# Patient Record
Sex: MALE | Race: WHITE | URBAN - METROPOLITAN AREA
[De-identification: names, ages, dates, MRNs, and addresses within clinical notes are randomized per-mention and may not be internally consistent; named-entity substitution may affect disease eponyms.]

---

## 2022-03-24 ENCOUNTER — HOSPITAL ENCOUNTER (INPATIENT)
Age: 57
LOS: 9 days | Discharge: HOME HEALTH | DRG: 232 | End: 2022-04-02
Attending: EMERGENCY MEDICINE | Admitting: INTERNAL MEDICINE
Payer: COMMERCIAL

## 2022-03-24 ENCOUNTER — APPOINTMENT (OUTPATIENT)
Dept: GENERAL RADIOLOGY | Age: 57
DRG: 232 | End: 2022-03-24
Payer: COMMERCIAL

## 2022-03-24 ENCOUNTER — APPOINTMENT (OUTPATIENT)
Dept: VASCULAR LAB | Age: 57
DRG: 232 | End: 2022-03-24
Payer: COMMERCIAL

## 2022-03-24 ENCOUNTER — APPOINTMENT (OUTPATIENT)
Dept: CARDIAC CATH/INVASIVE PROCEDURES | Age: 57
DRG: 232 | End: 2022-03-24
Payer: COMMERCIAL

## 2022-03-24 DIAGNOSIS — I21.3 ST ELEVATION MYOCARDIAL INFARCTION (STEMI), UNSPECIFIED ARTERY (HCC): Primary | ICD-10-CM

## 2022-03-24 DIAGNOSIS — G89.18 ACUTE POST-OPERATIVE PAIN: ICD-10-CM

## 2022-03-24 PROBLEM — I25.10 CAD IN NATIVE ARTERY: Status: ACTIVE | Noted: 2022-03-24

## 2022-03-24 LAB
A/G RATIO: 1.5 (ref 1.1–2.2)
ABO/RH: NORMAL
ALBUMIN SERPL-MCNC: 4.1 G/DL (ref 3.4–5)
ALP BLD-CCNC: 121 U/L (ref 40–129)
ALT SERPL-CCNC: 19 U/L (ref 10–40)
ANION GAP SERPL CALCULATED.3IONS-SCNC: 10 MMOL/L (ref 3–16)
ANTIBODY SCREEN: NORMAL
AST SERPL-CCNC: 20 U/L (ref 15–37)
BASOPHILS ABSOLUTE: 0.1 K/UL (ref 0–0.2)
BASOPHILS RELATIVE PERCENT: 0.5 %
BILIRUB SERPL-MCNC: 0.7 MG/DL (ref 0–1)
BILIRUBIN URINE: NEGATIVE
BLOOD, URINE: ABNORMAL
BUN BLDV-MCNC: 16 MG/DL (ref 7–20)
CALCIUM SERPL-MCNC: 8.4 MG/DL (ref 8.3–10.6)
CHLORIDE BLD-SCNC: 100 MMOL/L (ref 99–110)
CLARITY: CLEAR
CO2: 22 MMOL/L (ref 21–32)
COLOR: YELLOW
CREAT SERPL-MCNC: 0.8 MG/DL (ref 0.9–1.3)
EKG ATRIAL RATE: 72 BPM
EKG DIAGNOSIS: NORMAL
EKG P AXIS: 21 DEGREES
EKG P-R INTERVAL: 116 MS
EKG Q-T INTERVAL: 374 MS
EKG QRS DURATION: 90 MS
EKG QTC CALCULATION (BAZETT): 409 MS
EKG R AXIS: 75 DEGREES
EKG T AXIS: 92 DEGREES
EKG VENTRICULAR RATE: 72 BPM
EOSINOPHILS ABSOLUTE: 0.1 K/UL (ref 0–0.6)
EOSINOPHILS RELATIVE PERCENT: 1.2 %
GFR AFRICAN AMERICAN: >60
GFR NON-AFRICAN AMERICAN: >60
GLUCOSE BLD-MCNC: 125 MG/DL (ref 70–99)
GLUCOSE URINE: NEGATIVE MG/DL
HCT VFR BLD CALC: 36.3 % (ref 40.5–52.5)
HCT VFR BLD CALC: 38.8 % (ref 40.5–52.5)
HEMOGLOBIN: 12.2 G/DL (ref 13.5–17.5)
HEMOGLOBIN: 13 G/DL (ref 13.5–17.5)
KETONES, URINE: NEGATIVE MG/DL
LEUKOCYTE ESTERASE, URINE: NEGATIVE
LV EF: 35 %
LV EF: 43 %
LVEF MODALITY: NORMAL
LVEF MODALITY: NORMAL
LYMPHOCYTES ABSOLUTE: 1.4 K/UL (ref 1–5.1)
LYMPHOCYTES RELATIVE PERCENT: 11.7 %
MCH RBC QN AUTO: 31.4 PG (ref 26–34)
MCH RBC QN AUTO: 31.6 PG (ref 26–34)
MCHC RBC AUTO-ENTMCNC: 33.6 G/DL (ref 31–36)
MCHC RBC AUTO-ENTMCNC: 33.7 G/DL (ref 31–36)
MCV RBC AUTO: 93.5 FL (ref 80–100)
MCV RBC AUTO: 93.8 FL (ref 80–100)
MICROSCOPIC EXAMINATION: YES
MONOCYTES ABSOLUTE: 0.8 K/UL (ref 0–1.3)
MONOCYTES RELATIVE PERCENT: 6.9 %
NEUTROPHILS ABSOLUTE: 9.6 K/UL (ref 1.7–7.7)
NEUTROPHILS RELATIVE PERCENT: 79.7 %
NITRITE, URINE: NEGATIVE
PDW BLD-RTO: 12.8 % (ref 12.4–15.4)
PDW BLD-RTO: 13.2 % (ref 12.4–15.4)
PH UA: 7 (ref 5–8)
PLATELET # BLD: 213 K/UL (ref 135–450)
PLATELET # BLD: 236 K/UL (ref 135–450)
PMV BLD AUTO: 8.3 FL (ref 5–10.5)
PMV BLD AUTO: 8.9 FL (ref 5–10.5)
POC ACT LR: 278 SEC
POC ACT LR: 285 SEC
POC ACT LR: 320 SEC
POTASSIUM SERPL-SCNC: 3.5 MMOL/L (ref 3.5–5.1)
PROTEIN UA: NEGATIVE MG/DL
RBC # BLD: 3.87 M/UL (ref 4.2–5.9)
RBC # BLD: 4.15 M/UL (ref 4.2–5.9)
RBC UA: NORMAL /HPF (ref 0–4)
SODIUM BLD-SCNC: 132 MMOL/L (ref 136–145)
SPECIFIC GRAVITY UA: 1.01 (ref 1–1.03)
SPECIMEN STATUS: NORMAL
TOTAL PROTEIN: 6.8 G/DL (ref 6.4–8.2)
TROPONIN: 6.01 NG/ML
TROPONIN: <0.01 NG/ML
URINE REFLEX TO CULTURE: ABNORMAL
URINE TYPE: ABNORMAL
UROBILINOGEN, URINE: 1 E.U./DL
WBC # BLD: 11 K/UL (ref 4–11)
WBC # BLD: 12.1 K/UL (ref 4–11)
WBC UA: NORMAL /HPF (ref 0–5)

## 2022-03-24 PROCEDURE — B2151ZZ FLUOROSCOPY OF LEFT HEART USING LOW OSMOLAR CONTRAST: ICD-10-PCS | Performed by: INTERNAL MEDICINE

## 2022-03-24 PROCEDURE — 92973 PRQ TRLUML C MCHN ASP THRMBC: CPT | Performed by: INTERNAL MEDICINE

## 2022-03-24 PROCEDURE — 93010 ELECTROCARDIOGRAM REPORT: CPT | Performed by: INTERNAL MEDICINE

## 2022-03-24 PROCEDURE — C1887 CATHETER, GUIDING: HCPCS

## 2022-03-24 PROCEDURE — B2111ZZ FLUOROSCOPY OF MULTIPLE CORONARY ARTERIES USING LOW OSMOLAR CONTRAST: ICD-10-PCS | Performed by: INTERNAL MEDICINE

## 2022-03-24 PROCEDURE — 86850 RBC ANTIBODY SCREEN: CPT

## 2022-03-24 PROCEDURE — 93458 L HRT ARTERY/VENTRICLE ANGIO: CPT | Performed by: INTERNAL MEDICINE

## 2022-03-24 PROCEDURE — 99152 MOD SED SAME PHYS/QHP 5/>YRS: CPT | Performed by: INTERNAL MEDICINE

## 2022-03-24 PROCEDURE — 6360000002 HC RX W HCPCS

## 2022-03-24 PROCEDURE — 2500000003 HC RX 250 WO HCPCS: Performed by: NURSE PRACTITIONER

## 2022-03-24 PROCEDURE — 2580000003 HC RX 258

## 2022-03-24 PROCEDURE — 96374 THER/PROPH/DIAG INJ IV PUSH: CPT

## 2022-03-24 PROCEDURE — 84484 ASSAY OF TROPONIN QUANT: CPT

## 2022-03-24 PROCEDURE — C1725 CATH, TRANSLUMIN NON-LASER: HCPCS

## 2022-03-24 PROCEDURE — 93005 ELECTROCARDIOGRAM TRACING: CPT | Performed by: EMERGENCY MEDICINE

## 2022-03-24 PROCEDURE — 2709999900 HC NON-CHARGEABLE SUPPLY

## 2022-03-24 PROCEDURE — 93308 TTE F-UP OR LMTD: CPT

## 2022-03-24 PROCEDURE — C8929 TTE W OR WO FOL WCON,DOPPLER: HCPCS

## 2022-03-24 PROCEDURE — 85025 COMPLETE CBC W/AUTO DIFF WBC: CPT

## 2022-03-24 PROCEDURE — 93458 L HRT ARTERY/VENTRICLE ANGIO: CPT

## 2022-03-24 PROCEDURE — 93971 EXTREMITY STUDY: CPT

## 2022-03-24 PROCEDURE — 2580000003 HC RX 258: Performed by: NURSE PRACTITIONER

## 2022-03-24 PROCEDURE — 86901 BLOOD TYPING SEROLOGIC RH(D): CPT

## 2022-03-24 PROCEDURE — 6360000002 HC RX W HCPCS: Performed by: INTERNAL MEDICINE

## 2022-03-24 PROCEDURE — 6370000000 HC RX 637 (ALT 250 FOR IP): Performed by: INTERNAL MEDICINE

## 2022-03-24 PROCEDURE — C9606 PERC D-E COR REVASC W AMI S: HCPCS

## 2022-03-24 PROCEDURE — 93005 ELECTROCARDIOGRAM TRACING: CPT | Performed by: INTERNAL MEDICINE

## 2022-03-24 PROCEDURE — 36415 COLL VENOUS BLD VENIPUNCTURE: CPT

## 2022-03-24 PROCEDURE — 93880 EXTRACRANIAL BILAT STUDY: CPT

## 2022-03-24 PROCEDURE — 86900 BLOOD TYPING SEROLOGIC ABO: CPT

## 2022-03-24 PROCEDURE — 92973 PRQ TRLUML C MCHN ASP THRMBC: CPT

## 2022-03-24 PROCEDURE — P9047 ALBUMIN (HUMAN), 25%, 50ML: HCPCS

## 2022-03-24 PROCEDURE — 02C03ZZ EXTIRPATION OF MATTER FROM CORONARY ARTERY, ONE ARTERY, PERCUTANEOUS APPROACH: ICD-10-PCS | Performed by: INTERNAL MEDICINE

## 2022-03-24 PROCEDURE — 2000000000 HC ICU R&B

## 2022-03-24 PROCEDURE — 85027 COMPLETE CBC AUTOMATED: CPT

## 2022-03-24 PROCEDURE — C1753 CATH, INTRAVAS ULTRASOUND: HCPCS

## 2022-03-24 PROCEDURE — 027034Z DILATION OF CORONARY ARTERY, ONE ARTERY WITH DRUG-ELUTING INTRALUMINAL DEVICE, PERCUTANEOUS APPROACH: ICD-10-PCS | Performed by: INTERNAL MEDICINE

## 2022-03-24 PROCEDURE — 99283 EMERGENCY DEPT VISIT LOW MDM: CPT

## 2022-03-24 PROCEDURE — 81001 URINALYSIS AUTO W/SCOPE: CPT

## 2022-03-24 PROCEDURE — 2580000003 HC RX 258: Performed by: INTERNAL MEDICINE

## 2022-03-24 PROCEDURE — 6370000000 HC RX 637 (ALT 250 FOR IP): Performed by: EMERGENCY MEDICINE

## 2022-03-24 PROCEDURE — C1769 GUIDE WIRE: HCPCS

## 2022-03-24 PROCEDURE — 92978 ENDOLUMINL IVUS OCT C 1ST: CPT

## 2022-03-24 PROCEDURE — 85347 COAGULATION TIME ACTIVATED: CPT

## 2022-03-24 PROCEDURE — 4A023N7 MEASUREMENT OF CARDIAC SAMPLING AND PRESSURE, LEFT HEART, PERCUTANEOUS APPROACH: ICD-10-PCS | Performed by: INTERNAL MEDICINE

## 2022-03-24 PROCEDURE — 99291 CRITICAL CARE FIRST HOUR: CPT | Performed by: INTERNAL MEDICINE

## 2022-03-24 PROCEDURE — C1874 STENT, COATED/COV W/DEL SYS: HCPCS

## 2022-03-24 PROCEDURE — B240ZZ3 ULTRASONOGRAPHY OF SINGLE CORONARY ARTERY, INTRAVASCULAR: ICD-10-PCS | Performed by: STUDENT IN AN ORGANIZED HEALTH CARE EDUCATION/TRAINING PROGRAM

## 2022-03-24 PROCEDURE — 80053 COMPREHEN METABOLIC PANEL: CPT

## 2022-03-24 PROCEDURE — 92941 PRQ TRLML REVSC TOT OCCL AMI: CPT | Performed by: INTERNAL MEDICINE

## 2022-03-24 PROCEDURE — 92978 ENDOLUMINL IVUS OCT C 1ST: CPT | Performed by: INTERNAL MEDICINE

## 2022-03-24 PROCEDURE — 71045 X-RAY EXAM CHEST 1 VIEW: CPT

## 2022-03-24 PROCEDURE — C1894 INTRO/SHEATH, NON-LASER: HCPCS

## 2022-03-24 PROCEDURE — 99223 1ST HOSP IP/OBS HIGH 75: CPT | Performed by: NURSE PRACTITIONER

## 2022-03-24 PROCEDURE — 94010 BREATHING CAPACITY TEST: CPT

## 2022-03-24 PROCEDURE — 6360000002 HC RX W HCPCS: Performed by: EMERGENCY MEDICINE

## 2022-03-24 PROCEDURE — 2500000003 HC RX 250 WO HCPCS

## 2022-03-24 PROCEDURE — 2720000010 HC SURG SUPPLY STERILE

## 2022-03-24 PROCEDURE — A4216 STERILE WATER/SALINE, 10 ML: HCPCS

## 2022-03-24 RX ORDER — ATORVASTATIN CALCIUM 80 MG/1
80 TABLET, FILM COATED ORAL NIGHTLY
Status: DISCONTINUED | OUTPATIENT
Start: 2022-03-24 | End: 2022-03-29

## 2022-03-24 RX ORDER — MIDAZOLAM HYDROCHLORIDE 5 MG/ML
INJECTION INTRAMUSCULAR; INTRAVENOUS
Status: COMPLETED | OUTPATIENT
Start: 2022-03-24 | End: 2022-03-24

## 2022-03-24 RX ORDER — FENTANYL CITRATE 50 UG/ML
INJECTION, SOLUTION INTRAMUSCULAR; INTRAVENOUS
Status: COMPLETED | OUTPATIENT
Start: 2022-03-24 | End: 2022-03-24

## 2022-03-24 RX ORDER — ACETAMINOPHEN 325 MG/1
650 TABLET ORAL EVERY 6 HOURS PRN
Status: DISCONTINUED | OUTPATIENT
Start: 2022-03-24 | End: 2022-04-02 | Stop reason: HOSPADM

## 2022-03-24 RX ORDER — HEPARIN SODIUM 5000 [USP'U]/ML
5000 INJECTION, SOLUTION INTRAVENOUS; SUBCUTANEOUS ONCE
Status: DISCONTINUED | OUTPATIENT
Start: 2022-03-24 | End: 2022-03-24

## 2022-03-24 RX ORDER — SODIUM CHLORIDE 0.9 % (FLUSH) 0.9 %
5-40 SYRINGE (ML) INJECTION EVERY 12 HOURS SCHEDULED
Status: DISCONTINUED | OUTPATIENT
Start: 2022-03-24 | End: 2022-03-24

## 2022-03-24 RX ORDER — SODIUM CHLORIDE 0.9 % (FLUSH) 0.9 %
5-40 SYRINGE (ML) INJECTION EVERY 12 HOURS SCHEDULED
Status: DISCONTINUED | OUTPATIENT
Start: 2022-03-24 | End: 2022-04-02 | Stop reason: HOSPADM

## 2022-03-24 RX ORDER — SODIUM CHLORIDE 0.9 % (FLUSH) 0.9 %
5-40 SYRINGE (ML) INJECTION PRN
Status: DISCONTINUED | OUTPATIENT
Start: 2022-03-24 | End: 2022-03-24

## 2022-03-24 RX ORDER — ASPIRIN 81 MG/1
81 TABLET, CHEWABLE ORAL DAILY
Status: DISCONTINUED | OUTPATIENT
Start: 2022-03-25 | End: 2022-03-24

## 2022-03-24 RX ORDER — M-VIT,TX,IRON,MINS/CALC/FOLIC 27MG-0.4MG
1 TABLET ORAL DAILY
Status: DISCONTINUED | OUTPATIENT
Start: 2022-03-25 | End: 2022-04-02 | Stop reason: HOSPADM

## 2022-03-24 RX ORDER — ATROPINE SULFATE 0.1 MG/ML
INJECTION INTRAVENOUS
Status: COMPLETED | OUTPATIENT
Start: 2022-03-24 | End: 2022-03-24

## 2022-03-24 RX ORDER — MORPHINE SULFATE 4 MG/ML
4 INJECTION, SOLUTION INTRAMUSCULAR; INTRAVENOUS EVERY 4 HOURS PRN
Status: DISCONTINUED | OUTPATIENT
Start: 2022-03-24 | End: 2022-03-29

## 2022-03-24 RX ORDER — HEPARIN SODIUM 1000 [USP'U]/ML
5000 INJECTION, SOLUTION INTRAVENOUS; SUBCUTANEOUS ONCE
Status: COMPLETED | OUTPATIENT
Start: 2022-03-24 | End: 2022-03-24

## 2022-03-24 RX ORDER — SODIUM CHLORIDE 9 MG/ML
25 INJECTION, SOLUTION INTRAVENOUS PRN
Status: DISCONTINUED | OUTPATIENT
Start: 2022-03-24 | End: 2022-03-24 | Stop reason: SDUPTHER

## 2022-03-24 RX ORDER — LISINOPRIL 5 MG/1
5 TABLET ORAL DAILY
Status: DISCONTINUED | OUTPATIENT
Start: 2022-03-24 | End: 2022-03-29

## 2022-03-24 RX ORDER — POLYETHYLENE GLYCOL 3350 17 G/17G
17 POWDER, FOR SOLUTION ORAL DAILY PRN
Status: DISCONTINUED | OUTPATIENT
Start: 2022-03-24 | End: 2022-04-02 | Stop reason: HOSPADM

## 2022-03-24 RX ORDER — SODIUM CHLORIDE 0.9 % (FLUSH) 0.9 %
5-40 SYRINGE (ML) INJECTION PRN
Status: DISCONTINUED | OUTPATIENT
Start: 2022-03-24 | End: 2022-04-02 | Stop reason: HOSPADM

## 2022-03-24 RX ORDER — MORPHINE SULFATE 2 MG/ML
2 INJECTION, SOLUTION INTRAMUSCULAR; INTRAVENOUS EVERY 4 HOURS PRN
Status: DISCONTINUED | OUTPATIENT
Start: 2022-03-24 | End: 2022-03-29

## 2022-03-24 RX ORDER — SODIUM CHLORIDE 9 MG/ML
25 INJECTION, SOLUTION INTRAVENOUS PRN
Status: DISCONTINUED | OUTPATIENT
Start: 2022-03-24 | End: 2022-03-29

## 2022-03-24 RX ORDER — ONDANSETRON 2 MG/ML
4 INJECTION INTRAMUSCULAR; INTRAVENOUS EVERY 6 HOURS PRN
Status: DISCONTINUED | OUTPATIENT
Start: 2022-03-24 | End: 2022-03-29

## 2022-03-24 RX ORDER — 0.9 % SODIUM CHLORIDE 0.9 %
500 INTRAVENOUS SOLUTION INTRAVENOUS ONCE
Status: COMPLETED | OUTPATIENT
Start: 2022-03-24 | End: 2022-03-24

## 2022-03-24 RX ORDER — ASPIRIN 81 MG/1
81 TABLET, CHEWABLE ORAL DAILY
Status: DISCONTINUED | OUTPATIENT
Start: 2022-03-25 | End: 2022-03-29

## 2022-03-24 RX ORDER — CARVEDILOL 3.12 MG/1
3.12 TABLET ORAL 2 TIMES DAILY WITH MEALS
Status: DISCONTINUED | OUTPATIENT
Start: 2022-03-24 | End: 2022-03-29

## 2022-03-24 RX ORDER — ACETAMINOPHEN 325 MG/1
650 TABLET ORAL EVERY 4 HOURS PRN
Status: DISCONTINUED | OUTPATIENT
Start: 2022-03-24 | End: 2022-03-24

## 2022-03-24 RX ORDER — EPTIFIBATIDE 0.75 MG/ML
2 INJECTION, SOLUTION INTRAVENOUS CONTINUOUS
Status: DISCONTINUED | OUTPATIENT
Start: 2022-03-24 | End: 2022-03-28

## 2022-03-24 RX ORDER — ATORVASTATIN CALCIUM 80 MG/1
80 TABLET, FILM COATED ORAL NIGHTLY
Status: DISCONTINUED | OUTPATIENT
Start: 2022-03-24 | End: 2022-03-24

## 2022-03-24 RX ORDER — ACETAMINOPHEN 650 MG/1
650 SUPPOSITORY RECTAL EVERY 6 HOURS PRN
Status: DISCONTINUED | OUTPATIENT
Start: 2022-03-24 | End: 2022-04-02 | Stop reason: HOSPADM

## 2022-03-24 RX ORDER — ONDANSETRON 4 MG/1
4 TABLET, ORALLY DISINTEGRATING ORAL EVERY 8 HOURS PRN
Status: DISCONTINUED | OUTPATIENT
Start: 2022-03-24 | End: 2022-03-29

## 2022-03-24 RX ORDER — METOPROLOL TARTRATE 5 MG/5ML
5 INJECTION INTRAVENOUS ONCE
Status: COMPLETED | OUTPATIENT
Start: 2022-03-24 | End: 2022-03-24

## 2022-03-24 RX ADMIN — ATORVASTATIN CALCIUM 80 MG: 80 TABLET, FILM COATED ORAL at 20:21

## 2022-03-24 RX ADMIN — METOPROLOL TARTRATE 5 MG: 1 INJECTION, SOLUTION INTRAVENOUS at 23:08

## 2022-03-24 RX ADMIN — MIDAZOLAM HYDROCHLORIDE 2 MG: 5 INJECTION INTRAMUSCULAR; INTRAVENOUS at 12:33

## 2022-03-24 RX ADMIN — FENTANYL CITRATE 50 MCG: 50 INJECTION, SOLUTION INTRAMUSCULAR; INTRAVENOUS at 12:33

## 2022-03-24 RX ADMIN — SODIUM CHLORIDE 500 ML: 9 INJECTION, SOLUTION INTRAVENOUS at 21:58

## 2022-03-24 RX ADMIN — ATROPINE SULFATE 1 MG: 0.1 INJECTION INTRAVENOUS at 12:44

## 2022-03-24 RX ADMIN — MIDAZOLAM HYDROCHLORIDE 2 MG: 5 INJECTION INTRAMUSCULAR; INTRAVENOUS at 12:44

## 2022-03-24 RX ADMIN — CARVEDILOL 3.12 MG: 3.12 TABLET, FILM COATED ORAL at 19:09

## 2022-03-24 RX ADMIN — LISINOPRIL 5 MG: 5 TABLET ORAL at 15:03

## 2022-03-24 RX ADMIN — FENTANYL CITRATE 50 MCG: 50 INJECTION, SOLUTION INTRAMUSCULAR; INTRAVENOUS at 12:25

## 2022-03-24 RX ADMIN — FENTANYL CITRATE 50 MCG: 50 INJECTION, SOLUTION INTRAMUSCULAR; INTRAVENOUS at 12:44

## 2022-03-24 RX ADMIN — EPTIFIBATIDE 2 MCG/KG/MIN: 0.75 INJECTION INTRAVENOUS at 12:48

## 2022-03-24 RX ADMIN — MIDAZOLAM HYDROCHLORIDE 2 MG: 5 INJECTION INTRAMUSCULAR; INTRAVENOUS at 12:26

## 2022-03-24 RX ADMIN — EPTIFIBATIDE 2 MCG/KG/MIN: 0.75 INJECTION INTRAVENOUS at 15:13

## 2022-03-24 RX ADMIN — SODIUM CHLORIDE, PRESERVATIVE FREE 10 ML: 5 INJECTION INTRAVENOUS at 20:22

## 2022-03-24 RX ADMIN — MUPIROCIN: 20 OINTMENT TOPICAL at 20:22

## 2022-03-24 RX ADMIN — EPTIFIBATIDE 2 MCG/KG/MIN: 0.75 INJECTION INTRAVENOUS at 22:35

## 2022-03-24 RX ADMIN — HEPARIN SODIUM 5000 UNITS: 1000 INJECTION INTRAVENOUS; SUBCUTANEOUS at 12:05

## 2022-03-24 RX ADMIN — TICAGRELOR 180 MG: 90 TABLET ORAL at 12:06

## 2022-03-24 RX ADMIN — FENTANYL CITRATE 50 MCG: 50 INJECTION, SOLUTION INTRAMUSCULAR; INTRAVENOUS at 12:38

## 2022-03-24 ASSESSMENT — ENCOUNTER SYMPTOMS
EYE REDNESS: 0
EYE DISCHARGE: 0
ABDOMINAL DISTENTION: 0
ABDOMINAL PAIN: 0
SHORTNESS OF BREATH: 0
SINUS PRESSURE: 0
EYE PAIN: 0
NAUSEA: 0
FACIAL SWELLING: 0
CHEST TIGHTNESS: 0
ANAL BLEEDING: 0
STRIDOR: 0
BLOOD IN STOOL: 0
TROUBLE SWALLOWING: 0
VOICE CHANGE: 0
VOMITING: 0
BACK PAIN: 0
EYE ITCHING: 0
DIARRHEA: 0
PHOTOPHOBIA: 0
WHEEZING: 0
CONSTIPATION: 0
RHINORRHEA: 0
SORE THROAT: 0
COUGH: 0

## 2022-03-24 ASSESSMENT — PAIN DESCRIPTION - PAIN TYPE: TYPE: ACUTE PAIN

## 2022-03-24 ASSESSMENT — PAIN - FUNCTIONAL ASSESSMENT: PAIN_FUNCTIONAL_ASSESSMENT: 0-10

## 2022-03-24 ASSESSMENT — PAIN DESCRIPTION - LOCATION: LOCATION: CHEST

## 2022-03-24 ASSESSMENT — PAIN SCALES - GENERAL
PAINLEVEL_OUTOF10: 0
PAINLEVEL_OUTOF10: 0

## 2022-03-24 NOTE — PROCEDURES
CARDIAC CATHETERIZATION REPORT     Procedure Date:  3/24/2022  Patient Name: Emiliano Pereira  MRN: [id number] : 1965      INDICATION     Acute inferior STEMI    PROCEDURES PERFORMED     Left heart catheterization  LVgram  Coronary angiogam  Coronary cath  Monitoring of moderate conscious sedation    Mechanical thrombectomy of RCA  IVUS of RCA  PCI of RCA with single drug-eluting stent      PROCEDURE DESCRIPTION     This was felt to be an emergency procedure. Patient was prepped draped in the usual sterile fashion. Local anaesthetic was applied over puncture site. Using a back wall technique, a 6 Lithuanian Terumo sheath was inserted into right radial artery. Verapamil, nitroglycerin, nicardipine were administered through the sheath. Heparin was administered. Diagnostic 5 Macedonian pigtail, Ultram catheters were used for diagnostic angiograms. At the conclusion of the procedure, a TR band was placed over the puncture site and hemostasis was obtained. There were no immediate complications. I supervised sedation from 12:17 PM to 1:08 PM with versed 6 mg/fentanyl 200 mcg during the procedure. An independent trained observer pushed meds at my direction. We monitored the patient's level of consciousness and vital signs/physiologic status throughout the procedure duration (see times listed previously). 130 cc contrast was utilized. <20cc EBL. FINDINGS     LVGRAM    LVEDP  17   GRADIENT ACROSS AORTIC VALVE  none   LV FUNCTION EF 35%   WALL MOTION  inferior hypokinesis   MITRAL REGURGITATION  mild       CORONARY ARTERIES    LM  Less than 10% proximal stenosis, mid 20-30% stenosis. There is distal 70% stenosis. LAD  Proximal 20% stenosis, mid 60 to 70% stenosis, distal less than 10% stenosis. D1 has 20% tirupekp-blc-zjxrwv stenosis. LCX  Less than 10% bvspifps-clq-dszfex stenosis.        RI  Difficult to fully visualize on study, is a small to medium size vessel, there does not appear to be severe obstruction, there does appear to be less than 10% bymybyvf-nue-tdgius stenosis. RCA Dominant, large vessel, proximal-mid 100% occlusion, distal vessel was ultimately visualized on completion angiography is less than 10% stenosis. There is thrombus present in the mid vessel. PERCUTANEOUS INTERVENTION DESCRIPTION     Heparin was used for any coagulation, patient been preloaded with Brilinta, Integrilin was given during the procedure. Initially a 6 Clive Everwise JR 5 guiding catheter used to intubate the RCA. A Nugg-it wire was used to cross the lesion. Mechanical thrombectomy was performed with the penumbra device. Flow was restored to this. IVUS was performed which showed thrombus/dissected plaque in the mid vessel and this was then treated with stenting with Medtronic resolute Ezel 4 x 38 mm drug-eluting stent. Stent was postdilated with 4.5 and 5 mm noncompliant balloons. Follow-up IVUS showed good stent apposition/expansion. There was no residual dissection or thrombus noted. CONCLUSIONS:     Severe left main disease  Successful PCI of RCA with single drug-eluting stent    Continue Integrilin indefinitely, will consult with CT surgery, case discussed with Dr. Ian Dillon in Cath Lab, plan for CABG at some point in the future. Fall

## 2022-03-24 NOTE — PLAN OF CARE
Plan of care initiated. Patient is alert and oriented. Denies any pain or SOB. Afebrile. Right radial site WDL. Right femoral site sheath removed WDL, patient tolerated well. NSR on monitor. To remain on Integrilin gtt until surgery on Monday. Free from any injury. Patient and patient's family updated on plan of care. Will continue plan of care.      Problem: Falls - Risk of:  Goal: Will remain free from falls  Description: Will remain free from falls  Outcome: Ongoing  Goal: Absence of physical injury  Description: Absence of physical injury  Outcome: Ongoing     Problem: Nutritional:  Goal: Ability to tolerate tube feedings without aspirating will improve  Description: Ability to tolerate tube feedings without aspirating will improve  Outcome: Ongoing  Goal: Consumption of food in small portions  Description: Consumption of food in small portions  Outcome: Ongoing  Goal: Consumption of liquid of appropriate consistency  Description: Consumption of liquid of appropriate consistency  Outcome: Ongoing     Problem: Respiratory:  Goal: Ability to maintain a clear airway will improve  Description: Ability to maintain a clear airway will improve  Outcome: Ongoing  Goal: Will remain free from infection  Description: Will remain free from infection  Outcome: Ongoing  Goal: Absence of aspiration  Description: Absence of aspiration  Outcome: Ongoing     Problem: Safety:  Goal: Ability to chew and swallow food without choking will improve  Description: Ability to chew and swallow food without choking will improve  Outcome: Ongoing  Goal: Ability to demonstrate good, daily oral hygiene techniques will improve  Description: Ability to demonstrate good, daily oral hygiene techniques will improve  Outcome: Ongoing  Goal: Maintenance of upright position during and after feeding  Description: Maintenance of upright position during and after feeding  Outcome: Ongoing     Problem: Discharge Planning:  Goal: Discharged to appropriate level of care  Description: Discharged to appropriate level of care  Outcome: Ongoing     Problem: Pain - Acute:  Description: Pain management should include both nonpharmacologic and pharmacologic interventions.   Goal: Pain level will decrease  Description: Pain level will decrease  Outcome: Ongoing     Problem: Fluid Volume - Imbalance:  Goal: Will show no signs and symptoms of excessive bleeding  Description: Will show no signs and symptoms of excessive bleeding  Outcome: Ongoing  Goal: Absence of imbalanced fluid volume signs and symptoms  Description: Absence of imbalanced fluid volume signs and symptoms  Outcome: Ongoing     Problem: Anxiety:  Goal: Level of anxiety will decrease  Description: Level of anxiety will decrease  Outcome: Ongoing     Problem: Cardiac Output - Decreased:  Goal: Cardiac output within specified parameters  Description: Cardiac output within specified parameters  Outcome: Ongoing     Problem: Tissue Perfusion - Cardiopulmonary, Altered:  Goal: Circulatory function within specified parameters  Description: Circulatory function within specified parameters  Outcome: Ongoing  Goal: Absence of angina  Description: Absence of angina  Outcome: Ongoing  Goal: Hemodynamic stability will improve  Description: Hemodynamic stability will improve  Outcome: Ongoing     Problem: Tissue Perfusion - Peripheral, Altered:  Goal: Absence of hematoma at arterial access site  Description: Absence of hematoma at arterial access site  Outcome: Ongoing  Goal: Circulatory function of lower extremities is within specified parameters  Description: Circulatory function of lower extremities is within specified parameters  Outcome: Ongoing     Problem: Tobacco Use:  Goal: Will participate in inpatient tobacco-use cessation counseling  Description: Will participate in inpatient tobacco-use cessation counseling  Outcome: Ongoing

## 2022-03-24 NOTE — PROGRESS NOTES
Patient was seen and examined chart was reviewed cath was reviewed and discussed with cardiology  Plan for coronary artery bypass x2 Monday  Currently patient received a loading dose of Brilinta stable on Angiomax for fresh stent in the right coronary  Plan  LIMA to LAD reverse saphenous vein to OM second case Monday  Echo  Carotid duplex  Vein mapping

## 2022-03-24 NOTE — PROGRESS NOTES
Patient arrived from cath lab to  ICU. Attached to bedside monitor. Oriented to room and call light. Right radial TR band in place with no complications at this time. Patient has venous sheath to right groin. Patient denies any chest pain of shortness of breath at this time. Right and left side rail up and bed in lowest position. Will continue to monitor patient.      Lux Pena, RN

## 2022-03-24 NOTE — PROGRESS NOTES
Venous sheath removed at 1836. Pressure held for 10 minutes No complications noted. Will continue to monitor.      Farhan Sood RN

## 2022-03-24 NOTE — ED NOTES
1200:  Pt arrives via [facility]. Pt alert and oriented at time of admission to ED  1200:  Cath lab RNs x2 at bedside prior to pt arrival.  1204:  Cardiologist to bedside to assess pt and to inform pt of plan of care. 1205:  Heparin administered per order. 1206:  Brilinta administered per physician order. 1208:  Pt's monitors transferred to Cath lab portable monitors. 1210:  Pt transferred to Cath lab via stretcher. Pt remains stable and alert at transfer. 1211:  Pt's wife to room and escorted to cardiology waiting room by [facility].        Danielle Vásquez, RN  03/24/22 1223

## 2022-03-24 NOTE — PROGRESS NOTES
4 Eyes Skin Assessment     The patient is being assess for   Cath Lab Post-Op    I agree that 2 RN's have performed a thorough Head to Toe Skin Assessment on the patient. ALL assessment sites listed below have been assessed. Areas assessed for pressure by both nurses:   [x]   Head, Face, and Ears   [x]   Shoulders, Back, and Chest, Abdomen  [x]   Arms, Elbows, and Hands   [x]   Coccyx, Sacrum, and Ischium  [x]   Legs, Feet, and Heels        Skin Assessed Under all Medical Devices by both nurses:  O2 device tubing and walker              All Mepilex Borders were peeled back and area peeked at by both nurses:  Yes  Please list where Mepilex Borders are located:  Sacrum             **SHARE this note so that the co-signing nurse is able to place an eSignature**    Co-signer eSignature: Electronically signed by Mat Jain RN on 3/24/22 at 6:41 PM EDT    Does the Patient have Skin Breakdown related to pressure?   No              Tripp Prevention initiated:  Yes   Wound Care Orders initiated:  NA      [facility] nurse consulted for Pressure Injury (Stage 3,4, Unstageable, DTI, NWPT, Complex wounds)and New or Established Ostomies:  NA      Primary Nurse eSignature: Electronically signed by Heidi Bryant RN on 3/24/22 at 7:28 PM EDT

## 2022-03-24 NOTE — H&P
Hospital Medicine History & Physical      PCP: No primary care provider on file. Date of Admission: 3/24/2022    Date of Service: Pt seen/examined on 3/24/2022 and Admitted to Inpatient with expected LOS greater than two midnights due to medical therapy. Chief Complaint:  Chest pain    History Of Present Illness:   56 y.o. male who presented to [facility] with chest pain. No significant PMHx. Works in [employer]. No known hx of CAD, DM2, HTN. He reports acute episode of chest pain/pressure, associated with some SOB. EMS called and he was found to have inferior STEMI. Presented to the ED. Cath lab activated. Found to have mid RCA thrombosis s/p PCI with DANIA; also noted to have 70% left main. CT Surgery consulted. Pain has improved. Past Medical History:    History reviewed. No pertinent past medical history. Past Surgical History:    History reviewed. No pertinent surgical history. Medications Prior to Admission:   Prior to Admission medications    Not on File       Allergies:   Patient has no known allergies. Social History:    TOBACCO:   reports that he has been smoking. He does not have any smokeless tobacco history on file. ETOH:   has no history on file for alcohol use. E-Cigarettes/Vaping Use     Questions Responses    E-Cigarette/Vaping Use     Start Date     Passive Exposure     Quit Date     Counseling Given     Comments             Family History:    Reviewed and negative in regards to presenting illness/complaint. REVIEW OF SYSTEMS:   Pertinent positives as noted in the HPI. All other systems reviewed with patient as able and negative. Physical Exam Performed:  /74   Pulse 88   Temp 97.6 °F (36.4 °C) (Oral)   Resp 8   Ht 5' 11\" (1.803 m)   Wt 197 lb 15.6 oz (89.8 kg)   SpO2 99%   BMI 27.61 kg/m²   General appearance:  No apparent distress, appears stated age and cooperative. HEENT:  Pupils equal, round, and reactive to light.  Conjunctivae/corneas clear.  Neck:  Supple, no jugular venous distention. Trachea midline with full range of motion. Respiratory:  Normal respiratory effort. Clear to auscultation, bilaterally without Rales/Wheezes/Rhonchi. Cardiovascular:  Regular rate and rhythm with normal S1/S2 without murmurs, rubs or gallops. Abdomen:  Soft, non-tender, non-distended with normal bowel sounds. Musculoskelatal:  No clubbing, cyanosis or edema bilaterally. Full range of motion without deformity. Neurologic:  Neurovascularly intact without any focal sensory/motor deficits. Cranial nerves: II-XII intact, grossly non-focal.  Psychiatric:  Alert and oriented, thought content appropriate, normal insight  Skin:  Skin color, texture, turgor normal.  No rashes or lesions. Capillary Refill:  Brisk,< 3 seconds   Peripheral Pulses:  +2 palpable, equal bilaterally     Labs:     Recent Labs     03/24/22  1210 03/24/22  1240   WBC 12.1* 11.0   HGB 13.0* 12.2*   HCT 38.8* 36.3*    213     Recent Labs     03/24/22  1240   *   K 3.5      CO2 22   BUN 16   CREATININE 0.8*   CALCIUM 8.4     Recent Labs     03/24/22  1240   AST 20   ALT 19   BILITOT 0.7   ALKPHOS 121     No results for input(s): INR in the last 72 hours. Recent Labs     03/24/22  1240   TROPONINI <0.01       Radiology:     CXR: I have reviewed the CXR with the following interpretation: Clear  EKG:  I have reviewed the EKG with the following interpretation: NSR, inferior STEMI    XR CHEST PORTABLE    Result Date: 3/24/2022  EXAMINATION: ONE XRAY VIEW OF THE CHEST 3/24/2022 12:16 pm COMPARISON: None. HISTORY: ORDERING SYSTEM PROVIDED HISTORY: chest pain TECHNOLOGIST PROVIDED HISTORY: Reason for exam:->chest pain Reason for Exam: STEMI FINDINGS: The cardiomediastinal silhouette is unremarkable. The lungs are clear. No infiltrate, pleural fluid or evidence of overt failure. No acute cardiopulmonary disease.        ASSESSMENT:    Principal Problem:    CAD in native artery  Active Problems:    STEMI (ST elevation myocardial infarction) ([facility] [address]  Resolved Problems:    * No resolved hospital problems. *      PLAN:    CAD/Inferior STEMI: s/p PCI of mid RCA. Also has 70% left main. CT Surgery consulted and planning for CABG on Monday. Pre-op eval underway. Continue medical management with Integrillin. Pain control as needed. DVT Prophylaxis: Lovenox  Diet: ADULT DIET;  Regular  Code Status: Full Code  PT/OT Eval Status: NA  Dispo - ICU     Lux Ferreira MD

## 2022-03-24 NOTE — CONSULTS
Department of Cardiovascular & Thoracic Surgery  History and Physical          DIAGNOSIS:  Severe multivessel CAD, inferior STEMI     CHIEF COMPLAINT:    Chief Complaint   Patient presents with    Chest Pain     Pain started at approximately 1000 this AM.  Pt has hx of smoking cigarettes daily. Pt reports no medical or surgical hx. Pt reports no allergies. 324 ASA administered by EMS prior to arrival.  IV in [facility] [place] placed by EMS prior to arrival.       History Obtained From:  patient, electronic medical record    HISTORY OF PRESENT ILLNESS:      The patient is a 56 y.o. male, current smoker, with no significant past medical history documented who presented to [facility] ED today with complaints of chest pain that started while he was outside [employer]. He states he initially developed shortness of breath shortly after edging. He then developed chest tightness. He denies nausea or dizziness. He did experience some diaphoresis. His mother has a history of CAD. EKG obtained by EMS showed STEMI. Heparin gtt initiated and brilinta given in ED. The patient was taken emergently for LHC which showed severe multivessel disease, including LM disease. We have been consulted for surgical revascularization. Past Medical History:    History reviewed. No pertinent past medical history. Past Surgical History:    History reviewed. No pertinent surgical history. Medications Prior to Admission:   No medications prior to admission. Allergies:  Patient has no known allergies. Social History:    TOBACCO:   reports that he has been smoking. He does not have any smokeless tobacco history on file. ETOH:   has no history on file for alcohol use. CAFFEINE ABUSE:  Drinks 3-4 cups of coffee/day   DRUGS:   has no history on file for drug use. LIFESTYLE: active    MARITAL STATUS:    OCCUPATION:  Owns/operates landscaping company      Family History:    History reviewed. No pertinent family history.     REVIEW OF SYSTEMS: Constitutional:  No night sweats, headaches, weight loss. Eyes:  No glaucoma, cataracts. ENMT:  No nosebleeds, deviated septum. Cardiac:  No arrhythmias. + chest pain. Vascular:  No claudication, varicosities. GI:  No PUD, heartburn. :  No kidney stones, frequent UTIs  Musculoskeletal:  No arthritis, gout. Respiratory:  + SOB. No emphysema, asthma. + tobacco use. Integumentary:  No dermatitis, itching, rash. Neurological:  No stroke, TIAs, seizures. Psychiatric:  No depression, anxiety. Endocrine: No diabetes, thyroid issues. Hematologic:  No bleeding, easy bruising. Immunologic:  No known cancer, steroid therapies. PHYSICAL EXAM:    VITALS:  /75   Pulse 65   Temp 98.3 °F (36.8 °C) (Oral)   Resp 17   Ht 5' 11\" (1.803 m)   Wt 197 lb 15.6 oz (89.8 kg)   SpO2 97%   BMI 27.61 kg/m²     Constitutional:   Well developed and nourished male. No acute distress. Overweight. Eyes:  lids and lashes normal, pupils equal, round and reactive to light, extra ocular muscles intact, sclera clear, conjunctiva normal    Head/ENT:   normal teeth, gums, & palate. Moist mucus membranes. No cyanosis or pallor. Neck:  supple, symmetrical, trachea midline, no lymphadenopathy, no jugular venous distension, no carotid bruits and MASSES:  no masses. Lungs:  no increased work of breathing, good air exchange, no retractions and clear to auscultation. No tactile fremitus. Cardiovascular:  regular rate and rhythm, S1, S2 normal, no murmur, click, rub or gallop. Apical impulse in 5th intercostal space. Pulses:  Right dorsalis pedis 2, Left dorsalis pedis 2, Right posterior tibial 2, Left Posterior tibial 2, Right radial 2, and Left radial 2. Abdomen:  normal bowel sounds, non-tender, unable to evaluate abdominal aorta due to body habitus. No hepatosplenomegaly or masses. Musculoskeletal:  Back is straight and non-tender, full ROM of upper and lower extremities.   No kyphosis or scoliosis. Extremities:  Warm, pink, no clubbing, cyanosis, petechiae, ischemia, or deformities. No peripheral edema. Skin: no rashes, no ecchymoses, no petechiae, no nodules, no jaundice, no purpura, no wounds    Neurological/Psychiatric: oriented, normal mood, grossly non-focal    DATA:  EKG:    3/24/22  Baseline artifact. Normal sinus rhythm. ST elevation consider inferior injury or acute infarct. ACUTE MI. Abnormal ECG. No previous ECGs available. Confirmed by Gary Vieira MD, [facility] ([id number]) on 3/24/2022 6:01:20 PM    3/24/22  Poor data quality, interpretation may be adversely affected. Normal sinus rhythm. Possible Inferior infarct , age undetermined. Abnormal ECG. When compared with ECG of 24-MAR-2022 12:03, ST no longer elevated in Inferior leads. ST no longer depressed in Anterolateral leads. T wave inversion now evident in Inferior leads.  T wave inversion no longer evident in Lateral leads    CBC:   Lab Results   Component Value Date    WBC 11.0 03/24/2022    RBC 3.87 03/24/2022    HGB 12.2 03/24/2022    HCT 36.3 03/24/2022    MCV 93.8 03/24/2022    MCH 31.6 03/24/2022    MCHC 33.7 03/24/2022    RDW 12.8 03/24/2022     03/24/2022    MPV 8.3 03/24/2022     CMP:    Lab Results   Component Value Date     03/24/2022    K 3.5 03/24/2022     03/24/2022    CO2 22 03/24/2022    BUN 16 03/24/2022    CREATININE 0.8 03/24/2022    GFRAA >60 03/24/2022    AGRATIO 1.5 03/24/2022    LABGLOM >60 03/24/2022    GLUCOSE 125 03/24/2022    PROT 6.8 03/24/2022    LABALBU 4.1 03/24/2022    CALCIUM 8.4 03/24/2022    BILITOT 0.7 03/24/2022    ALKPHOS 121 03/24/2022    AST 20 03/24/2022    ALT 19 03/24/2022     Last 3 Troponin:    Lab Results   Component Value Date    TROPONINI 6.01 03/24/2022    TROPONINI <0.01 03/24/2022     CXRAY:  3/24/22  FINDINGS:   The cardiomediastinal silhouette is unremarkable.  The lungs are clear.  No   infiltrate, pleural fluid or evidence of overt failure.           Impression No acute cardiopulmonary disease.         TTE: 3/24/22  Left Ventricle   Normal left ventricle size, wall thickness, and systolic function with an   estimated ejection fraction of 40-45%. EF by Vaz's method estimated at 53%. Normal left ventricle size, wall thickness, and systolic function with an   estimated ejection fraction of 40-45%. Mild hypokinesis of the inferior, inferoseptal wall segments. Normal left ventricular diastolic filling pressures. Definity® used for myocardial border enhancement. Mitral Valve   The mitral valve is normal in structure. Trace mitral regurgitation. Left Atrium   The left atrium is normal in size. Aortic Valve   The aortic valve is normal in structure and function. There is no evidence of aortic valve regurgitation. Aorta   The aortic root is normal in size. Right Ventricle   The right ventricle is normal in size and function. TAPSE is measured at 21 mm. S\" Prime Velocity is measured at 10.7 cm/s. Tricuspid Valve   The tricuspid valve is normal in structure   Trace tricuspid valve regurgitation. Systolic pulmonary artery pressure (sPAP) is normal and estimated at 23 mmHg   (right atrial pressure 3 mmHg)      Right Atrium   The right atrial size is normal.   Right atrial area is 14.9 cm2. Pulmonic Valve   The pulmonic valve is normal in structure and function. No evidence of pulmonic regurgitation. Pericardial Effusion   No pericardial effusion noted. Pleural Effusion   No pleural effusion. Miscellaneous   No obvious masses, thrombi or vegetations are noted. IVC is dilated (> 2.1 cm) and collapses > 50% with respiration consistent   with elevated right atrial pressure (8 mmHg).      M-Mode/2D Measurements (cm)      LV Diastolic Dimension: 2.43 cm LV Systolic Dimension: 9.93 cm   LV Septum Diastolic: 0.63 cm   LV PW Diastolic: 4.36 cm        AO Root Dimension: 3.1 cm                                   AV Cusp Separation: 1.4 cm                                   LA Dimension: 3.1 cm   LVOT: 1.8 cm                    LA Area: 21.6 cm2                                   LA volume/Index: 53 ml /26 ml/m2     Doppler Measurements      AV Peak Velocity: 110 cm/s     MV Peak E-Wave: 66.1 cm/s   AV Peak Gradient: 4.84 mmHg    MV Peak A-Wave: 64.2 cm/s   LVOT Peak Velocity: 68.2 cm/s  MV E/A Ratio: 1.03      TR Velocity:194 cm/s   TR Gradient:15.05 mmHg   Estimated RAP:8 mmHg   Estimated RVSP: 23 mmHg   E' Septal Velocity: 7.02 cm/s   E' Lateral Velocity: 11.2 cm/s   E/E' ratio: 7.7      Aortic Valve      Peak Velocity: 110 cm/s   Peak Gradient: 4.84 mmHg      Cusp Separation: 1.4 cm     Aorta      Aortic Root: 3.1 cm   Ascending Aorta: 3.1 cm   LVOT Diameter: 1.8 cm     CT Chest:  No prior study available in EMR for review. [facility]:  3/24/22  LVGRAM     LVEDP  17   GRADIENT ACROSS AORTIC VALVE  none   LV FUNCTION EF 35%   WALL MOTION  inferior hypokinesis   MITRAL REGURGITATION  mild         CORONARY ARTERIES     LM  Less than 10% proximal stenosis, mid 20-30% stenosis. There is distal 70% stenosis.       LAD  Proximal 20% stenosis, mid 60 to 70% stenosis, distal less than 10% stenosis.     D1 has 20% yuiuyucn-brr-rudvig stenosis.       LCX  Less than 10% feeucftt-few-ihnaye stenosis.       RI  Difficult to fully visualize on study, is a small to medium size vessel, there does not appear to be severe obstruction, there does appear to be less than 10% vylixwka-sru-flbvdz stenosis.       RCA Dominant, large vessel, proximal-mid 100% occlusion, distal vessel was ultimately visualized on completion angiography is less than 10% stenosis. There is thrombus present in the mid vessel.            PERCUTANEOUS INTERVENTION DESCRIPTION      Heparin was used for any coagulation, patient been preloaded with Brilinta, Integrilin was given during the procedure. Initially a 6 [employer] 5 guiding catheter used to intubate the RCA.   A Wizzgo wire was used to cross the lesion. Mechanical thrombectomy was performed with the penumbra device. Flow was restored to this. IVUS was performed which showed thrombus/dissected plaque in the mid vessel and this was then treated with stenting with Medtronic resolute Isidro 4 x 38 mm drug-eluting stent. Stent was postdilated with 4.5 and 5 mm noncompliant balloons. Follow-up IVUS showed good stent apposition/expansion. There was no residual dissection or thrombus noted.        [id number] Score for Atrial Fibrillation Stroke Risk   Risk   Factors  Component Value   C CHF No 0   H HTN No 0   A2 Age >= 76 No,  (56 y.o.) 0   D DM No 0   S2 Prior Stroke/TIA No 0   V Vascular Disease Yes 1   A Age 74-69 No,  (56 y.o.) 0   Sc Sex male 0    [id number]  Score  1   Score last updated 3/24/22 4:02 PM EDT    Click here for a link to the UpToDate guideline \"Atrial Fibrillation: Anticoagulation therapy to prevent embolization    Disclaimer: Risk Score calculation is dependent on accuracy of patient problem list and past encounter diagnosis. ASSESSMENT AND PLAN:  STS Cardiac Surgery Risk profile: CABG (pending carotid duplex, pfts)     Mortality:  1.67%  Renal Failure:  0.78%  Permanent Stroke:  0.83%  Prolonged Ventilation:  8.86%  Deep Sternal Infection:  0.17%  Reoperation:  1.93%  Morbidity and Mortality:  12.01%  Short LOS:  56.06%  Long LOS:  3.79%    Mr. Ericka Caraballo is a 56year old, current smoker, admitted with inferior STEMI who was taken for emergent LHC and found to have severe multivessel CAD including 100% prox-mid RCA occlusion and 70% distal LM stenosis. Successful PCI of the RCA with single DANIA was performed. Currently on Integrilin gtt. Chest pain free at time of my exam. He did receive a loading dose of Brilinta today. His distal LM disease is not amenable to PCI. Recommend surgical revascularization.  Will plan for CABG x 2 on Monday (2nd case) to allow time for Brilinta metabolism as long as he remains stable in the interim. This plan was discussed with the patient along with his wife and son at the bedside. The patient is in agreement and wishes to proceed. Will continue with pre-operative testing and risk stratification. Carotid duplex. Vein mapping. PFTs. Echo. UA.      Rashi Galeas, JESSICA - CNP  3/24/2022  8:52 PM

## 2022-03-24 NOTE — CONSULTS
[facility]  [phone]      Attending Physician: Charissa Freeman MD  Reason for Consultation/Chief Complaint: Chest pain    Subjective   History of Present Illness:  Luis Mendez is a 56 y.o. patient who presented to the hospital with complaints of chest pain, patient unable to provide much history as he is in distress with chest pain, patient presented emergency room and was found to have acute inferior STEMI. He denies any prior cardiac history. He says his only medical history includes cavities. Past Medical History:  Dental caries        Social History:  Smoker    Family History:  Positive family history diabetes    Home Medications:  Were reviewed and are listed in nursing record and/or below  Prior to Admission medications    Not on File        CURRENT Medications:  heparin (porcine) injection 5,000 Units, Once  ticagrelor (BRILINTA) tablet 180 mg, BID        Allergies:  Patient has no known allergies. Review of Systems:   Unable to obtain, patient is in distress with chest pain.     Objective   PHYSICAL EXAM:    Heart rate 75  Blood pressure 123/80       General Appearance:  Alert, cooperative, in distress, appears stated age   Head:  Normocephalic, without obvious abnormality, atraumatic   Eyes:   conjunctiva/corneas clear   Throat: Lips, mucosa, and tongue dry   Neck: Supple, no JVP   Lungs:   Clear to auscultation bilaterally, respirations labored   Chest Wall:  No deformity or tenderness   Heart:  Regular rate and rhythm, S1, S2 normal, no murmur, rub or gallop   Abdomen:   Soft, non-tender   Extremities: Extremities normal, atraumatic, no cyanosis or edema   Pulses: 2+ and symmetric in upper extremities   Skin: Skin color, texture, turgor normal, no rashes or lesions   Pysch:  Anxious mood and affect   Neurologic: Normal gross motor and sensory exam.         Labs   CBC: No results found for: WBC, RBC, HGB, HCT, MCV, RDW, PLT  CMP:No results found for: NA, K, CL, CO2, BUN, CREATININE, GFRAA, AGRATIO, LABGLOM, GLUCOSE, PROT, CALCIUM, BILITOT, ALKPHOS, AST, ALT  PT/INR:  No results found for: PTINR  HgBA1c:No results found for: LABA1C  No results found for: CKTOTAL, CKMB, CKMBINDEX, TROPONINI      Cardiac Data     Last EKG: Normal sinus rhythm, inferior ST elevation    Echo:    Stress Test:    Cath:    Studies:       I have reviewed labs and imaging/xray/diagnostic testing in this note. Assessment and Plan          Acute inferior STEMI    Plan on emergency heart catheterization    [facility] [id number]      Thank you for allowing us to participate in the care of [place] [address]. Please call me with any questions [phone].     Remi Mcgee MD, [facility]   Interventional Cardiologist  [facility]  [phone] [facility]  [phone] [address] [place] [address]  3/24/2022 12:09 PM

## 2022-03-24 NOTE — ED NOTES
@2177 received squad EKG  @1148  called \"ED CODE STEMI\"  @1256 spoke to cardiology RN-Shaneka re: interventional cardiology consult for STEMI  @1150 paged ED code stemi overhead   Johnny & cath team in ED  @1200 pt arrived in ED room #4  @1182 ED EKG done  @1211 pt leaving ED to cath lab     [address] [facility]  03/24/22 1215

## 2022-03-24 NOTE — PROGRESS NOTES
Brief Pre-Op Note/Sedation Assessment      Wilmer Isaac  1965  [id number]  12:12 PM    Planned Procedure: Cardiac Catheterization Procedure  Post Procedure Plan: Return to same level of care  Consent: Consent was unable to be obtained due to patient's condition. Chief Complaint:   Chest Pain/Pressure  STEMI      Indications for Cath Procedure:  1. Presentation:  ACS <= 24 hrs  2. Anginal Classification within 2 weeks:  CCS IV - Inability to perform any activity without angina or angina at rest, i.e., severe limitation  3. Angina Symptoms Assessment:  Typical Chest Pain  4. Heart Failure Class within last 2 weeks:  No symptoms  5. Cardiovascular Instability:  No    Prior Ischemic Workup/Eval:  1. Pre-Procedural Medications: Yes: Aspirin  2. Stress Test Completed? No    Does Patient need surgery? Cath Valve Surgery:  No    Pre-Procedure Medical History:  Vital Signs: There were no vitals taken for this visit. Allergies:  No Known Allergies  Medications:    Current Facility-Administered Medications   Medication Dose Route Frequency Provider Last Rate Last Admin    heparin (porcine) injection 5,000 Units  5,000 Units SubCUTAneous Once Devan Wolfe MD        ticagrelor [facility]) tablet 180 mg  180 mg Oral BID Devan Wolfe MD   180 mg at 03/24/22 1206     No current outpatient medications on file. Past Medical History:  No past medical history on file. Surgical History:  No past surgical history on file. Pre-Sedation:  Pre-Sedation Documentation and Exam:  I have personally completed a history, physical exam & review of systems for this patient (see notes). Prior History of Anesthesia Complications:   none    Modified Mallampati:  III (soft palate, base of uvula visible)    ASA Classification:  Class 4 - A patient with an incapacitating systemic disease that is a constant threat to life    Francisco J Scale:   Activity:  2 - Able to move 4 extremities voluntarily on command  Respiration:  1 - Dyspnic, shallow, or limited breathing  Circulation:  2 - BP+/- 20mmHg of normal  Consciousness:  2 - Fully awake  Oxygen Saturation (color):  1 - Needs oxygen to maintain oxygen saturation >90%    Sedation/Anesthesia Plan:  Guard the patient's safety and welfare. Minimize physical discomfort and pain. Minimize negative psychological responses to treatment by providing sedation and analgesia and maximize the potential amnesia. Patient to meet pre-procedure discharge plan.     Medication Planned:  midazolam intravenously and fentanyl intravenously    Patient is an appropriate candidate for plan of sedation:   yes      Electronically signed by Eliane Lsat MD on 3/24/2022 at 12:12 PM

## 2022-03-25 LAB
ANION GAP SERPL CALCULATED.3IONS-SCNC: 11 MMOL/L (ref 3–16)
BUN BLDV-MCNC: 10 MG/DL (ref 7–20)
CALCIUM SERPL-MCNC: 8.6 MG/DL (ref 8.3–10.6)
CHLORIDE BLD-SCNC: 106 MMOL/L (ref 99–110)
CO2: 20 MMOL/L (ref 21–32)
CREAT SERPL-MCNC: 0.8 MG/DL (ref 0.9–1.3)
EKG ATRIAL RATE: 66 BPM
EKG DIAGNOSIS: NORMAL
EKG P AXIS: 50 DEGREES
EKG P-R INTERVAL: 138 MS
EKG Q-T INTERVAL: 396 MS
EKG QRS DURATION: 90 MS
EKG QTC CALCULATION (BAZETT): 415 MS
EKG R AXIS: 19 DEGREES
EKG T AXIS: -12 DEGREES
EKG VENTRICULAR RATE: 66 BPM
GFR AFRICAN AMERICAN: >60
GFR NON-AFRICAN AMERICAN: >60
GLUCOSE BLD-MCNC: 109 MG/DL (ref 70–99)
HCT VFR BLD CALC: 36.9 % (ref 40.5–52.5)
HEMOGLOBIN: 12.3 G/DL (ref 13.5–17.5)
MCH RBC QN AUTO: 31.6 PG (ref 26–34)
MCHC RBC AUTO-ENTMCNC: 33.4 G/DL (ref 31–36)
MCV RBC AUTO: 94.6 FL (ref 80–100)
PDW BLD-RTO: 13.1 % (ref 12.4–15.4)
PLATELET # BLD: 198 K/UL (ref 135–450)
PMV BLD AUTO: 8.1 FL (ref 5–10.5)
POTASSIUM REFLEX MAGNESIUM: 4.3 MMOL/L (ref 3.5–5.1)
RBC # BLD: 3.9 M/UL (ref 4.2–5.9)
SODIUM BLD-SCNC: 137 MMOL/L (ref 136–145)
TROPONIN: 4.33 NG/ML
TSH SERPL DL<=0.05 MIU/L-ACNC: 1.54 UIU/ML (ref 0.27–4.2)
WBC # BLD: 9 K/UL (ref 4–11)

## 2022-03-25 PROCEDURE — 6360000002 HC RX W HCPCS: Performed by: INTERNAL MEDICINE

## 2022-03-25 PROCEDURE — 93010 ELECTROCARDIOGRAM REPORT: CPT | Performed by: INTERNAL MEDICINE

## 2022-03-25 PROCEDURE — 85027 COMPLETE CBC AUTOMATED: CPT

## 2022-03-25 PROCEDURE — 99232 SBSQ HOSP IP/OBS MODERATE 35: CPT | Performed by: NURSE PRACTITIONER

## 2022-03-25 PROCEDURE — 84443 ASSAY THYROID STIM HORMONE: CPT

## 2022-03-25 PROCEDURE — 99233 SBSQ HOSP IP/OBS HIGH 50: CPT | Performed by: NURSE PRACTITIONER

## 2022-03-25 PROCEDURE — 6370000000 HC RX 637 (ALT 250 FOR IP): Performed by: INTERNAL MEDICINE

## 2022-03-25 PROCEDURE — 84484 ASSAY OF TROPONIN QUANT: CPT

## 2022-03-25 PROCEDURE — 2580000003 HC RX 258: Performed by: INTERNAL MEDICINE

## 2022-03-25 PROCEDURE — 80048 BASIC METABOLIC PNL TOTAL CA: CPT

## 2022-03-25 PROCEDURE — 36415 COLL VENOUS BLD VENIPUNCTURE: CPT

## 2022-03-25 PROCEDURE — 6370000000 HC RX 637 (ALT 250 FOR IP): Performed by: NURSE PRACTITIONER

## 2022-03-25 PROCEDURE — 2000000000 HC ICU R&B

## 2022-03-25 RX ADMIN — MUPIROCIN: 20 OINTMENT TOPICAL at 20:59

## 2022-03-25 RX ADMIN — ACETAMINOPHEN 650 MG: 325 TABLET ORAL at 14:06

## 2022-03-25 RX ADMIN — CARVEDILOL 3.12 MG: 3.12 TABLET, FILM COATED ORAL at 16:55

## 2022-03-25 RX ADMIN — MUPIROCIN: 20 OINTMENT TOPICAL at 09:41

## 2022-03-25 RX ADMIN — CARVEDILOL 3.12 MG: 3.12 TABLET, FILM COATED ORAL at 09:40

## 2022-03-25 RX ADMIN — ASPIRIN 81 MG 81 MG: 81 TABLET ORAL at 09:40

## 2022-03-25 RX ADMIN — SODIUM CHLORIDE, PRESERVATIVE FREE 10 ML: 5 INJECTION INTRAVENOUS at 20:59

## 2022-03-25 RX ADMIN — ATORVASTATIN CALCIUM 80 MG: 80 TABLET, FILM COATED ORAL at 20:58

## 2022-03-25 RX ADMIN — EPTIFIBATIDE 2 MCG/KG/MIN: 0.75 INJECTION INTRAVENOUS at 21:01

## 2022-03-25 RX ADMIN — LISINOPRIL 5 MG: 5 TABLET ORAL at 09:41

## 2022-03-25 RX ADMIN — EPTIFIBATIDE 2 MCG/KG/MIN: 0.75 INJECTION INTRAVENOUS at 13:39

## 2022-03-25 RX ADMIN — EPTIFIBATIDE 2 MCG/KG/MIN: 0.75 INJECTION INTRAVENOUS at 06:47

## 2022-03-25 RX ADMIN — Medication 1 TABLET: at 09:40

## 2022-03-25 ASSESSMENT — ENCOUNTER SYMPTOMS
CHEST TIGHTNESS: 1
SHORTNESS OF BREATH: 0

## 2022-03-25 ASSESSMENT — PAIN SCALES - GENERAL
PAINLEVEL_OUTOF10: 3
PAINLEVEL_OUTOF10: 0

## 2022-03-25 NOTE — CARE COORDINATION
CASE MANAGEMENT INITIAL ASSESSMENT      Reviewed chart and completed assessment with patient: and spouse at bedside  Family present: yes  Explained Case Management role/services. Primary contact information:    Health Care Decision Maker :   Primary Decision Maker: Karina Cortez - Spouse - [phone]          Can this person be reached and be able to respond quickly, such as within a few minutes or hours? Yes  Who would be your back-up decision maker? Name None named  Phone Number:    Admit date/status:3/24 Сергей Scott  Is this a Readmission?:  No      Insurance:[place] None listed in thinkingphones- email to [phone] [place] to update payor  Precert required for SNF: Yes       3 night stay required: No    Living arrangements, Adls, care needs, prior to admission:Lives w spouse in a 2 story home w 2 RAO- primarily uses first floor. Pt IPTA- no DME or services- pt drives. Owns a landscaping business.     Durable Medical Equipment at home:  Walker__Cane__RTS__ BSC__Shower Chair__  02__ HHN__ CPAP__  BiPap__  Hospital Bed__ W/C___ Other_none____    Services in the home and/or outpatient, prior to admission:none    Current PCP:None    Transportation needs: spouse if needed    ·     PT/OT recs:pending post op    Hospital Exemption Notification (HEN):na    Barriers to discharge:none known    Plan/comments:plan CABG next week     ECOC on chart for MD signature    Alison Green, RN

## 2022-03-25 NOTE — PROGRESS NOTES
Received report from night shift RN. Pt did has some episodes of Vtach overnight, required 500ml bolus and 5mg Lopressor. Pt unhappy with the amount of times he has been stuck for blood draws.

## 2022-03-25 NOTE — PROGRESS NOTES
CVTS Thoracic Progress Note:          CC:  Severe multivessel CAD, inferior STEMI     Subj: Denies chest pain or shortness of breath. Obj:    Blood pressure (!) 134/94, pulse 73, temperature 97.7 °F (36.5 °C), temperature source Oral, resp. rate 20, height 5' 11\" (1.803 m), weight 197 lb 1.5 oz (89.4 kg), SpO2 97 %. Alert, oriented    S1 S2 normal. SR on monitor     Lungs ctab    Abdomen soft, non-tender. Normoactive bowel sounds    No lower extremity edema     Diagnostics:   Recent Labs     03/24/22  1210 03/24/22  1240 03/25/22  0500   WBC 12.1* 11.0 9.0   HGB 13.0* 12.2* 12.3*   HCT 38.8* 36.3* 36.9*    213 198                                                                  Recent Labs     03/24/22  1240 03/25/22  0421   * 137   K 3.5 4.3    106   CO2 22 20*   BUN 16 10   CREATININE 0.8* 0.8*   GLUCOSE 125* 109*     Recent Labs     03/24/22  1240 03/24/22  1513 03/25/22  0421   TROPONINI <0.01 6.01* 4.33*       CXR: 3/24/22  FINDINGS:   The cardiomediastinal silhouette is unremarkable.  The lungs are clear.  No   infiltrate, pleural fluid or evidence of overt failure.           Impression   No acute cardiopulmonary disease.         Vein mapping: 3/24/22  LE Vein Mapping       +----------------------------------++--------+-----+----+--------+-----+   ! Superficial - Great Saphenous Vein! !Right   !     !Left!        !     !   +----------------------------------++--------+-----+----+--------+-----+   ! Location                          ! ! Diameter! Depth!    !Diameter! Depth!   +----------------------------------++--------+-----+----+--------+-----+   ! Sapheno Femoral Junction          !!3.8     !     !    !4.07    !     !   +----------------------------------++--------+-----+----+--------+-----+   ! GSV High Thigh                    !!3.66    !     !    !3. 4     !     !   +----------------------------------++--------+-----+----+--------+-----+   ! GSV Mid Thigh                     !!3.77    !     !    !2.81    !     !   +----------------------------------++--------+-----+----+--------+-----+   ! GSV Low Thigh                     !!3.71    !     !    !2.88    !     !   +----------------------------------++--------+-----+----+--------+-----+   ! GSV Knee                          !!3.77    !     !    !1.78    !     !   +----------------------------------++--------+-----+----+--------+-----+   ! GSV High Calf                     !!2.01    !     !    !1.64    !     !   +----------------------------------++--------+-----+----+--------+-----+   ! GSV Mid Calf                      !!1.83    !     !    !1. 6     !     !   +----------------------------------++--------+-----+----+--------+-----+   ! GSV Low Calf                      !!1.55    !     !    !1.41    !     !   +----------------------------------++--------+-----+----+--------+-----+   ! GSV Ankle                         !!1.73    !     !    !2.33    !     !     Carotid duplex: 3/24/22  Summary        The bilateral internal carotid arteries reveal a <50% diameter reducing    stenosis.    The bilateral vertebral arteries demonstrate normal antegrade flow. TTE: 3/24/22   Summary   Normal left ventricle size, wall thickness, and systolic function with an   estimated ejection fraction of 40-45%. Mild hypokinesis of the inferior, inferoseptal wall segments. Normal left ventricular diastolic filling pressures. The right ventricle is normal in size and function. Trace mitral and tricuspid valve regurgitation. Systolic pulmonary artery pressure (sPAP) is normal and estimated at 23 mmHg   (right atrial pressure 3 mmHg)   Definity® used for myocardial border enhancement. Assess/Plan:   AM labs and imaging reviewed as above. IM and Cardiology notes reviewed.      CAD, inferior STEMI   -ASA, statin, BB, ACE  -s/p PCI with DANIA to RCA, severe LM disease   -Integrilin gtt   -Brilinta washout (loading dose 3/24)   -CABG planned for Tuesday 3/29  -Continue with pre-operative testing and risk stratification: vein mapping, carotid duplex and echo reviewed as above. UA unremarkable.  Awaiting beside spirometry  ________________________________________________________________    JESSICA Ramos - CNP  3/25/2022  10:48 AM

## 2022-03-25 NOTE — PROGRESS NOTES
[address]  Cardiology  Progress Note    Admission date:  3/24/2022    Reason for follow up visit: STEMI    HPI/CC: Nadeen Banegas is a 56 y.o. male who presented 3/24/2022 for chest pain and found to have inferior STEMI. LHC showed 100% RCA treated with DANIA. Also found to have LM disease, CT surgery consulted, plan for CABG 3/29/2022. Echo showed EF 40-45%. Rhythm has been sinus. Subjective: Chest tightness has improved though not completely resolved. No shortness of breath. Vitals:  Blood pressure 99/63, pulse 67, temperature 97.7 °F (36.5 °C), temperature source Oral, resp. rate 14, height 5' 11\" (1.803 m), weight 197 lb 1.5 oz (89.4 kg), SpO2 96 %.   Temp  Av.8 °F (36.6 °C)  Min: 97.6 °F (36.4 °C)  Max: 98.3 °F (36.8 °C)  Pulse  Av.8  Min: 53  Max: 91  BP  Min: 84/63  Max: 140/128  SpO2  Av.6 %  Min: 96 %  Max: 99 %    24 hour I/O    Intake/Output Summary (Last 24 hours) at 3/25/2022 1351  Last data filed at 3/25/2022 0600  Gross per 24 hour   Intake 385 ml   Output 1450 ml   Net -1065 ml     Current Facility-Administered Medications   Medication Dose Route Frequency Provider Last Rate Last Admin    eptifibatide (INTEGRILIN) 0.75 mg/mL infusion  2 mcg/kg/min (Order-Specific) IntraVENous Continuous Daria Tim MD 13.4 mL/hr at 22 1339 2 mcg/kg/min at 22 1339    sodium chloride flush 0.9 % injection 5-40 mL  5-40 mL IntraVENous 2 times per day Madiha Bergman MD   10 mL at 22    sodium chloride flush 0.9 % injection 5-40 mL  5-40 mL IntraVENous PRN Madiha Bergman MD        carvedilol (COREG) tablet 3.125 mg  3.125 mg Oral BID  Madiha Bergman MD   3.125 mg at 22 0940    lisinopril (PRINIVIL;ZESTRIL) tablet 5 mg  5 mg Oral Daily Madiha Bergman MD   5 mg at 22 09    aspirin chewable tablet 81 mg  81 mg Oral Daily Madiha Bergman MD   81 mg at 22    perflutren lipid microspheres (DEFINITY) injection 1.65 mg  1.5 mL IntraVENous ONCE PRN Go Chris MD        0.9 % sodium chloride infusion  25 mL IntraVENous PRN Go Chris MD        ondansetron (ZOFRAN-ODT) disintegrating tablet 4 mg  4 mg Oral Q8H PRN Go Chris MD        Or    [facility] injection 4 mg  4 mg IntraVENous Q6H PRN Go Chris MD        acetaminophen (TYLENOL) tablet 650 mg  650 mg Oral Q6H PRN Go Chris MD        Or    acetaminophen (TYLENOL) suppository 650 mg  650 mg Rectal Q6H PRN Go Chris MD        polyethylene glycol [facility]) packet 17 g  17 g Oral Daily PRN Go Chris MD        atorvastatin (LIPITOR) tablet 80 mg  80 mg Oral Nightly Go Chris MD   80 mg at 03/24/22 2021    enoxaparin (LOVENOX) injection 40 mg  40 mg SubCUTAneous Daily Go Chris MD        morphine (PF) injection 2 mg  2 mg IntraVENous Q4H PRN Go Chris MD        Or   Donaldo Brewster morphine sulfate (PF) injection 4 mg  4 mg IntraVENous Q4H PRN Go Chris MD        mupirocin [facility]) 2 % ointment   Nasal BID Go Chris MD   Given at 03/25/22 4405    therapeutic multivitamin-minerals 1 tablet  1 tablet Oral Daily [address], APRN - CNP   1 tablet at 03/25/22 0940     Review of Systems   Constitutional: Positive for fatigue. Respiratory: Positive for chest tightness. Negative for shortness of breath. Cardiovascular: Positive for chest pain. Neurological: Negative.       Objective:     Telemetry monitor: SR    Physical Exam:  Constitutional:  Comfortable and alert, NAD, appears stated age  Eyes: PERRL, sclera nonicteric  Neck:  Supple, no masses, no thyroidmegaly, no JVD  Skin:  Warm and dry; no rash or lesions  Heart: Regular, normal apex, S1 and S2 normal, no M/G/R  Lungs:  Normal respiratory effort; clear; no wheezing/rhonchi/rales  Abdomen: soft, non tender, + bowel sounds  Extremities:  No edema or cyanosis; no clubbing  Neuro: alert and oriented, moves legs and arms equally, normal mood and affect  Right radial site soft, no hematoma, 2+ pulse    Data Reviewed:    Echo 3/24/2022:  Normal left ventricle size, wall thickness, and systolic function with an   estimated ejection fraction of 40-45%. Mild hypokinesis of the inferior, inferoseptal wall segments. Normal left ventricular diastolic filling pressures. The right ventricle is normal in size and function. Trace mitral and tricuspid valve regurgitation. Systolic pulmonary artery pressure (sPAP) is normal and estimated at 23 mmHg   (right atrial pressure 3 mmHg)   Definity® used for myocardial border enhancement. Coronary angiogram 3/24/2022:  Acute inferior STEMI  PROCEDURES PERFORMED    Left heart catheterization  LVgram  Coronary angiogam  Coronary cath  Monitoring of moderate conscious sedation   Mechanical thrombectomy of RCA  IVUS of RCA  PCI of RCA with single drug-eluting sten  PROCEDURE DESCRIPTION    This was felt to be an emergency procedure. Patient was prepped draped in the usual sterile fashion. Local anaesthetic was applied over puncture site. Using a back wall technique, a 6 Turkish Terumo sheath was inserted into right radial artery. Verapamil, nitroglycerin, nicardipine were administered through the sheath. Heparin was administered. Diagnostic 5 Indian pigtail, Ultram catheters were used for diagnostic angiograms. At the conclusion of the procedure, a TR band was placed over the puncture site and hemostasis was obtained. There were no immediate complications. I supervised sedation from 12:17 PM to 1:08 PM with versed 6 mg/fentanyl 200 mcg during the procedure. An independent trained observer pushed meds at my direction. We monitored the patient's level of consciousness and vital signs/physiologic status throughout the procedure duration (see times listed previously). 130 cc contrast was utilized. <20cc EBL.   FINDINGS     LVEDP  17   GRADIENT ACROSS AORTIC VALVE  none   LV FUNCTION EF 35%   WALL MOTION inferior hypokinesis   MITRAL REGURGITATION  mild     LM  Less than 10% proximal stenosis, mid 20-30% stenosis. There is distal 70% stenosis.       LAD  Proximal 20% stenosis, mid 60 to 70% stenosis, distal less than 10% stenosis.     D1 has 20% aoiwsgfp-[id number]-znslnw stenosis.       LCX  Less than 10% cogxcdxg-iif-apzifq stenosis.       RI  Difficult to fully visualize on study, is a small to medium size vessel, there does not appear to be severe obstruction, there does appear to be less than 10% [id number] stenosis.       RCA Dominant, large vessel, proximal-mid 100% occlusion, distal vessel was ultimately visualized on completion angiography is less than 10% stenosis. There is thrombus present in the mid vessel. PERCUTANEOUS INTERVENTION DESCRIPTION    Heparin was used for any coagulation, patient been preloaded with Brilinta, Integrilin was given during the procedure. Initially a 6 [employer] JR 5 guiding catheter used to intubate the RCA. A Lolay wire was used to cross the lesion. Mechanical thrombectomy was performed with the penumbra device. Flow was restored to this. IVUS was performed which showed thrombus/dissected plaque in the mid vessel and this was then treated with stenting with Medtronic resolute [place] 4 x 38 mm drug-eluting stent. Stent was postdilated with 4.5 and 5 mm noncompliant balloons. Follow-up IVUS showed good stent apposition/expansion. There was no residual dissection or thrombus noted. CONCLUSIONS:    Severe left main disease  Successful PCI of RCA with single drug-eluting stent   Continue Integrilin indefinitely, will consult with CT surgery, case discussed with Dr. Nelly Duarte in Cath Lab, plan for CABG at some point in the future.     Lab Reviewed:     Renal Profile:  Lab Results   Component Value Date    CREATININE 0.8 03/25/2022    BUN 10 03/25/2022     03/25/2022    K 4.3 03/25/2022     03/25/2022    CO2 20 03/25/2022     CBC:    Lab Results   Component Value Date    WBC 9.0 03/25/2022    RBC 3.90 03/25/2022    HGB 12.3 03/25/2022    HCT 36.9 03/25/2022    MCV 94.6 03/25/2022    RDW 13.1 03/25/2022     03/25/2022     BNP:  No results found for: PROBNP  Fasting Lipid Panel:  No results found for: CHOL, HDL, TRIG  Cardiac Enzymes:  CK/MbTroponin  Lab Results   Component Value Date    TROPONINI 4.33 03/25/2022     PT/ INR No results found for: INR, PROTIME  PTT No results found for: PTT No results found for: MG No results found for: TSH    All labs and imaging reviewed today    Assessment:  STEMI/CAD: s/p DANIA RCA 3/24/2022   - CABG planned for 3/29/2022 for residual CAD/severe LM  Ischemic cardiomyopathy: EF 40-45%  Tobacco abuse: counseled    Plan:   1. Intergrilin gtt until CABG 3/29/2022, brilinta washout, last dose 3/24/2022  2. Continue aspirin, statin, carvedilol, lisinopril  3. Check lipids  4. More than 45 minutes of time was spent in direct patient contact during this visit, more than 50% of which was spent educating regarding CAD, procedural details and lifestyle modifications.     JESSICA Bell-CNP  [address]  [phone]

## 2022-03-25 NOTE — PLAN OF CARE
Problem: Falls - Risk of:  Goal: Will remain free from falls  Description: Will remain free from falls  3/25/2022 0533 by Marija Muñoz RN  Outcome: Ongoing  3/24/2022 1938 by Ibrahima Hernandez RN  Outcome: Ongoing     Problem: Pain - Acute:  Goal: Pain level will decrease  Description: Pain level will decrease  3/25/2022 0533 by Marija Muñoz RN  Outcome: Ongoing  3/24/2022 1938 by Ibrahima Hernandez RN  Outcome: Ongoing  Patient denies pain

## 2022-03-25 NOTE — PROGRESS NOTES
Pt refusing Lovenox at this time. Pt does not like needles and does not want to be stuck. Informed patient and wife of risks.

## 2022-03-25 NOTE — PROGRESS NOTES
Hospitalist Progress Note      PCP: No primary care provider on file. Date of Admission: 3/24/2022    Chief Complaint: Chest Pain    Subjective: no new c/o. Medications:  Reviewed    Infusion Medications    eptifibatide 2 mcg/kg/min (03/25/22 0647)    sodium chloride       Scheduled Medications    sodium chloride flush  5-40 mL IntraVENous 2 times per day    carvedilol  3.125 mg Oral BID WC    lisinopril  5 mg Oral Daily    aspirin  81 mg Oral Daily    atorvastatin  80 mg Oral Nightly    enoxaparin  40 mg SubCUTAneous Daily    mupirocin   Nasal BID    multivitamin  1 tablet Oral Daily     PRN Meds: sodium chloride flush, perflutren lipid microspheres, sodium chloride, ondansetron **OR** ondansetron, acetaminophen **OR** acetaminophen, polyethylene glycol, morphine **OR** morphine      Intake/Output Summary (Last 24 hours) at 3/25/2022 0902  Last data filed at 3/25/2022 0600  Gross per 24 hour   Intake 385 ml   Output 1450 ml   Net -1065 ml       Physical Exam Performed:    BP (!) 95/53   Pulse 60   Temp 97.7 °F (36.5 °C) (Oral)   Resp 20   Ht 5' 11\" (1.803 m)   Wt 197 lb 1.5 oz (89.4 kg)   SpO2 97%   BMI 27.49 kg/m²     General appearance: No apparent distress, appears stated age and cooperative. HEENT: Pupils equal, round, and reactive to light. Conjunctivae/corneas clear. Neck: Supple, with full range of motion. No jugular venous distention. Trachea midline. Respiratory:  Normal respiratory effort. Clear to auscultation, bilaterally without Rales/Wheezes/Rhonchi. Cardiovascular: Regular rate and rhythm with normal S1/S2 without murmurs, rubs or gallops. Abdomen: Soft, non-tender, non-distended with normal bowel sounds. Musculoskeletal: No clubbing, cyanosis or edema bilaterally. Full range of motion without deformity. Skin: Skin color, texture, turgor normal.  No rashes or lesions. Neurologic:  Neurovascularly intact without any focal sensory/motor deficits.  Cranial nerves: II-XII intact, grossly non-focal.  Psychiatric: Alert and oriented, thought content appropriate, normal insight  Capillary Refill: Brisk,< 3 seconds   Peripheral Pulses: +2 palpable, equal bilaterally       Labs:   Recent Labs     03/24/22  1210 03/24/22  1240 03/25/22  0500   WBC 12.1* 11.0 9.0   HGB 13.0* 12.2* 12.3*   HCT 38.8* 36.3* 36.9*    213 198     Recent Labs     03/24/22  1240 03/25/22  0421   * 137   K 3.5 4.3    106   CO2 22 20*   BUN 16 10   CREATININE 0.8* 0.8*   CALCIUM 8.4 8.6     Recent Labs     03/24/22  1240   AST 20   ALT 19   BILITOT 0.7   ALKPHOS 121     No results for input(s): INR in the last 72 hours. Recent Labs     03/24/22  1240 03/24/22  1513 03/25/22  0421   TROPONINI <0.01 6.01* 4.33*       Urinalysis:      Lab Results   Component Value Date    NITRU Negative 03/24/2022    WBCUA 0-2 03/24/2022    RBCUA 0-2 03/24/2022    BLOODU MODERATE 03/24/2022    SPECGRAV 1.010 03/24/2022    GLUCOSEU Negative 03/24/2022       Consults:    IP CONSULT TO CARDIOLOGY  IP CONSULT TO HOSPITALIST  IP CONSULT TO CARDIAC REHAB      Assessment/Plan:    Active Hospital Problems    Diagnosis     CAD in native artery [I25.10]     STEMI (ST elevation myocardial infarction) ([facility] [address] [I21.3]          CAD/Inferior STEMI: s/p PCI of mid RCA. Also has 70% left main. CT Surgery consulted and planning for CABG on Monday. Pre-op eval underway. Continue medical management with Integrillin. Pain control as needed.        DVT Prophylaxis: LMWH    Recent Labs     03/24/22  1210 03/24/22  1240 03/25/22  0500    213 198     Diet: ADULT DIET; Regular  Code Status: Full Code      PT/OT Eval Status: not yet ordered. Dispo - Remains in ICU. Patient is likely to remain in-house for the foreseeable future pending post-CABG course.        Marai T Rice MD

## 2022-03-26 LAB
ANION GAP SERPL CALCULATED.3IONS-SCNC: 8 MMOL/L (ref 3–16)
BUN BLDV-MCNC: 18 MG/DL (ref 7–20)
CALCIUM SERPL-MCNC: 9.3 MG/DL (ref 8.3–10.6)
CHLORIDE BLD-SCNC: 105 MMOL/L (ref 99–110)
CO2: 25 MMOL/L (ref 21–32)
CREAT SERPL-MCNC: 0.9 MG/DL (ref 0.9–1.3)
GFR AFRICAN AMERICAN: >60
GFR NON-AFRICAN AMERICAN: >60
GLUCOSE BLD-MCNC: 111 MG/DL (ref 70–99)
HCT VFR BLD CALC: 36.3 % (ref 40.5–52.5)
HEMOGLOBIN: 12.2 G/DL (ref 13.5–17.5)
MCH RBC QN AUTO: 31.9 PG (ref 26–34)
MCHC RBC AUTO-ENTMCNC: 33.7 G/DL (ref 31–36)
MCV RBC AUTO: 94.7 FL (ref 80–100)
PDW BLD-RTO: 13.1 % (ref 12.4–15.4)
PLATELET # BLD: 196 K/UL (ref 135–450)
PMV BLD AUTO: 8.4 FL (ref 5–10.5)
POTASSIUM REFLEX MAGNESIUM: 4 MMOL/L (ref 3.5–5.1)
RBC # BLD: 3.83 M/UL (ref 4.2–5.9)
SODIUM BLD-SCNC: 138 MMOL/L (ref 136–145)
WBC # BLD: 8.6 K/UL (ref 4–11)

## 2022-03-26 PROCEDURE — 99232 SBSQ HOSP IP/OBS MODERATE 35: CPT | Performed by: NURSE PRACTITIONER

## 2022-03-26 PROCEDURE — 6370000000 HC RX 637 (ALT 250 FOR IP): Performed by: INTERNAL MEDICINE

## 2022-03-26 PROCEDURE — 85027 COMPLETE CBC AUTOMATED: CPT

## 2022-03-26 PROCEDURE — 2580000003 HC RX 258: Performed by: INTERNAL MEDICINE

## 2022-03-26 PROCEDURE — 6360000002 HC RX W HCPCS: Performed by: INTERNAL MEDICINE

## 2022-03-26 PROCEDURE — 2000000000 HC ICU R&B

## 2022-03-26 PROCEDURE — 6370000000 HC RX 637 (ALT 250 FOR IP): Performed by: NURSE PRACTITIONER

## 2022-03-26 PROCEDURE — 36415 COLL VENOUS BLD VENIPUNCTURE: CPT

## 2022-03-26 PROCEDURE — 80048 BASIC METABOLIC PNL TOTAL CA: CPT

## 2022-03-26 RX ADMIN — EPTIFIBATIDE 2 MCG/KG/MIN: 0.75 INJECTION INTRAVENOUS at 19:19

## 2022-03-26 RX ADMIN — EPTIFIBATIDE 2 MCG/KG/MIN: 0.75 INJECTION INTRAVENOUS at 13:11

## 2022-03-26 RX ADMIN — LISINOPRIL 5 MG: 5 TABLET ORAL at 09:47

## 2022-03-26 RX ADMIN — SODIUM CHLORIDE, PRESERVATIVE FREE 10 ML: 5 INJECTION INTRAVENOUS at 20:33

## 2022-03-26 RX ADMIN — ATORVASTATIN CALCIUM 80 MG: 80 TABLET, FILM COATED ORAL at 20:32

## 2022-03-26 RX ADMIN — ASPIRIN 81 MG 81 MG: 81 TABLET ORAL at 09:47

## 2022-03-26 RX ADMIN — CARVEDILOL 3.12 MG: 3.12 TABLET, FILM COATED ORAL at 17:37

## 2022-03-26 RX ADMIN — EPTIFIBATIDE 2 MCG/KG/MIN: 0.75 INJECTION INTRAVENOUS at 04:29

## 2022-03-26 RX ADMIN — MUPIROCIN: 20 OINTMENT TOPICAL at 09:48

## 2022-03-26 RX ADMIN — Medication 1 TABLET: at 09:47

## 2022-03-26 RX ADMIN — ACETAMINOPHEN 650 MG: 325 TABLET ORAL at 04:25

## 2022-03-26 RX ADMIN — MUPIROCIN: 20 OINTMENT TOPICAL at 20:33

## 2022-03-26 RX ADMIN — SODIUM CHLORIDE, PRESERVATIVE FREE 10 ML: 5 INJECTION INTRAVENOUS at 09:48

## 2022-03-26 ASSESSMENT — PAIN SCALES - GENERAL
PAINLEVEL_OUTOF10: 0
PAINLEVEL_OUTOF10: 0
PAINLEVEL_OUTOF10: 2
PAINLEVEL_OUTOF10: 0
PAINLEVEL_OUTOF10: 0

## 2022-03-26 ASSESSMENT — ENCOUNTER SYMPTOMS
CHEST TIGHTNESS: 1
SHORTNESS OF BREATH: 0

## 2022-03-26 NOTE — PROGRESS NOTES
CVTS Thoracic Progress Note:          CC:  Severe multivessel CAD, inferior STEMI     Subj: Denies chest pain or shortness of breath. Obj:    Blood pressure (!) 102/57, pulse 65, temperature 98.5 °F (36.9 °C), resp. rate 18, height 5' 11\" (1.803 m), weight 191 lb 12.8 oz (87 kg), SpO2 92 %. Alert, oriented    S1 S2 normal. SR on monitor     Lungs ctab    Abdomen soft, non-tender. Normoactive bowel sounds    No lower extremity edema     Diagnostics:   Recent Labs     03/24/22  1240 03/25/22  0500 03/26/22  0417   WBC 11.0 9.0 8.6   HGB 12.2* 12.3* 12.2*   HCT 36.3* 36.9* 36.3*    198 196                                                                  Recent Labs     03/24/22  1240 03/25/22  0421 03/26/22  0417   * 137 138   K 3.5 4.3 4.0    106 105   CO2 22 20* 25   BUN 16 10 18   CREATININE 0.8* 0.8* 0.9   GLUCOSE 125* 109* 111*     Recent Labs     03/24/22  1240 03/24/22  1513 03/25/22  0421   TROPONINI <0.01 6.01* 4.33*       CXR: 3/24/22  FINDINGS:   The cardiomediastinal silhouette is unremarkable.  The lungs are clear.  No   infiltrate, pleural fluid or evidence of overt failure.           Impression   No acute cardiopulmonary disease.         Vein mapping: 3/24/22  LE Vein Mapping       +----------------------------------++--------+-----+----+--------+-----+   ! Superficial - Great Saphenous Vein! !Right   !     !Left!        !     !   +----------------------------------++--------+-----+----+--------+-----+   ! Location                          ! ! Diameter! Depth!    !Diameter! Depth!   +----------------------------------++--------+-----+----+--------+-----+   ! Sapheno Femoral Junction          !!3.8     !     !    !4.07    !     !   +----------------------------------++--------+-----+----+--------+-----+   ! GSV High Thigh                    !!3.66    !     !    !3. 4     !     !   +----------------------------------++--------+-----+----+--------+-----+   ! GSV Mid Thigh                     !!3.77    !     !    !2.81    !     !   +----------------------------------++--------+-----+----+--------+-----+   ! GSV Low Thigh                     !!3.71    !     !    !2.88    !     !   +----------------------------------++--------+-----+----+--------+-----+   ! GSV Knee                          !!3.77    !     !    !1.78    !     !   +----------------------------------++--------+-----+----+--------+-----+   ! GSV High Calf                     !!2.01    !     !    !1.64    !     !   +----------------------------------++--------+-----+----+--------+-----+   ! GSV Mid Calf                      !!1.83    !     !    !1. 6     !     !   +----------------------------------++--------+-----+----+--------+-----+   ! GSV Low Calf                      !!1.55    !     !    !1.41    !     !   +----------------------------------++--------+-----+----+--------+-----+   ! GSV Ankle                         !!1.73    !     !    !2.33    !     !     Carotid duplex: 3/24/22  Summary        The bilateral internal carotid arteries reveal a <50% diameter reducing    stenosis.    The bilateral vertebral arteries demonstrate normal antegrade flow. TTE: 3/24/22   Summary   Normal left ventricle size, wall thickness, and systolic function with an   estimated ejection fraction of 40-45%. Mild hypokinesis of the inferior, inferoseptal wall segments. Normal left ventricular diastolic filling pressures. The right ventricle is normal in size and function. Trace mitral and tricuspid valve regurgitation. Systolic pulmonary artery pressure (sPAP) is normal and estimated at 23 mmHg   (right atrial pressure 3 mmHg)   Definity® used for myocardial border enhancement. Assess/Plan:   AM labs and imaging reviewed as above. IM and Cardiology notes reviewed.      CAD, inferior STEMI   -ASA, statin, BB, ACE  -s/p PCI with DANIA to RCA, severe LM disease   -Integrilin gtt   -Brilinta washout (loading dose 3/24)   -CABG planned for Tuesday 3/29  -Continue with pre-operative testing and risk stratification: vein mapping, carotid duplex and echo reviewed as above. UA unremarkable.  Awaiting beside spirometry  ________________________________________________________________    Zahida Leo MD  3/26/2022  9:14 AM

## 2022-03-26 NOTE — PROGRESS NOTES
[facility]  Cardiology  Progress Note    Admission date:  3/24/2022    Reason for follow up visit: STEMI    HPI/CC: Shiva Rivera is a 56 y.o. male who presented 3/24/2022 for chest pain and found to have inferior STEMI. LHC showed 100% RCA treated with DANIA. Noted to have LM disease, CT surgery consulted, plan for CABG 3/29/2022. Echo showed EF 40-45%. Rhythm has been sinus. Subjective: Feels good today. Denies chest pain, shortness of breath, palpitations and dizziness. Vitals:  Blood pressure 106/62, pulse 73, temperature 98.7 °F (37.1 °C), temperature source Oral, resp. rate 16, height 5' 11\" (1.803 m), weight 191 lb 12.8 oz (87 kg), SpO2 92 %.   Temp  Av.4 °F (36.9 °C)  Min: 98 °F (36.7 °C)  Max: 98.7 °F (37.1 °C)  Pulse  Av.9  Min: 65  Max: 84  BP  Min: 95/52  Max: 112/50  SpO2  Av.3 %  Min: 91 %  Max: 94 %    24 hour I/O    Intake/Output Summary (Last 24 hours) at 3/26/2022 1222  Last data filed at 3/26/2022 1100  Gross per 24 hour   Intake 716.42 ml   Output 200 ml   Net 516.42 ml     Current Facility-Administered Medications   Medication Dose Route Frequency Provider Last Rate Last Admin    eptifibatide (INTEGRILIN) 0.75 mg/mL infusion  2 mcg/kg/min (Order-Specific) IntraVENous Continuous Doc Estrada MD 13.4 mL/hr at 22 1100 2 mcg/kg/min at 22 1100    sodium chloride flush 0.9 % injection 5-40 mL  5-40 mL IntraVENous 2 times per day Bela Blevins MD   10 mL at 22 0948    sodium chloride flush 0.9 % injection 5-40 mL  5-40 mL IntraVENous PRN Bela Blevins MD        carvedilol (COREG) tablet 3.125 mg  3.125 mg Oral BID WC Bela Blevins MD   3.125 mg at 22 1655    lisinopril (PRINIVIL;ZESTRIL) tablet 5 mg  5 mg Oral Daily Bela Blevins MD   5 mg at 22 9374    aspirin chewable tablet 81 mg  81 mg Oral Daily Bela Blevins MD   81 mg at 22 0947    perflutren lipid microspheres (DEFINITY) injection 1.65 mg  1.5 mL IntraVENous ONCE PRN Gurjit Santamaria MD        0.9 % sodium chloride infusion  25 mL IntraVENous PRN Gurjit Santamaria MD        ondansetron (ZOFRAN-ODT) disintegrating tablet 4 mg  4 mg Oral Q8H PRN Gurjit Santamaria MD        Or    ondansetron [facility]) injection 4 mg  4 mg IntraVENous Q6H PRN Gurjit Santamaria MD        acetaminophen (TYLENOL) tablet 650 mg  650 mg Oral Q6H PRN Gurjit Santamaria MD   650 mg at 03/26/22 0425    Or    acetaminophen (TYLENOL) suppository 650 mg  650 mg Rectal Q6H PRN Gurjit Santamaria MD        polyethylene glycol [facility]) packet 17 g  17 g Oral Daily PRN Gurjit Santamaria MD        atorvastatin (LIPITOR) tablet 80 mg  80 mg Oral Nightly Gurjit Santamaria MD   80 mg at 03/25/22 2058    enoxaparin (LOVENOX) injection 40 mg  40 mg SubCUTAneous Daily Gurjit Santamaria MD        morphine (PF) injection 2 mg  2 mg IntraVENous Q4H PRN Gurjit Santamaria MD        Or   [address] [place] [address] morphine sulfate (PF) injection 4 mg  4 mg IntraVENous Q4H PRN Gurjit Santamaria MD        mupirocin [facility]) 2 % ointment   Nasal BID Gurjit Santamaria MD   Given at 03/26/22 7240    therapeutic multivitamin-minerals 1 tablet  1 tablet Oral Daily Diantha Estimable, APRN - CNP   1 tablet at 03/26/22 3853     Review of Systems   Constitutional: Positive for fatigue. Respiratory: Positive for chest tightness. Negative for shortness of breath. Cardiovascular: Negative for chest pain. Neurological: Negative.       Objective:     Telemetry monitor: SR    Physical Exam:  Constitutional:  Comfortable and alert, NAD, appears stated age  Eyes: PERRL, sclera nonicteric  Neck:  Supple, no masses, no thyroidmegaly, no JVD  Skin:  Warm and dry; no rash or lesions  Heart: Regular, normal apex, S1 and S2 normal, no M/G/R  Lungs:  Normal respiratory effort; clear; no wheezing/rhonchi/rales  Abdomen: soft, non tender, + bowel sounds  Extremities:  No edema or cyanosis; no clubbing  Neuro: alert and oriented, moves legs and arms equally, normal mood and affect  Right radial site soft, no hematoma, 2+ pulse    Data Reviewed:    Echo 3/24/2022:  Normal left ventricle size, wall thickness, and systolic function with an   estimated ejection fraction of 40-45%. Mild hypokinesis of the inferior, inferoseptal wall segments. Normal left ventricular diastolic filling pressures. The right ventricle is normal in size and function. Trace mitral and tricuspid valve regurgitation. Systolic pulmonary artery pressure (sPAP) is normal and estimated at 23 mmHg   (right atrial pressure 3 mmHg)   Definity® used for myocardial border enhancement. Coronary angiogram 3/24/2022:  Acute inferior STEMI  PROCEDURES PERFORMED    Left heart catheterization  LVgram  Coronary angiogam  Coronary cath  Monitoring of moderate conscious sedation   Mechanical thrombectomy of RCA  IVUS of RCA  PCI of RCA with single drug-eluting sten  PROCEDURE DESCRIPTION    This was felt to be an emergency procedure. Patient was prepped draped in the usual sterile fashion. Local anaesthetic was applied over puncture site. Using a back wall technique, a 6 [place] Terumo sheath was inserted into right radial artery. Verapamil, nitroglycerin, nicardipine were administered through the sheath. Heparin was administered. Diagnostic 5 Malaysian pigtail, Ultram catheters were used for diagnostic angiograms. At the conclusion of the procedure, a TR band was placed over the puncture site and hemostasis was obtained. There were no immediate complications. I supervised sedation from 12:17 PM to 1:08 PM with versed 6 mg/fentanyl 200 mcg during the procedure. An independent trained observer pushed meds at my direction. We monitored the patient's level of consciousness and vital signs/physiologic status throughout the procedure duration (see times listed previously). 130 cc contrast was utilized. <20cc EBL.   FINDINGS     LVEDP  17   GRADIENT ACROSS AORTIC VALVE  none   LV FUNCTION EF 35%   WALL MOTION  inferior hypokinesis   MITRAL REGURGITATION  mild     LM  Less than 10% proximal stenosis, mid 20-30% stenosis. There is distal 70% stenosis.       LAD  Proximal 20% stenosis, mid 60 to 70% stenosis, distal less than 10% stenosis.     D1 has 20% kfmgebvi-inv-ftiqan stenosis.       LCX  Less than 10% wrcxszdj-fwr-ymftdi stenosis.       RI  Difficult to fully visualize on study, is a small to medium size vessel, there does not appear to be severe obstruction, there does appear to be less than 10% haycwkox-ilw-cwfqra stenosis.       RCA Dominant, large vessel, proximal-mid 100% occlusion, distal vessel was ultimately visualized on completion angiography is less than 10% stenosis. There is thrombus present in the mid vessel. PERCUTANEOUS INTERVENTION DESCRIPTION    Heparin was used for any coagulation, patient been preloaded with Brilinta, Integrilin was given during the procedure. Initially a 6 [employer] 5 guiding catheter used to intubate the RCA. A [employer] wire was used to cross the lesion. Mechanical thrombectomy was performed with the penumbra device. Flow was restored to this. IVUS was performed which showed thrombus/dissected plaque in the mid vessel and this was then treated with stenting with Medtronic resolute [place] 4 x 38 mm drug-eluting stent. Stent was postdilated with 4.5 and 5 mm noncompliant balloons. Follow-up IVUS showed good stent apposition/expansion. There was no residual dissection or thrombus noted. CONCLUSIONS:    Severe left main disease  Successful PCI of RCA with single drug-eluting stent   Continue Integrilin indefinitely, will consult with CT surgery, case discussed with Dr. Reggie Abel in Cath Lab, plan for CABG at some point in the future.     Lab Reviewed:     Renal Profile:  Lab Results   Component Value Date    CREATININE 0.9 03/26/2022    BUN 18 03/26/2022     03/26/2022    K 4.0 03/26/2022     03/26/2022    CO2 25 03/26/2022 CBC:    Lab Results   Component Value Date    WBC 8.6 03/26/2022    RBC 3.83 03/26/2022    HGB 12.2 03/26/2022    HCT 36.3 03/26/2022    MCV 94.7 03/26/2022    RDW 13.1 03/26/2022     03/26/2022     BNP:  No results found for: PROBNP  Fasting Lipid Panel:  No results found for: CHOL, HDL, TRIG  Cardiac Enzymes:  CK/MbTroponin  Lab Results   Component Value Date    TROPONINI 4.33 03/25/2022     PT/ INR No results found for: INR, PROTIME  PTT No results found for: PTT No results found for: MG   Lab Results   Component Value Date    TSH 1.54 03/25/2022     All labs and imaging reviewed today    Assessment:  STEMI/CAD: angina improved, s/p thrombectomy and DANIA RCA 3/24/2022   - CABG planned for 3/29/2022 for residual CAD/severe LM  Ischemic cardiomyopathy: EF 40-45%  Tobacco abuse: counseled    Plan:   1. Intergrilin gtt until CABG 3/29/2022, brilinta washout, last dose 3/24/2022  2. Continue aspirin, statin, carvedilol, lisinopril  3. Check lipids  4. Activity restrictions reviewed  5. Cardiology will follow up post CABG on 3/30/2022. Please call if needed in interim.      Maral Prather, APRN-CNP  [facility]  [phone]

## 2022-03-26 NOTE — PROGRESS NOTES
Hospitalist Progress Note      PCP: No primary care provider on file. Date of Admission: 3/24/2022    Chief Complaint: Chest pain    Hospital Course:     Subjective: Patient is sitting in a chair denies any chest pain or shortness of breath wife at the bedside. Medications:  Reviewed    Infusion Medications    eptifibatide 2 mcg/kg/min (03/26/22 0429)    sodium chloride       Scheduled Medications    sodium chloride flush  5-40 mL IntraVENous 2 times per day    carvedilol  3.125 mg Oral BID WC    lisinopril  5 mg Oral Daily    aspirin  81 mg Oral Daily    atorvastatin  80 mg Oral Nightly    enoxaparin  40 mg SubCUTAneous Daily    mupirocin   Nasal BID    multivitamin  1 tablet Oral Daily     PRN Meds: sodium chloride flush, perflutren lipid microspheres, sodium chloride, ondansetron **OR** ondansetron, acetaminophen **OR** acetaminophen, polyethylene glycol, morphine **OR** morphine      Intake/Output Summary (Last 24 hours) at 3/26/2022 0803  Last data filed at 3/26/2022 0438  Gross per 24 hour   Intake 880.96 ml   Output 200 ml   Net 680.96 ml       Physical Exam Performed:    BP (!) 102/57   Pulse 65   Temp 98.5 °F (36.9 °C)   Resp 18   Ht 5' 11\" (1.803 m)   Wt 191 lb 12.8 oz (87 kg)   SpO2 92%   BMI 26.75 kg/m²     General appearance: No apparent distress, appears stated age and cooperative. HEENT: Pupils equal, round, and reactive to light. Conjunctivae/corneas clear. Neck: Supple, with full range of motion. No jugular venous distention. Trachea midline. Respiratory:  Normal respiratory effort. Clear to auscultation, bilaterally without Rales/Wheezes/Rhonchi. Cardiovascular: Regular rate and rhythm with normal S1/S2 without murmurs, rubs or gallops. Abdomen: Soft, non-tender, non-distended with normal bowel sounds. Musculoskeletal: No clubbing, cyanosis or edema bilaterally. Full range of motion without deformity.   Skin: Skin color, texture, turgor normal.  No rashes or lesions. Neurologic:  Neurovascularly intact without any focal sensory/motor deficits. Cranial nerves: II-XII intact, grossly non-focal.  Psychiatric: Alert and oriented, thought content appropriate, normal insight  Capillary Refill: Brisk,3 seconds, normal   Peripheral Pulses: +2 palpable, equal bilaterally       Labs:   Recent Labs     03/24/22  1240 03/25/22  0500 03/26/22  0417   WBC 11.0 9.0 8.6   HGB 12.2* 12.3* 12.2*   HCT 36.3* 36.9* 36.3*    198 196     Recent Labs     03/24/22  1240 03/25/22  0421 03/26/22  0417   * 137 138   K 3.5 4.3 4.0    106 105   CO2 22 20* 25   BUN 16 10 18   CREATININE 0.8* 0.8* 0.9   CALCIUM 8.4 8.6 9.3     Recent Labs     03/24/22  1240   AST 20   ALT 19   BILITOT 0.7   ALKPHOS 121     No results for input(s): INR in the last 72 hours. Recent Labs     03/24/22  1240 03/24/22  1513 03/25/22  0421   TROPONINI <0.01 6.01* 4.33*       Urinalysis:      Lab Results   Component Value Date    NITRU Negative 03/24/2022    WBCUA 0-2 03/24/2022    RBCUA 0-2 03/24/2022    BLOODU MODERATE 03/24/2022    SPECGRAV 1.010 03/24/2022    GLUCOSEU Negative 03/24/2022       Radiology:  VL DUP CAROTID BILATERAL   Final Result      VL PRE OP VEIN MAPPING   Final Result      XR CHEST PORTABLE   Final Result   No acute cardiopulmonary disease. Assessment/Plan:    Active Hospital Problems    Diagnosis     CAD in native artery [I25.10]     STEMI (ST elevation myocardial infarction) ([facility] [address] [I21.3]      1. This is a 74-year-old male admitted with a STEMI status post PCI with a DANIA to RCA, severe LM disease cardiology and CT surgery consulted and following. Plan for CABG on 3/29/2022. DVT Prophylaxis: Lovenox subcu  Diet: ADULT DIET;  Regular  Code Status: Full Code    PT/OT Eval Status:     Lizbeth Villafana MD

## 2022-03-27 LAB
ANION GAP SERPL CALCULATED.3IONS-SCNC: 11 MMOL/L (ref 3–16)
BUN BLDV-MCNC: 16 MG/DL (ref 7–20)
CALCIUM SERPL-MCNC: 8.5 MG/DL (ref 8.3–10.6)
CHLORIDE BLD-SCNC: 105 MMOL/L (ref 99–110)
CO2: 25 MMOL/L (ref 21–32)
CREAT SERPL-MCNC: 0.8 MG/DL (ref 0.9–1.3)
GFR AFRICAN AMERICAN: >60
GFR NON-AFRICAN AMERICAN: >60
GLUCOSE BLD-MCNC: 112 MG/DL (ref 70–99)
POTASSIUM REFLEX MAGNESIUM: 3.8 MMOL/L (ref 3.5–5.1)
SODIUM BLD-SCNC: 141 MMOL/L (ref 136–145)

## 2022-03-27 PROCEDURE — 6360000002 HC RX W HCPCS: Performed by: INTERNAL MEDICINE

## 2022-03-27 PROCEDURE — 6370000000 HC RX 637 (ALT 250 FOR IP): Performed by: INTERNAL MEDICINE

## 2022-03-27 PROCEDURE — 80048 BASIC METABOLIC PNL TOTAL CA: CPT

## 2022-03-27 PROCEDURE — 36415 COLL VENOUS BLD VENIPUNCTURE: CPT

## 2022-03-27 PROCEDURE — 2000000000 HC ICU R&B

## 2022-03-27 PROCEDURE — 6370000000 HC RX 637 (ALT 250 FOR IP): Performed by: NURSE PRACTITIONER

## 2022-03-27 PROCEDURE — 2580000003 HC RX 258: Performed by: INTERNAL MEDICINE

## 2022-03-27 RX ORDER — PANTOPRAZOLE SODIUM 40 MG/1
40 TABLET, DELAYED RELEASE ORAL
Status: DISCONTINUED | OUTPATIENT
Start: 2022-03-28 | End: 2022-03-29

## 2022-03-27 RX ADMIN — EPTIFIBATIDE 2 MCG/KG/MIN: 0.75 INJECTION INTRAVENOUS at 08:52

## 2022-03-27 RX ADMIN — EPTIFIBATIDE 2 MCG/KG/MIN: 0.75 INJECTION INTRAVENOUS at 17:09

## 2022-03-27 RX ADMIN — ATORVASTATIN CALCIUM 80 MG: 80 TABLET, FILM COATED ORAL at 19:41

## 2022-03-27 RX ADMIN — CARVEDILOL 3.12 MG: 3.12 TABLET, FILM COATED ORAL at 17:15

## 2022-03-27 RX ADMIN — ACETAMINOPHEN 650 MG: 325 TABLET ORAL at 04:41

## 2022-03-27 RX ADMIN — EPTIFIBATIDE 2 MCG/KG/MIN: 0.75 INJECTION INTRAVENOUS at 23:51

## 2022-03-27 RX ADMIN — SODIUM CHLORIDE, PRESERVATIVE FREE 10 ML: 5 INJECTION INTRAVENOUS at 19:42

## 2022-03-27 RX ADMIN — Medication 1 TABLET: at 08:24

## 2022-03-27 RX ADMIN — SODIUM CHLORIDE, PRESERVATIVE FREE 10 ML: 5 INJECTION INTRAVENOUS at 08:25

## 2022-03-27 RX ADMIN — MUPIROCIN: 20 OINTMENT TOPICAL at 19:42

## 2022-03-27 RX ADMIN — MUPIROCIN: 20 OINTMENT TOPICAL at 08:26

## 2022-03-27 RX ADMIN — LISINOPRIL 5 MG: 5 TABLET ORAL at 08:24

## 2022-03-27 RX ADMIN — CARVEDILOL 3.12 MG: 3.12 TABLET, FILM COATED ORAL at 08:24

## 2022-03-27 RX ADMIN — EPTIFIBATIDE 2 MCG/KG/MIN: 0.75 INJECTION INTRAVENOUS at 01:40

## 2022-03-27 RX ADMIN — ASPIRIN 81 MG 81 MG: 81 TABLET ORAL at 08:25

## 2022-03-27 ASSESSMENT — PAIN SCALES - GENERAL
PAINLEVEL_OUTOF10: 1
PAINLEVEL_OUTOF10: 0

## 2022-03-27 NOTE — PROGRESS NOTES
Hospitalist Progress Note      PCP: No primary care provider on file. Date of Admission: 3/24/2022    Chief Complaint: Chest pain    Hospital Course:     Subjective: Patient denies any chest pain or shortness of breath no nausea vomiting wife at the bedside. Medications:  Reviewed    Infusion Medications    eptifibatide 2 mcg/kg/min (03/27/22 0140)    sodium chloride       Scheduled Medications    sodium chloride flush  5-40 mL IntraVENous 2 times per day    carvedilol  3.125 mg Oral BID WC    lisinopril  5 mg Oral Daily    aspirin  81 mg Oral Daily    atorvastatin  80 mg Oral Nightly    enoxaparin  40 mg SubCUTAneous Daily    mupirocin   Nasal BID    multivitamin  1 tablet Oral Daily     PRN Meds: sodium chloride flush, perflutren lipid microspheres, sodium chloride, ondansetron **OR** ondansetron, acetaminophen **OR** acetaminophen, polyethylene glycol, morphine **OR** morphine      Intake/Output Summary (Last 24 hours) at 3/27/2022 0715  Last data filed at 3/27/2022 0435  Gross per 24 hour   Intake 522.06 ml   Output --   Net 522.06 ml       Physical Exam Performed:    BP (!) 109/53   Pulse 64   Temp 98 °F (36.7 °C) (Oral)   Resp 16   Ht 5' 11\" (1.803 m)   Wt 189 lb 9.6 oz (86 kg)   SpO2 92%   BMI 26.44 kg/m²     General appearance: No apparent distress, appears stated age and cooperative. HEENT: Pupils equal, round, and reactive to light. Conjunctivae/corneas clear. Neck: Supple, with full range of motion. No jugular venous distention. Trachea midline. Respiratory:  Normal respiratory effort. Clear to auscultation, bilaterally without Rales/Wheezes/Rhonchi. Cardiovascular: Regular rate and rhythm with normal S1/S2 without murmurs, rubs or gallops. Abdomen: Soft, non-tender, non-distended with normal bowel sounds. Musculoskeletal: No clubbing, cyanosis or edema bilaterally. Full range of motion without deformity.   Skin: Skin color, texture, turgor normal.  No rashes or

## 2022-03-28 ENCOUNTER — ANESTHESIA EVENT (OUTPATIENT)
Dept: OPERATING ROOM | Age: 57
DRG: 232 | End: 2022-03-28
Payer: COMMERCIAL

## 2022-03-28 LAB
ABO/RH: NORMAL
ANION GAP SERPL CALCULATED.3IONS-SCNC: 12 MMOL/L (ref 3–16)
ANTIBODY SCREEN: NORMAL
BUN BLDV-MCNC: 19 MG/DL (ref 7–20)
CALCIUM SERPL-MCNC: 9 MG/DL (ref 8.3–10.6)
CHLORIDE BLD-SCNC: 101 MMOL/L (ref 99–110)
CO2: 23 MMOL/L (ref 21–32)
CREAT SERPL-MCNC: 0.8 MG/DL (ref 0.9–1.3)
GFR AFRICAN AMERICAN: >60
GFR NON-AFRICAN AMERICAN: >60
GLUCOSE BLD-MCNC: 101 MG/DL (ref 70–99)
POTASSIUM REFLEX MAGNESIUM: 3.9 MMOL/L (ref 3.5–5.1)
SODIUM BLD-SCNC: 136 MMOL/L (ref 136–145)

## 2022-03-28 PROCEDURE — 6360000002 HC RX W HCPCS: Performed by: THORACIC SURGERY (CARDIOTHORACIC VASCULAR SURGERY)

## 2022-03-28 PROCEDURE — 2000000000 HC ICU R&B

## 2022-03-28 PROCEDURE — 36415 COLL VENOUS BLD VENIPUNCTURE: CPT

## 2022-03-28 PROCEDURE — 6370000000 HC RX 637 (ALT 250 FOR IP): Performed by: INTERNAL MEDICINE

## 2022-03-28 PROCEDURE — 6370000000 HC RX 637 (ALT 250 FOR IP): Performed by: NURSE PRACTITIONER

## 2022-03-28 PROCEDURE — 6370000000 HC RX 637 (ALT 250 FOR IP): Performed by: HOSPITALIST

## 2022-03-28 PROCEDURE — 86923 COMPATIBILITY TEST ELECTRIC: CPT

## 2022-03-28 PROCEDURE — 6370000000 HC RX 637 (ALT 250 FOR IP)

## 2022-03-28 PROCEDURE — 86900 BLOOD TYPING SEROLOGIC ABO: CPT

## 2022-03-28 PROCEDURE — 80048 BASIC METABOLIC PNL TOTAL CA: CPT

## 2022-03-28 PROCEDURE — 86901 BLOOD TYPING SEROLOGIC RH(D): CPT

## 2022-03-28 PROCEDURE — 2580000003 HC RX 258: Performed by: INTERNAL MEDICINE

## 2022-03-28 PROCEDURE — 86850 RBC ANTIBODY SCREEN: CPT

## 2022-03-28 PROCEDURE — P9035 PLATELET PHERES LEUKOREDUCED: HCPCS

## 2022-03-28 PROCEDURE — 6360000002 HC RX W HCPCS: Performed by: INTERNAL MEDICINE

## 2022-03-28 RX ORDER — CHLORHEXIDINE GLUCONATE 4 G/100ML
SOLUTION TOPICAL
Status: COMPLETED
Start: 2022-03-28 | End: 2022-03-28

## 2022-03-28 RX ORDER — CHLORHEXIDINE GLUCONATE 4 G/100ML
SOLUTION TOPICAL SEE ADMIN INSTRUCTIONS
Status: DISCONTINUED | OUTPATIENT
Start: 2022-03-28 | End: 2022-03-29

## 2022-03-28 RX ORDER — SODIUM CHLORIDE, SODIUM LACTATE, POTASSIUM CHLORIDE, CALCIUM CHLORIDE 600; 310; 30; 20 MG/100ML; MG/100ML; MG/100ML; MG/100ML
INJECTION, SOLUTION INTRAVENOUS CONTINUOUS
Status: DISCONTINUED | OUTPATIENT
Start: 2022-03-29 | End: 2022-03-29

## 2022-03-28 RX ORDER — EPTIFIBATIDE 0.75 MG/ML
2 INJECTION, SOLUTION INTRAVENOUS CONTINUOUS
Status: DISPENSED | OUTPATIENT
Start: 2022-03-28 | End: 2022-03-29

## 2022-03-28 RX ORDER — SODIUM CHLORIDE 9 MG/ML
INJECTION, SOLUTION INTRAVENOUS PRN
Status: DISCONTINUED | OUTPATIENT
Start: 2022-03-28 | End: 2022-03-29

## 2022-03-28 RX ORDER — LORAZEPAM 0.5 MG/1
0.5 TABLET ORAL ONCE
Status: DISCONTINUED | OUTPATIENT
Start: 2022-03-28 | End: 2022-03-28

## 2022-03-28 RX ORDER — ASPIRIN 81 MG/1
81 TABLET ORAL ONCE
Status: COMPLETED | OUTPATIENT
Start: 2022-03-29 | End: 2022-03-29

## 2022-03-28 RX ORDER — LORAZEPAM 1 MG/1
1 TABLET ORAL ONCE
Status: COMPLETED | OUTPATIENT
Start: 2022-03-28 | End: 2022-03-28

## 2022-03-28 RX ORDER — CHLORHEXIDINE GLUCONATE 0.12 MG/ML
15 RINSE ORAL ONCE
Status: COMPLETED | OUTPATIENT
Start: 2022-03-28 | End: 2022-03-29

## 2022-03-28 RX ORDER — BISACODYL 10 MG
10 SUPPOSITORY, RECTAL RECTAL ONCE
Status: DISCONTINUED | OUTPATIENT
Start: 2022-03-28 | End: 2022-04-02 | Stop reason: HOSPADM

## 2022-03-28 RX ADMIN — Medication 1 TABLET: at 07:52

## 2022-03-28 RX ADMIN — ANTISEPTIC SURGICAL SCRUB: 0.04 SOLUTION TOPICAL at 21:15

## 2022-03-28 RX ADMIN — MUPIROCIN: 20 OINTMENT TOPICAL at 07:53

## 2022-03-28 RX ADMIN — SODIUM CHLORIDE, PRESERVATIVE FREE 10 ML: 5 INJECTION INTRAVENOUS at 07:53

## 2022-03-28 RX ADMIN — MUPIROCIN: 20 OINTMENT TOPICAL at 21:15

## 2022-03-28 RX ADMIN — LORAZEPAM 1 MG: 1 TABLET ORAL at 21:15

## 2022-03-28 RX ADMIN — ASPIRIN 81 MG 81 MG: 81 TABLET ORAL at 07:52

## 2022-03-28 RX ADMIN — PANTOPRAZOLE SODIUM 40 MG: 40 TABLET, DELAYED RELEASE ORAL at 05:50

## 2022-03-28 RX ADMIN — EPTIFIBATIDE 2 MCG/KG/MIN: 0.75 INJECTION INTRAVENOUS at 05:51

## 2022-03-28 RX ADMIN — CARVEDILOL 3.12 MG: 3.12 TABLET, FILM COATED ORAL at 07:52

## 2022-03-28 RX ADMIN — SODIUM CHLORIDE, PRESERVATIVE FREE 10 ML: 5 INJECTION INTRAVENOUS at 21:15

## 2022-03-28 RX ADMIN — EPTIFIBATIDE 2 MCG/KG/MIN: 0.75 INJECTION INTRAVENOUS at 07:49

## 2022-03-28 RX ADMIN — EPTIFIBATIDE 2 MCG/KG/MIN: 0.75 INJECTION INTRAVENOUS at 13:10

## 2022-03-28 RX ADMIN — LISINOPRIL 5 MG: 5 TABLET ORAL at 07:52

## 2022-03-28 RX ADMIN — POLYETHYLENE GLYCOL 3350 17 G: 17 POWDER, FOR SOLUTION ORAL at 16:38

## 2022-03-28 RX ADMIN — EPTIFIBATIDE 2 MCG/KG/MIN: 0.75 INJECTION INTRAVENOUS at 20:54

## 2022-03-28 RX ADMIN — ATORVASTATIN CALCIUM 80 MG: 80 TABLET, FILM COATED ORAL at 21:15

## 2022-03-28 RX ADMIN — CARVEDILOL 3.12 MG: 3.12 TABLET, FILM COATED ORAL at 16:38

## 2022-03-28 RX ADMIN — ENOXAPARIN SODIUM 40 MG: 40 INJECTION SUBCUTANEOUS at 07:52

## 2022-03-28 ASSESSMENT — PAIN SCALES - GENERAL
PAINLEVEL_OUTOF10: 0

## 2022-03-28 ASSESSMENT — LIFESTYLE VARIABLES: SMOKING_STATUS: 1

## 2022-03-28 NOTE — ANESTHESIA PRE PROCEDURE
Department of Anesthesiology  Preprocedure Note       Name:  Winnie Daniel   Age:  56 y.o.  :  1965                                          MRN:  [id number]         Date:  3/28/2022      Surgeon: Renan Esparza):  Catrina Dunlap MD    Procedure: Procedure(s):  CORONARY ARTERY BYPASS GRAFT TIMES TWO, LEFT ATRIAL APPENDAGE CLIP    Medications prior to admission:   Prior to Admission medications    Not on File       Current medications:    Current Facility-Administered Medications   Medication Dose Route Frequency Provider Last Rate Last Admin    eptifibatide (INTEGRILIN) 0.75 mg/mL infusion  2 mcg/kg/min (Order-Specific) IntraVENous Continuous Tatianna Webber MD 13.4 mL/hr at 22 1310 2 mcg/kg/min at 22 1310    [START ON 3/29/2022] lactated ringers infusion   IntraVENous Continuous Rika Nye Pipe, APRN - CNP        [START ON 3/29/2022] aspirin EC tablet 81 mg  81 mg Oral Once Natanael Fling, APRN - CNP        bisacodyl (DULCOLAX) suppository 10 mg  10 mg Rectal Once Natanael Fling, APRN - CNP        LORazepam (ATIVAN) tablet 1 mg  1 mg Oral Once Natanael Fling, APRN - CNP        0.9 % sodium chloride infusion   IntraVENous PRN Tatianna Webber MD        pantoprazole (PROTONIX) tablet 40 mg  40 mg Oral QAM AC Ernestine Oswald MD   40 mg at 22 0550    sodium chloride flush 0.9 % injection 5-40 mL  5-40 mL IntraVENous 2 times per day Jon Burgess MD   10 mL at 22 0753    sodium chloride flush 0.9 % injection 5-40 mL  5-40 mL IntraVENous PRN Jon Burgess MD        carvedilol (COREG) tablet 3.125 mg  3.125 mg Oral BID WC Jon Burgess MD   3.125 mg at 22 1638    lisinopril (PRINIVIL;ZESTRIL) tablet 5 mg  5 mg Oral Daily Jon Burgess MD   5 mg at 22 3824    aspirin chewable tablet 81 mg  81 mg Oral Daily Jon Burgess MD   81 mg at 22 2960    perflutren lipid microspheres (DEFINITY) injection 1.65 mg  1.5 mL IntraVENous ONCE PRN Sabi Moyer MD        0.9 % sodium chloride infusion  25 mL IntraVENous PRN Sabi Moyer MD        ondansetron (ZOFRAN-ODT) disintegrating tablet 4 mg  4 mg Oral Q8H PRN Sabi Moyer MD        Or    ondansetron [facility]) injection 4 mg  4 mg IntraVENous Q6H PRN Sabi Moyer MD        acetaminophen (TYLENOL) tablet 650 mg  650 mg Oral Q6H PRN Sabi Moyer MD   650 mg at 03/27/22 0441    Or    acetaminophen (TYLENOL) suppository 650 mg  650 mg Rectal Q6H PRN Sabi Moyer MD        polyethylene glycol [facility]) packet 17 g  17 g Oral Daily PRN Sabi Moyer MD   17 g at 03/28/22 1638    atorvastatin (LIPITOR) tablet 80 mg  80 mg Oral Nightly Sabi Moyer MD   80 mg at 03/27/22 1941    enoxaparin (LOVENOX) injection 40 mg  40 mg SubCUTAneous Daily Sabi Moyer MD   40 mg at 03/28/22 0752    morphine (PF) injection 2 mg  2 mg IntraVENous Q4H PRN Sabi Moyer MD        Or   [facility] morphine sulfate (PF) injection 4 mg  4 mg IntraVENous Q4H PRN Sabi Moyer MD        mupirocin [facility]) 2 % ointment   Nasal BID Sabi Moyer MD   Given at 03/28/22 5849    therapeutic multivitamin-minerals 1 tablet  1 tablet Oral Daily [address], APRN - CNP   1 tablet at 03/28/22 2613       Allergies:  No Known Allergies    Problem List:    Patient Active Problem List   Diagnosis Code    Acute myocardial infarction ([facility] [address] I21.9    CAD in native artery I25.10    STEMI (ST elevation myocardial infarction) ([facility] [address] I21.3       Past Medical History:  History reviewed. No pertinent past medical history. Past Surgical History:  History reviewed. No pertinent surgical history.     Social History:    Social History     Tobacco Use    Smoking status: Current Every Day Smoker    Smokeless tobacco: Not on file    Tobacco comment: Unknown amount, unknown how long   Substance Use Topics    Alcohol use: Not on file                                Ready to quit: Not Answered  Counseling given: Not Answered  Comment: Unknown amount, unknown how long      Vital Signs (Current):   Vitals:    03/28/22 0752 03/28/22 1201 03/28/22 1638 03/28/22 1641   BP: 118/72 110/65 (!) 113/59 (!) 113/59   Pulse: 73 64 72 76   Resp: 18 18  18   Temp: 98.3 °F (36.8 °C) 98.4 °F (36.9 °C)  98.6 °F (37 °C)   TempSrc: Oral Oral  Oral   SpO2: 94% 96%  94%   Weight:       Height:                                                  BP Readings from Last 3 Encounters:   03/28/22 (!) 113/59       NPO Status:                                                                                 BMI:   Wt Readings from Last 3 Encounters:   03/28/22 189 lb 6.4 oz (85.9 kg)     Body mass index is 26.42 kg/m². CBC:   Lab Results   Component Value Date    WBC 8.6 03/26/2022    RBC 3.83 03/26/2022    HGB 12.2 03/26/2022    HCT 36.3 03/26/2022    MCV 94.7 03/26/2022    RDW 13.1 03/26/2022     03/26/2022       CMP:   Lab Results   Component Value Date     03/28/2022    K 3.9 03/28/2022     03/28/2022    CO2 23 03/28/2022    BUN 19 03/28/2022    CREATININE 0.8 03/28/2022    GFRAA >60 03/28/2022    AGRATIO 1.5 03/24/2022    LABGLOM >60 03/28/2022    GLUCOSE 101 03/28/2022    PROT 6.8 03/24/2022    CALCIUM 9.0 03/28/2022    BILITOT 0.7 03/24/2022    ALKPHOS 121 03/24/2022    AST 20 03/24/2022    ALT 19 03/24/2022       POC Tests: No results for input(s): POCGLU, POCNA, POCK, POCCL, POCBUN, POCHEMO, POCHCT in the last 72 hours.     Coags: No results found for: PROTIME, INR, APTT    HCG (If Applicable): No results found for: PREGTESTUR, PREGSERUM, HCG, HCGQUANT     ABGs: No results found for: PHART, PO2ART, JOA6UXK, NUV1AYQ, BEART, Q5BPGKHF     Type & Screen (If Applicable):  No results found for: LABABO, LABRH    Drug/Infectious Status (If Applicable):  No results found for: HIV, HEPCAB    COVID-19 Screening (If Applicable): No results found for: COVID19        Anesthesia Evaluation    Airway: Mallampati: I  TM distance: >3 FB   Neck ROM: full  Mouth opening: > = 3 FB Dental:          Pulmonary:   (+) current smoker                           Cardiovascular:    (+) past MI: < 1 month, CAD: obstructive,                   Neuro/Psych:               GI/Hepatic/Renal:             Endo/Other:                     Abdominal:             Vascular: Other Findings:             Anesthesia Plan      general     ASA 4     (  [facility] EVALUATION FORM       Name:  Immanuel Menard                                         Age:  56 y.o. MRN:  [id number]           I discussed intravenous with inhalational endotracheal anesthesia with pulmonary artery catheter, radial ( or other artery if required) catheter, possible transesophageal echocardiography and post-operative ventilation including risks and alternatives with the patient . The patient has no further questions. Loida Collins MD  March 28, 2022  7:38 PM)  Induction: inhalational and intravenous. arterial line, central line, continuous noninvasive hemodynamic monitor, BIS, CVP and URSZULA    Anesthetic plan and risks discussed with patient.                       Loida Collins MD   3/28/2022

## 2022-03-28 NOTE — PROGRESS NOTES
Pt refused to get labs drawn this AM.     Pt very anxious about needles. Educated pt the importance of getting labs drawn. Provided active listening and answered questions to his concerns. Pt is agreeable to get labs drawn but would like a different tech. Notified lab and they will send someone else.

## 2022-03-28 NOTE — PROGRESS NOTES
CVTS Thoracic Progress Note:          CC:  Severe multivessel CAD, inferior STEMI     Subj: Denies chest pain or shortness of breath. Anxious regarding surgery tomorrow. Obj:    Blood pressure 118/72, pulse 73, temperature 98.3 °F (36.8 °C), temperature source Oral, resp. rate 18, height 5' 11\" (1.803 m), weight 189 lb 6.4 oz (85.9 kg), SpO2 94 %. Alert, oriented    S1 S2 normal. SR on monitor     Lungs ctab    Abdomen soft, non-tender. Normoactive bowel sounds    No lower extremity edema     Diagnostics:   Recent Labs     03/26/22 0417   WBC 8.6   HGB 12.2*   HCT 36.3*                                                                     Recent Labs     03/26/22  0417 03/27/22  0419 03/28/22  0503    141 136   K 4.0 3.8 3.9    105 101   CO2 25 25 23   BUN 18 16 19   CREATININE 0.9 0.8* 0.8*   GLUCOSE 111* 112* 101*     CXR: 3/24/22  FINDINGS:   The cardiomediastinal silhouette is unremarkable.  The lungs are clear.  No   infiltrate, pleural fluid or evidence of overt failure.           Impression   No acute cardiopulmonary disease.         Vein mapping: 3/24/22  LE Vein Mapping       +----------------------------------++--------+-----+----+--------+-----+   ! Superficial - Great Saphenous Vein! !Right   !     !Left!        !     !   +----------------------------------++--------+-----+----+--------+-----+   ! Location                          ! ! Diameter! Depth!    !Diameter! Depth!   +----------------------------------++--------+-----+----+--------+-----+   ! Sapheno Femoral Junction          !!3.8     !     !    !4.07    !     !   +----------------------------------++--------+-----+----+--------+-----+   ! GSV High Thigh                    !!3.66    !     !    !3. 4     !     !   +----------------------------------++--------+-----+----+--------+-----+   ! GSV Mid Thigh                     !!3.77    !     !    !2.81    !     !   +----------------------------------++--------+-----+----+--------+-----+ !GSV Low Thigh                     !!3.71    !     !    !2.88    !     !   +----------------------------------++--------+-----+----+--------+-----+   ! GSV Knee                          !!3.77    !     !    !1.78    !     !   +----------------------------------++--------+-----+----+--------+-----+   ! GSV High Calf                     !!2.01    !     !    !1.64    !     !   +----------------------------------++--------+-----+----+--------+-----+   ! GSV Mid Calf                      !!1.83    !     !    !1. 6     !     !   +----------------------------------++--------+-----+----+--------+-----+   ! GSV Low Calf                      !!1.55    !     !    !1.41    !     !   +----------------------------------++--------+-----+----+--------+-----+   ! GSV Ankle                         !!1.73    !     !    !2.33    !     !     Carotid duplex: 3/24/22  Summary        The bilateral internal carotid arteries reveal a <50% diameter reducing    stenosis.    The bilateral vertebral arteries demonstrate normal antegrade flow. TTE: 3/24/22   Summary   Normal left ventricle size, wall thickness, and systolic function with an   estimated ejection fraction of 40-45%. Mild hypokinesis of the inferior, inferoseptal wall segments. Normal left ventricular diastolic filling pressures. The right ventricle is normal in size and function. Trace mitral and tricuspid valve regurgitation. Systolic pulmonary artery pressure (sPAP) is normal and estimated at 23 mmHg   (right atrial pressure 3 mmHg)   Definity® used for myocardial border enhancement. Bedside spirometry: 3/24/22 (full copy of report on chart)   FVC 52% pred  FEV1 47% pred  FEV1/FVC 91% pred     Assess/Plan:   AM labs and imaging reviewed as above. IM and Cardiology notes reviewed.      CAD, inferior STEMI   -ASA, statin, BB, ACE  -s/p PCI with DANIA to RCA, severe LM disease   -Integrilin gtt: d/c 3/29 at 0130   -Brilinta washout (loading dose 3/24)   -CABG planned for tomorrow, Tuesday 3/29  -Pre-operative testing completed and reviewed as above: vein mapping, carotid duplex, echo bedside spiromery.  UA unremarkable  ________________________________________________________________    JESSICA Phillips - CNP  3/28/2022  9:33 AM

## 2022-03-28 NOTE — PROGRESS NOTES
Hospitalist Progress Note      PCP: No primary care provider on file. Date of Admission: 3/24/2022    Chief Complaint: chest pain    Hospital Course: reviewed     Subjective: resting in bed, no chest pain       Medications:  Reviewed    Infusion Medications    eptifibatide 2 mcg/kg/min (03/28/22 0749)    sodium chloride       Scheduled Medications    pantoprazole  40 mg Oral QAM AC    sodium chloride flush  5-40 mL IntraVENous 2 times per day    carvedilol  3.125 mg Oral BID WC    lisinopril  5 mg Oral Daily    aspirin  81 mg Oral Daily    atorvastatin  80 mg Oral Nightly    enoxaparin  40 mg SubCUTAneous Daily    mupirocin   Nasal BID    multivitamin  1 tablet Oral Daily     PRN Meds: sodium chloride flush, perflutren lipid microspheres, sodium chloride, ondansetron **OR** ondansetron, acetaminophen **OR** acetaminophen, polyethylene glycol, morphine **OR** morphine      Intake/Output Summary (Last 24 hours) at 3/28/2022 0902  Last data filed at 3/28/2022 0511  Gross per 24 hour   Intake 331.56 ml   Output --   Net 331.56 ml       Physical Exam Performed:    /72   Pulse 73   Temp 98.3 °F (36.8 °C) (Oral)   Resp 18   Ht 5' 11\" (1.803 m)   Wt 189 lb 6.4 oz (85.9 kg)   SpO2 94%   BMI 26.42 kg/m²     General appearance: No apparent distress, appears stated age and cooperative. HEENT: Pupils equal, round, and reactive to light. Conjunctivae/corneas clear. Neck: Supple, with full range of motion. No jugular venous distention. Trachea midline. Respiratory:  Normal respiratory effort. Clear to auscultation, bilaterally without Rales/Wheezes/Rhonchi. Cardiovascular: Regular rate and rhythm with normal S1/S2 without murmurs, rubs or gallops. Abdomen: Soft, non-tender, non-distended with normal bowel sounds. Musculoskeletal: No clubbing, cyanosis or edema bilaterally. Full range of motion without deformity. Skin: Skin color, texture, turgor normal.  No rashes or lesions.   Neurologic: Neurovascularly intact without any focal sensory/motor deficits. Cranial nerves: II-XII intact, grossly non-focal.  Psychiatric: Alert and oriented, thought content appropriate, normal insight  Capillary Refill: Brisk,3 seconds, normal   Peripheral Pulses: +2 palpable, equal bilaterally       Labs:   Recent Labs     03/26/22  0417   WBC 8.6   HGB 12.2*   HCT 36.3*        Recent Labs     03/26/22  0417 03/27/22  0419 03/28/22  0503    141 136   K 4.0 3.8 3.9    105 101   CO2 25 25 23   BUN 18 16 19   CREATININE 0.9 0.8* 0.8*   CALCIUM 9.3 8.5 9.0     No results for input(s): AST, ALT, BILIDIR, BILITOT, ALKPHOS in the last 72 hours. No results for input(s): INR in the last 72 hours. No results for input(s): Myla Lowers in the last 72 hours. Urinalysis:      Lab Results   Component Value Date    NITRU Negative 03/24/2022    WBCUA 0-2 03/24/2022    RBCUA 0-2 03/24/2022    BLOODU MODERATE 03/24/2022    SPECGRAV 1.010 03/24/2022    GLUCOSEU Negative 03/24/2022       Radiology:  VL DUP CAROTID BILATERAL   Final Result      VL PRE OP VEIN MAPPING   Final Result      XR CHEST PORTABLE   Final Result   No acute cardiopulmonary disease. Assessment/Plan:    Active Hospital Problems    Diagnosis     CAD in native artery [I25.10]     STEMI (ST elevation myocardial infarction) ([facility] [address] [I21.3]      Chest pain- due to STEMI , status post PCI with a DANIA to RCA, severe LM disease cardiology and CT surgery consulted and following. Plan for CABG on 3/29/2022.  -on Integrilin ggt  -on asa/statin    Hypertension- continued with home medication. -on bb/acei    Hyperlipidemia -on statin. DVT Prophylaxis: lovenox  Diet: ADULT DIET;  Regular  Code Status: Full Code    PT/OT Eval Status: not ordered    Dispo - icu care, pending CABG 3/29    Killian Powell MD

## 2022-03-29 ENCOUNTER — APPOINTMENT (OUTPATIENT)
Dept: GENERAL RADIOLOGY | Age: 57
DRG: 232 | End: 2022-03-29
Payer: COMMERCIAL

## 2022-03-29 ENCOUNTER — ANESTHESIA (OUTPATIENT)
Dept: OPERATING ROOM | Age: 57
DRG: 232 | End: 2022-03-29
Payer: COMMERCIAL

## 2022-03-29 VITALS
RESPIRATION RATE: 17 BRPM | SYSTOLIC BLOOD PRESSURE: 88 MMHG | TEMPERATURE: 97.6 F | DIASTOLIC BLOOD PRESSURE: 64 MMHG | OXYGEN SATURATION: 100 %

## 2022-03-29 LAB
ACTIVATED CLOTTING TIME: 102 SEC (ref 99–130)
ACTIVATED CLOTTING TIME: 105 SEC (ref 99–130)
ACTIVATED CLOTTING TIME: 518 SEC (ref 99–130)
ACTIVATED CLOTTING TIME: 553 SEC (ref 99–130)
ACTIVATED CLOTTING TIME: 615 SEC (ref 99–130)
ALBUMIN SERPL-MCNC: 4.2 G/DL (ref 3.4–5)
ALP BLD-CCNC: 163 U/L (ref 40–129)
ALT SERPL-CCNC: 38 U/L (ref 10–40)
ANION GAP SERPL CALCULATED.3IONS-SCNC: 12 MMOL/L (ref 3–16)
ANION GAP SERPL CALCULATED.3IONS-SCNC: 12 MMOL/L (ref 3–16)
ANION GAP SERPL CALCULATED.3IONS-SCNC: 9 MMOL/L (ref 3–16)
APTT: 34.4 SEC (ref 26.2–38.6)
APTT: 36.8 SEC (ref 26.2–38.6)
AST SERPL-CCNC: 44 U/L (ref 15–37)
BASE EXCESS ARTERIAL: 0 (ref -3–3)
BASE EXCESS ARTERIAL: 0 (ref -3–3)
BASE EXCESS ARTERIAL: 1 (ref -3–3)
BASE EXCESS VENOUS: -1 (ref -3–3)
BASOPHILS ABSOLUTE: 0 K/UL (ref 0–0.2)
BASOPHILS RELATIVE PERCENT: 0.5 %
BILIRUB SERPL-MCNC: 0.9 MG/DL (ref 0–1)
BILIRUBIN DIRECT: 0.3 MG/DL (ref 0–0.3)
BILIRUBIN, INDIRECT: 0.6 MG/DL (ref 0–1)
BLOOD BANK DISPENSE STATUS: NORMAL
BLOOD BANK DISPENSE STATUS: NORMAL
BLOOD BANK PRODUCT CODE: NORMAL
BLOOD BANK PRODUCT CODE: NORMAL
BPU ID: NORMAL
BPU ID: NORMAL
BUN BLDV-MCNC: 18 MG/DL (ref 7–20)
BUN BLDV-MCNC: 21 MG/DL (ref 7–20)
BUN BLDV-MCNC: 21 MG/DL (ref 7–20)
CALCIUM IONIZED: 1.04 MMOL/L (ref 1.12–1.32)
CALCIUM IONIZED: 1.04 MMOL/L (ref 1.12–1.32)
CALCIUM IONIZED: 1.1 MMOL/L (ref 1.12–1.32)
CALCIUM IONIZED: 1.12 MMOL/L (ref 1.12–1.32)
CALCIUM IONIZED: 1.14 MMOL/L (ref 1.12–1.32)
CALCIUM IONIZED: 1.16 MMOL/L (ref 1.12–1.32)
CALCIUM SERPL-MCNC: 7.3 MG/DL (ref 8.3–10.6)
CALCIUM SERPL-MCNC: 9.1 MG/DL (ref 8.3–10.6)
CALCIUM SERPL-MCNC: 9.2 MG/DL (ref 8.3–10.6)
CHLORIDE BLD-SCNC: 100 MMOL/L (ref 99–110)
CHLORIDE BLD-SCNC: 101 MMOL/L (ref 99–110)
CHLORIDE BLD-SCNC: 101 MMOL/L (ref 99–110)
CHOLESTEROL, TOTAL: 180 MG/DL (ref 0–199)
CO2: 23 MMOL/L (ref 21–32)
CO2: 24 MMOL/L (ref 21–32)
CO2: 24 MMOL/L (ref 21–32)
CREAT SERPL-MCNC: 0.9 MG/DL (ref 0.9–1.3)
DESCRIPTION BLOOD BANK: NORMAL
DESCRIPTION BLOOD BANK: NORMAL
EOSINOPHILS ABSOLUTE: 0.3 K/UL (ref 0–0.6)
EOSINOPHILS RELATIVE PERCENT: 3.3 %
ESTIMATED AVERAGE GLUCOSE: 102.5 MG/DL
FIBRINOGEN: 257 MG/DL (ref 200–397)
FIBRINOGEN: 286 MG/DL (ref 200–397)
GFR AFRICAN AMERICAN: >60
GFR NON-AFRICAN AMERICAN: >60
GLUCOSE BLD-MCNC: 106 MG/DL (ref 70–99)
GLUCOSE BLD-MCNC: 107 MG/DL (ref 70–99)
GLUCOSE BLD-MCNC: 114 MG/DL (ref 70–99)
GLUCOSE BLD-MCNC: 116 MG/DL (ref 70–99)
GLUCOSE BLD-MCNC: 116 MG/DL (ref 70–99)
GLUCOSE BLD-MCNC: 119 MG/DL (ref 70–99)
GLUCOSE BLD-MCNC: 121 MG/DL (ref 70–99)
GLUCOSE BLD-MCNC: 121 MG/DL (ref 70–99)
GLUCOSE BLD-MCNC: 122 MG/DL (ref 70–99)
GLUCOSE BLD-MCNC: 126 MG/DL (ref 70–99)
GLUCOSE BLD-MCNC: 129 MG/DL (ref 70–99)
GLUCOSE BLD-MCNC: 135 MG/DL (ref 70–99)
GLUCOSE BLD-MCNC: 145 MG/DL (ref 70–99)
GLUCOSE BLD-MCNC: 145 MG/DL (ref 70–99)
GLUCOSE BLD-MCNC: 148 MG/DL (ref 70–99)
GLUCOSE BLD-MCNC: 154 MG/DL (ref 70–99)
GLUCOSE BLD-MCNC: 159 MG/DL (ref 70–99)
GLUCOSE BLD-MCNC: 168 MG/DL (ref 70–99)
GLUCOSE BLD-MCNC: 89 MG/DL (ref 70–99)
GLUCOSE BLD-MCNC: 96 MG/DL (ref 70–99)
HBA1C MFR BLD: 5.2 %
HCO3 ARTERIAL: 25 MMOL/L (ref 21–29)
HCO3 ARTERIAL: 25.2 MMOL/L (ref 21–29)
HCO3 ARTERIAL: 25.4 MMOL/L (ref 21–29)
HCO3 ARTERIAL: 25.4 MMOL/L (ref 21–29)
HCO3 ARTERIAL: 27.1 MMOL/L (ref 21–29)
HCO3 VENOUS: 25.7 MMOL/L (ref 23–29)
HCT VFR BLD CALC: 26.5 % (ref 40.5–52.5)
HCT VFR BLD CALC: 31.6 % (ref 40.5–52.5)
HCT VFR BLD CALC: 35.8 % (ref 40.5–52.5)
HDLC SERPL-MCNC: 33 MG/DL (ref 40–60)
HEMOGLOBIN: 10 GM/DL (ref 13.5–17.5)
HEMOGLOBIN: 10.6 G/DL (ref 13.5–17.5)
HEMOGLOBIN: 10.9 GM/DL (ref 13.5–17.5)
HEMOGLOBIN: 12 GM/DL (ref 13.5–17.5)
HEMOGLOBIN: 12.2 G/DL (ref 13.5–17.5)
HEMOGLOBIN: 8.9 GM/DL (ref 13.5–17.5)
HEMOGLOBIN: 9 G/DL (ref 13.5–17.5)
HEMOGLOBIN: 9 GM/DL (ref 13.5–17.5)
HEMOGLOBIN: 9.1 GM/DL (ref 13.5–17.5)
INR BLD: 1.07 (ref 0.88–1.12)
INR BLD: 1.33 (ref 0.88–1.12)
INR BLD: 1.5 (ref 0.88–1.12)
LACTATE: 0.65 MMOL/L (ref 0.4–2)
LACTATE: 1.4 MMOL/L (ref 0.4–2)
LACTATE: 2.27 MMOL/L (ref 0.4–2)
LDL CHOLESTEROL CALCULATED: 121 MG/DL
LYMPHOCYTES ABSOLUTE: 1.6 K/UL (ref 1–5.1)
LYMPHOCYTES RELATIVE PERCENT: 17.7 %
MAGNESIUM: 2.2 MG/DL (ref 1.8–2.4)
MAGNESIUM: 3.1 MG/DL (ref 1.8–2.4)
MCH RBC QN AUTO: 31.5 PG (ref 26–34)
MCH RBC QN AUTO: 31.7 PG (ref 26–34)
MCH RBC QN AUTO: 31.7 PG (ref 26–34)
MCHC RBC AUTO-ENTMCNC: 33.6 G/DL (ref 31–36)
MCHC RBC AUTO-ENTMCNC: 34 G/DL (ref 31–36)
MCHC RBC AUTO-ENTMCNC: 34.1 G/DL (ref 31–36)
MCV RBC AUTO: 92.4 FL (ref 80–100)
MCV RBC AUTO: 93.4 FL (ref 80–100)
MCV RBC AUTO: 94.3 FL (ref 80–100)
MONOCYTES ABSOLUTE: 0.8 K/UL (ref 0–1.3)
MONOCYTES RELATIVE PERCENT: 8.6 %
NEUTROPHILS ABSOLUTE: 6.2 K/UL (ref 1.7–7.7)
NEUTROPHILS RELATIVE PERCENT: 69.9 %
O2 SAT, ARTERIAL: 100 % (ref 93–100)
O2 SAT, VEN: 100 %
PCO2 ARTERIAL: 36.9 MM HG (ref 35–45)
PCO2 ARTERIAL: 37.5 MM HG (ref 35–45)
PCO2 ARTERIAL: 41.9 MM HG (ref 35–45)
PCO2 ARTERIAL: 44.5 MM HG (ref 35–45)
PCO2 ARTERIAL: 55.1 MM HG (ref 35–45)
PCO2, VEN: 56.3 MM HG (ref 40–50)
PDW BLD-RTO: 12.6 % (ref 12.4–15.4)
PDW BLD-RTO: 12.7 % (ref 12.4–15.4)
PDW BLD-RTO: 13.1 % (ref 12.4–15.4)
PERFORMED ON: ABNORMAL
PERFORMED ON: NORMAL
PH ARTERIAL: 7.3 (ref 7.35–7.45)
PH ARTERIAL: 7.36 (ref 7.35–7.45)
PH ARTERIAL: 7.38 (ref 7.35–7.45)
PH ARTERIAL: 7.44 (ref 7.35–7.45)
PH ARTERIAL: 7.44 (ref 7.35–7.45)
PH VENOUS: 7.27 (ref 7.35–7.45)
PLATELET # BLD: 111 K/UL (ref 135–450)
PLATELET # BLD: 132 K/UL (ref 135–450)
PLATELET # BLD: 203 K/UL (ref 135–450)
PMV BLD AUTO: 8.6 FL (ref 5–10.5)
PMV BLD AUTO: 8.6 FL (ref 5–10.5)
PMV BLD AUTO: 9.4 FL (ref 5–10.5)
PO2 ARTERIAL: 401.4 MM HG (ref 75–108)
PO2 ARTERIAL: 500.5 MM HG (ref 75–108)
PO2 ARTERIAL: 507.5 MM HG (ref 75–108)
PO2 ARTERIAL: 513.1 MM HG (ref 75–108)
PO2 ARTERIAL: 567.9 MM HG (ref 75–108)
PO2, VEN: 223 MM HG
POC HEMATOCRIT: 26 % (ref 40.5–52.5)
POC HEMATOCRIT: 26 % (ref 40.5–52.5)
POC HEMATOCRIT: 27 % (ref 40.5–52.5)
POC HEMATOCRIT: 29 % (ref 40.5–52.5)
POC HEMATOCRIT: 32 % (ref 40.5–52.5)
POC HEMATOCRIT: 35 % (ref 40.5–52.5)
POC POTASSIUM: 4 MMOL/L (ref 3.5–5.1)
POC POTASSIUM: 4 MMOL/L (ref 3.5–5.1)
POC POTASSIUM: 4.6 MMOL/L (ref 3.5–5.1)
POC POTASSIUM: 4.6 MMOL/L (ref 3.5–5.1)
POC POTASSIUM: 4.7 MMOL/L (ref 3.5–5.1)
POC POTASSIUM: 5.1 MMOL/L (ref 3.5–5.1)
POC SAMPLE TYPE: ABNORMAL
POC SODIUM: 134 MMOL/L (ref 136–145)
POC SODIUM: 135 MMOL/L (ref 136–145)
POC SODIUM: 135 MMOL/L (ref 136–145)
POC SODIUM: 136 MMOL/L (ref 136–145)
POC SODIUM: 137 MMOL/L (ref 136–145)
POC SODIUM: 139 MMOL/L (ref 136–145)
POTASSIUM REFLEX MAGNESIUM: 3.9 MMOL/L (ref 3.5–5.1)
POTASSIUM SERPL-SCNC: 3.9 MMOL/L (ref 3.5–5.1)
POTASSIUM SERPL-SCNC: 4.2 MMOL/L (ref 3.5–5.1)
POTASSIUM SERPL-SCNC: 4.4 MMOL/L (ref 3.5–5.1)
POTASSIUM SERPL-SCNC: 4.6 MMOL/L (ref 3.5–5.1)
PROTHROMBIN TIME: 12.1 SEC (ref 9.9–12.7)
PROTHROMBIN TIME: 15.2 SEC (ref 9.9–12.7)
PROTHROMBIN TIME: 17.2 SEC (ref 9.9–12.7)
RBC # BLD: 2.83 M/UL (ref 4.2–5.9)
RBC # BLD: 3.35 M/UL (ref 4.2–5.9)
RBC # BLD: 3.88 M/UL (ref 4.2–5.9)
SODIUM BLD-SCNC: 133 MMOL/L (ref 136–145)
SODIUM BLD-SCNC: 136 MMOL/L (ref 136–145)
SODIUM BLD-SCNC: 137 MMOL/L (ref 136–145)
TCO2 ARTERIAL: 26 MMOL/L
TCO2 ARTERIAL: 27 MMOL/L
TCO2 ARTERIAL: 29 MMOL/L
TCO2 CALC VENOUS: 27 MMOL/L
TOTAL PROTEIN: 6.5 G/DL (ref 6.4–8.2)
TRIGL SERPL-MCNC: 132 MG/DL (ref 0–150)
VLDLC SERPL CALC-MCNC: 26 MG/DL
WBC # BLD: 13.2 K/UL (ref 4–11)
WBC # BLD: 6.2 K/UL (ref 4–11)
WBC # BLD: 8.8 K/UL (ref 4–11)

## 2022-03-29 PROCEDURE — 2140000000 HC CCU INTERMEDIATE R&B

## 2022-03-29 PROCEDURE — 36430 TRANSFUSION BLD/BLD COMPNT: CPT

## 2022-03-29 PROCEDURE — 2700000000 HC OXYGEN THERAPY PER DAY

## 2022-03-29 PROCEDURE — 80076 HEPATIC FUNCTION PANEL: CPT

## 2022-03-29 PROCEDURE — 6360000002 HC RX W HCPCS: Performed by: STUDENT IN AN ORGANIZED HEALTH CARE EDUCATION/TRAINING PROGRAM

## 2022-03-29 PROCEDURE — 6370000000 HC RX 637 (ALT 250 FOR IP): Performed by: STUDENT IN AN ORGANIZED HEALTH CARE EDUCATION/TRAINING PROGRAM

## 2022-03-29 PROCEDURE — 6370000000 HC RX 637 (ALT 250 FOR IP): Performed by: HOSPITALIST

## 2022-03-29 PROCEDURE — 2580000003 HC RX 258: Performed by: NURSE PRACTITIONER

## 2022-03-29 PROCEDURE — 33533 CABG ARTERIAL SINGLE: CPT | Performed by: STUDENT IN AN ORGANIZED HEALTH CARE EDUCATION/TRAINING PROGRAM

## 2022-03-29 PROCEDURE — 71045 X-RAY EXAM CHEST 1 VIEW: CPT

## 2022-03-29 PROCEDURE — 3600000008 HC SURGERY OHS BASE: Performed by: STUDENT IN AN ORGANIZED HEALTH CARE EDUCATION/TRAINING PROGRAM

## 2022-03-29 PROCEDURE — 2500000003 HC RX 250 WO HCPCS: Performed by: ANESTHESIOLOGY

## 2022-03-29 PROCEDURE — C1889 IMPLANT/INSERT DEVICE, NOC: HCPCS | Performed by: STUDENT IN AN ORGANIZED HEALTH CARE EDUCATION/TRAINING PROGRAM

## 2022-03-29 PROCEDURE — 02100Z9 BYPASS CORONARY ARTERY, ONE ARTERY FROM LEFT INTERNAL MAMMARY, OPEN APPROACH: ICD-10-PCS | Performed by: STUDENT IN AN ORGANIZED HEALTH CARE EDUCATION/TRAINING PROGRAM

## 2022-03-29 PROCEDURE — 36415 COLL VENOUS BLD VENIPUNCTURE: CPT

## 2022-03-29 PROCEDURE — 85027 COMPLETE CBC AUTOMATED: CPT

## 2022-03-29 PROCEDURE — 6370000000 HC RX 637 (ALT 250 FOR IP): Performed by: ANESTHESIOLOGY

## 2022-03-29 PROCEDURE — 2500000003 HC RX 250 WO HCPCS: Performed by: STUDENT IN AN ORGANIZED HEALTH CARE EDUCATION/TRAINING PROGRAM

## 2022-03-29 PROCEDURE — 82803 BLOOD GASES ANY COMBINATION: CPT

## 2022-03-29 PROCEDURE — 85730 THROMBOPLASTIN TIME PARTIAL: CPT

## 2022-03-29 PROCEDURE — 82330 ASSAY OF CALCIUM: CPT

## 2022-03-29 PROCEDURE — 85610 PROTHROMBIN TIME: CPT

## 2022-03-29 PROCEDURE — 83735 ASSAY OF MAGNESIUM: CPT

## 2022-03-29 PROCEDURE — 85347 COAGULATION TIME ACTIVATED: CPT

## 2022-03-29 PROCEDURE — 85014 HEMATOCRIT: CPT

## 2022-03-29 PROCEDURE — 84132 ASSAY OF SERUM POTASSIUM: CPT

## 2022-03-29 PROCEDURE — 3600000018 HC SURGERY OHS ADDTL 15MIN: Performed by: STUDENT IN AN ORGANIZED HEALTH CARE EDUCATION/TRAINING PROGRAM

## 2022-03-29 PROCEDURE — 06BP3ZZ EXCISION OF RIGHT SAPHENOUS VEIN, PERCUTANEOUS APPROACH: ICD-10-PCS | Performed by: STUDENT IN AN ORGANIZED HEALTH CARE EDUCATION/TRAINING PROGRAM

## 2022-03-29 PROCEDURE — B246ZZ4 ULTRASONOGRAPHY OF RIGHT AND LEFT HEART, TRANSESOPHAGEAL: ICD-10-PCS | Performed by: STUDENT IN AN ORGANIZED HEALTH CARE EDUCATION/TRAINING PROGRAM

## 2022-03-29 PROCEDURE — 7100000011 HC PHASE II RECOVERY - ADDTL 15 MIN

## 2022-03-29 PROCEDURE — 6360000002 HC RX W HCPCS: Performed by: NURSE PRACTITIONER

## 2022-03-29 PROCEDURE — 3700000001 HC ADD 15 MINUTES (ANESTHESIA): Performed by: STUDENT IN AN ORGANIZED HEALTH CARE EDUCATION/TRAINING PROGRAM

## 2022-03-29 PROCEDURE — 84295 ASSAY OF SERUM SODIUM: CPT

## 2022-03-29 PROCEDURE — 94761 N-INVAS EAR/PLS OXIMETRY MLT: CPT

## 2022-03-29 PROCEDURE — 33517 CABG ARTERY-VEIN SINGLE: CPT | Performed by: STUDENT IN AN ORGANIZED HEALTH CARE EDUCATION/TRAINING PROGRAM

## 2022-03-29 PROCEDURE — 2100000000 HC CCU R&B

## 2022-03-29 PROCEDURE — C9290 INJ, BUPIVACAINE LIPOSOME: HCPCS | Performed by: STUDENT IN AN ORGANIZED HEALTH CARE EDUCATION/TRAINING PROGRAM

## 2022-03-29 PROCEDURE — 5A1221Z PERFORMANCE OF CARDIAC OUTPUT, CONTINUOUS: ICD-10-PCS | Performed by: STUDENT IN AN ORGANIZED HEALTH CARE EDUCATION/TRAINING PROGRAM

## 2022-03-29 PROCEDURE — 2580000003 HC RX 258: Performed by: INTERNAL MEDICINE

## 2022-03-29 PROCEDURE — 2580000003 HC RX 258: Performed by: STUDENT IN AN ORGANIZED HEALTH CARE EDUCATION/TRAINING PROGRAM

## 2022-03-29 PROCEDURE — 3700000000 HC ANESTHESIA ATTENDED CARE: Performed by: STUDENT IN AN ORGANIZED HEALTH CARE EDUCATION/TRAINING PROGRAM

## 2022-03-29 PROCEDURE — 80061 LIPID PANEL: CPT

## 2022-03-29 PROCEDURE — 83605 ASSAY OF LACTIC ACID: CPT

## 2022-03-29 PROCEDURE — C1729 CATH, DRAINAGE: HCPCS | Performed by: STUDENT IN AN ORGANIZED HEALTH CARE EDUCATION/TRAINING PROGRAM

## 2022-03-29 PROCEDURE — 6360000002 HC RX W HCPCS: Performed by: ANESTHESIOLOGY

## 2022-03-29 PROCEDURE — A4217 STERILE WATER/SALINE, 500 ML: HCPCS | Performed by: STUDENT IN AN ORGANIZED HEALTH CARE EDUCATION/TRAINING PROGRAM

## 2022-03-29 PROCEDURE — A4216 STERILE WATER/SALINE, 10 ML: HCPCS | Performed by: STUDENT IN AN ORGANIZED HEALTH CARE EDUCATION/TRAINING PROGRAM

## 2022-03-29 PROCEDURE — 2580000003 HC RX 258: Performed by: ANESTHESIOLOGY

## 2022-03-29 PROCEDURE — 6370000000 HC RX 637 (ALT 250 FOR IP): Performed by: NURSE PRACTITIONER

## 2022-03-29 PROCEDURE — 82947 ASSAY GLUCOSE BLOOD QUANT: CPT

## 2022-03-29 PROCEDURE — 2709999900 HC NON-CHARGEABLE SUPPLY: Performed by: STUDENT IN AN ORGANIZED HEALTH CARE EDUCATION/TRAINING PROGRAM

## 2022-03-29 PROCEDURE — 80048 BASIC METABOLIC PNL TOTAL CA: CPT

## 2022-03-29 PROCEDURE — 85025 COMPLETE CBC W/AUTO DIFF WBC: CPT

## 2022-03-29 PROCEDURE — 7100000010 HC PHASE II RECOVERY - FIRST 15 MIN

## 2022-03-29 PROCEDURE — 02L70CK OCCLUSION OF LEFT ATRIAL APPENDAGE WITH EXTRALUMINAL DEVICE, OPEN APPROACH: ICD-10-PCS | Performed by: STUDENT IN AN ORGANIZED HEALTH CARE EDUCATION/TRAINING PROGRAM

## 2022-03-29 PROCEDURE — 85384 FIBRINOGEN ACTIVITY: CPT

## 2022-03-29 PROCEDURE — 83036 HEMOGLOBIN GLYCOSYLATED A1C: CPT

## 2022-03-29 DEVICE — Z INACTIVE USE 2424443 DEVICE OCCL CLP L40MM SM FOOTPRINT 1 HND APPL SUTURELESS W/: Type: IMPLANTABLE DEVICE | Site: HEART | Status: FUNCTIONAL

## 2022-03-29 RX ORDER — SODIUM CHLORIDE 9 MG/ML
INJECTION, SOLUTION INTRAVENOUS PRN
Status: DISCONTINUED | OUTPATIENT
Start: 2022-03-29 | End: 2022-03-29

## 2022-03-29 RX ORDER — NICOTINE POLACRILEX 4 MG
15 LOZENGE BUCCAL PRN
Status: DISCONTINUED | OUTPATIENT
Start: 2022-03-29 | End: 2022-04-01

## 2022-03-29 RX ORDER — MORPHINE SULFATE 4 MG/ML
4 INJECTION, SOLUTION INTRAMUSCULAR; INTRAVENOUS
Status: DISCONTINUED | OUTPATIENT
Start: 2022-03-29 | End: 2022-04-02 | Stop reason: HOSPADM

## 2022-03-29 RX ORDER — PROTAMINE SULFATE 10 MG/ML
50 INJECTION, SOLUTION INTRAVENOUS
Status: ACTIVE | OUTPATIENT
Start: 2022-03-29 | End: 2022-03-29

## 2022-03-29 RX ORDER — LANOLIN ALCOHOL/MO/W.PET/CERES
400 CREAM (GRAM) TOPICAL 2 TIMES DAILY
Status: DISCONTINUED | OUTPATIENT
Start: 2022-03-30 | End: 2022-04-02 | Stop reason: HOSPADM

## 2022-03-29 RX ORDER — SODIUM CHLORIDE 0.9 % (FLUSH) 0.9 %
10 SYRINGE (ML) INJECTION EVERY 12 HOURS SCHEDULED
Status: DISCONTINUED | OUTPATIENT
Start: 2022-03-29 | End: 2022-03-29

## 2022-03-29 RX ORDER — MIDAZOLAM HYDROCHLORIDE 1 MG/ML
INJECTION INTRAMUSCULAR; INTRAVENOUS PRN
Status: DISCONTINUED | OUTPATIENT
Start: 2022-03-29 | End: 2022-03-29 | Stop reason: SDUPTHER

## 2022-03-29 RX ORDER — MORPHINE SULFATE 2 MG/ML
2 INJECTION, SOLUTION INTRAMUSCULAR; INTRAVENOUS
Status: DISCONTINUED | OUTPATIENT
Start: 2022-03-29 | End: 2022-04-02 | Stop reason: HOSPADM

## 2022-03-29 RX ORDER — ONDANSETRON 2 MG/ML
4 INJECTION INTRAMUSCULAR; INTRAVENOUS EVERY 6 HOURS PRN
Status: DISCONTINUED | OUTPATIENT
Start: 2022-03-29 | End: 2022-04-02 | Stop reason: HOSPADM

## 2022-03-29 RX ORDER — DEXTROSE MONOHYDRATE 50 MG/ML
100 INJECTION, SOLUTION INTRAVENOUS PRN
Status: DISCONTINUED | OUTPATIENT
Start: 2022-03-29 | End: 2022-04-01

## 2022-03-29 RX ORDER — NEOSTIGMINE METHYLSULFATE 1 MG/ML
INJECTION, SOLUTION INTRAVENOUS PRN
Status: DISCONTINUED | OUTPATIENT
Start: 2022-03-29 | End: 2022-03-29 | Stop reason: SDUPTHER

## 2022-03-29 RX ORDER — ALBUMIN, HUMAN INJ 5% 5 %
25 SOLUTION INTRAVENOUS PRN
Status: DISCONTINUED | OUTPATIENT
Start: 2022-03-29 | End: 2022-03-30

## 2022-03-29 RX ORDER — SUFENTANIL CITRATE 50 UG/ML
INJECTION EPIDURAL; INTRAVENOUS PRN
Status: DISCONTINUED | OUTPATIENT
Start: 2022-03-29 | End: 2022-03-29 | Stop reason: SDUPTHER

## 2022-03-29 RX ORDER — SODIUM CHLORIDE 0.9 % (FLUSH) 0.9 %
10 SYRINGE (ML) INJECTION PRN
Status: DISCONTINUED | OUTPATIENT
Start: 2022-03-29 | End: 2022-03-29

## 2022-03-29 RX ORDER — FUROSEMIDE 10 MG/ML
20 INJECTION INTRAMUSCULAR; INTRAVENOUS EVERY 6 HOURS SCHEDULED
Status: DISCONTINUED | OUTPATIENT
Start: 2022-03-30 | End: 2022-03-31

## 2022-03-29 RX ORDER — GLYCOPYRROLATE 0.2 MG/ML
INJECTION INTRAMUSCULAR; INTRAVENOUS PRN
Status: DISCONTINUED | OUTPATIENT
Start: 2022-03-29 | End: 2022-03-29 | Stop reason: SDUPTHER

## 2022-03-29 RX ORDER — ASPIRIN 81 MG/1
81 TABLET ORAL DAILY
Status: DISCONTINUED | OUTPATIENT
Start: 2022-03-30 | End: 2022-04-02 | Stop reason: HOSPADM

## 2022-03-29 RX ORDER — OXYCODONE HYDROCHLORIDE 5 MG/1
5 TABLET ORAL EVERY 4 HOURS PRN
Status: DISCONTINUED | OUTPATIENT
Start: 2022-03-29 | End: 2022-04-02 | Stop reason: HOSPADM

## 2022-03-29 RX ORDER — SODIUM CHLORIDE 9 MG/ML
INJECTION, SOLUTION INTRAVENOUS CONTINUOUS PRN
Status: DISCONTINUED | OUTPATIENT
Start: 2022-03-29 | End: 2022-03-29 | Stop reason: SDUPTHER

## 2022-03-29 RX ORDER — INSULIN GLARGINE 100 [IU]/ML
0.15 INJECTION, SOLUTION SUBCUTANEOUS NIGHTLY
Status: DISCONTINUED | OUTPATIENT
Start: 2022-03-30 | End: 2022-03-30

## 2022-03-29 RX ORDER — DEXTROSE AND SODIUM CHLORIDE 5; .45 G/100ML; G/100ML
INJECTION, SOLUTION INTRAVENOUS CONTINUOUS
Status: DISCONTINUED | OUTPATIENT
Start: 2022-03-29 | End: 2022-03-30

## 2022-03-29 RX ORDER — DOBUTAMINE HYDROCHLORIDE 200 MG/100ML
INJECTION INTRAVENOUS CONTINUOUS PRN
Status: DISCONTINUED | OUTPATIENT
Start: 2022-03-29 | End: 2022-03-29 | Stop reason: SDUPTHER

## 2022-03-29 RX ORDER — ONDANSETRON 2 MG/ML
INJECTION INTRAMUSCULAR; INTRAVENOUS PRN
Status: DISCONTINUED | OUTPATIENT
Start: 2022-03-29 | End: 2022-03-29 | Stop reason: SDUPTHER

## 2022-03-29 RX ORDER — ALBUTEROL SULFATE 2.5 MG/3ML
2.5 SOLUTION RESPIRATORY (INHALATION) EVERY 6 HOURS PRN
Status: DISCONTINUED | OUTPATIENT
Start: 2022-03-29 | End: 2022-03-29

## 2022-03-29 RX ORDER — HYDRALAZINE HYDROCHLORIDE 20 MG/ML
5 INJECTION INTRAMUSCULAR; INTRAVENOUS EVERY 5 MIN PRN
Status: DISCONTINUED | OUTPATIENT
Start: 2022-03-29 | End: 2022-03-30

## 2022-03-29 RX ORDER — PROTAMINE SULFATE 10 MG/ML
INJECTION, SOLUTION INTRAVENOUS PRN
Status: DISCONTINUED | OUTPATIENT
Start: 2022-03-29 | End: 2022-03-29 | Stop reason: SDUPTHER

## 2022-03-29 RX ORDER — ALBUTEROL SULFATE 2.5 MG/3ML
2.5 SOLUTION RESPIRATORY (INHALATION)
Status: DISCONTINUED | OUTPATIENT
Start: 2022-03-29 | End: 2022-03-29

## 2022-03-29 RX ORDER — ONDANSETRON 4 MG/1
4 TABLET, ORALLY DISINTEGRATING ORAL EVERY 8 HOURS PRN
Status: DISCONTINUED | OUTPATIENT
Start: 2022-03-29 | End: 2022-04-02 | Stop reason: HOSPADM

## 2022-03-29 RX ORDER — FAMOTIDINE 20 MG/1
20 TABLET, FILM COATED ORAL 2 TIMES DAILY
Status: DISCONTINUED | OUTPATIENT
Start: 2022-03-29 | End: 2022-03-30

## 2022-03-29 RX ORDER — ALBUTEROL SULFATE 2.5 MG/3ML
2.5 SOLUTION RESPIRATORY (INHALATION) EVERY 6 HOURS PRN
Status: DISCONTINUED | OUTPATIENT
Start: 2022-03-29 | End: 2022-04-01

## 2022-03-29 RX ORDER — ATORVASTATIN CALCIUM 80 MG/1
80 TABLET, FILM COATED ORAL NIGHTLY
Status: DISCONTINUED | OUTPATIENT
Start: 2022-03-30 | End: 2022-04-02 | Stop reason: HOSPADM

## 2022-03-29 RX ORDER — CLOPIDOGREL BISULFATE 75 MG/1
75 TABLET ORAL DAILY
Status: DISCONTINUED | OUTPATIENT
Start: 2022-03-30 | End: 2022-03-29

## 2022-03-29 RX ORDER — ETOMIDATE 2 MG/ML
INJECTION INTRAVENOUS PRN
Status: DISCONTINUED | OUTPATIENT
Start: 2022-03-29 | End: 2022-03-29 | Stop reason: SDUPTHER

## 2022-03-29 RX ORDER — SODIUM CHLORIDE 9 MG/ML
25 INJECTION, SOLUTION INTRAVENOUS PRN
Status: DISCONTINUED | OUTPATIENT
Start: 2022-03-29 | End: 2022-04-02 | Stop reason: HOSPADM

## 2022-03-29 RX ORDER — MILRINONE LACTATE 0.2 MG/ML
.0625-.75 INJECTION, SOLUTION INTRAVENOUS CONTINUOUS
Status: DISCONTINUED | OUTPATIENT
Start: 2022-03-29 | End: 2022-03-30

## 2022-03-29 RX ORDER — FONDAPARINUX SODIUM 2.5 MG/.5ML
2.5 INJECTION SUBCUTANEOUS DAILY
Status: DISCONTINUED | OUTPATIENT
Start: 2022-03-30 | End: 2022-04-02 | Stop reason: HOSPADM

## 2022-03-29 RX ORDER — KETOROLAC TROMETHAMINE 30 MG/ML
INJECTION, SOLUTION INTRAMUSCULAR; INTRAVENOUS PRN
Status: DISCONTINUED | OUTPATIENT
Start: 2022-03-29 | End: 2022-03-29 | Stop reason: SDUPTHER

## 2022-03-29 RX ORDER — POTASSIUM CHLORIDE 750 MG/1
10 TABLET, EXTENDED RELEASE ORAL
Status: DISCONTINUED | OUTPATIENT
Start: 2022-03-30 | End: 2022-04-02 | Stop reason: HOSPADM

## 2022-03-29 RX ORDER — POTASSIUM CHLORIDE 29.8 MG/ML
20 INJECTION INTRAVENOUS PRN
Status: DISCONTINUED | OUTPATIENT
Start: 2022-03-29 | End: 2022-04-02 | Stop reason: HOSPADM

## 2022-03-29 RX ORDER — DOBUTAMINE HYDROCHLORIDE 200 MG/100ML
2.5-1 INJECTION INTRAVENOUS CONTINUOUS
Status: DISCONTINUED | OUTPATIENT
Start: 2022-03-29 | End: 2022-03-30

## 2022-03-29 RX ORDER — METOPROLOL TARTRATE 5 MG/5ML
2.5 INJECTION INTRAVENOUS EVERY 10 MIN PRN
Status: DISCONTINUED | OUTPATIENT
Start: 2022-03-29 | End: 2022-03-30

## 2022-03-29 RX ORDER — DEXTROSE MONOHYDRATE 25 G/50ML
12.5 INJECTION, SOLUTION INTRAVENOUS PRN
Status: DISCONTINUED | OUTPATIENT
Start: 2022-03-29 | End: 2022-03-29

## 2022-03-29 RX ORDER — SODIUM CHLORIDE, SODIUM LACTATE, POTASSIUM CHLORIDE, CALCIUM CHLORIDE 600; 310; 30; 20 MG/100ML; MG/100ML; MG/100ML; MG/100ML
INJECTION, SOLUTION INTRAVENOUS CONTINUOUS PRN
Status: DISCONTINUED | OUTPATIENT
Start: 2022-03-29 | End: 2022-03-29 | Stop reason: SDUPTHER

## 2022-03-29 RX ORDER — ATORVASTATIN CALCIUM 40 MG/1
40 TABLET, FILM COATED ORAL NIGHTLY
Status: DISCONTINUED | OUTPATIENT
Start: 2022-03-30 | End: 2022-03-29

## 2022-03-29 RX ORDER — CHLORHEXIDINE GLUCONATE 0.12 MG/ML
15 RINSE ORAL 2 TIMES DAILY
Status: DISCONTINUED | OUTPATIENT
Start: 2022-03-29 | End: 2022-04-02 | Stop reason: HOSPADM

## 2022-03-29 RX ORDER — OXYCODONE HYDROCHLORIDE 5 MG/1
10 TABLET ORAL EVERY 4 HOURS PRN
Status: DISCONTINUED | OUTPATIENT
Start: 2022-03-29 | End: 2022-04-02 | Stop reason: HOSPADM

## 2022-03-29 RX ORDER — MAGNESIUM SULFATE IN WATER 40 MG/ML
2000 INJECTION, SOLUTION INTRAVENOUS PRN
Status: DISCONTINUED | OUTPATIENT
Start: 2022-03-29 | End: 2022-04-02 | Stop reason: HOSPADM

## 2022-03-29 RX ORDER — ACETAMINOPHEN 650 MG/1
SUPPOSITORY RECTAL PRN
Status: DISCONTINUED | OUTPATIENT
Start: 2022-03-29 | End: 2022-03-29 | Stop reason: ALTCHOICE

## 2022-03-29 RX ORDER — CISATRACURIUM BESYLATE 2 MG/ML
INJECTION, SOLUTION INTRAVENOUS PRN
Status: DISCONTINUED | OUTPATIENT
Start: 2022-03-29 | End: 2022-03-29 | Stop reason: SDUPTHER

## 2022-03-29 RX ORDER — CLOPIDOGREL BISULFATE 75 MG/1
75 TABLET ORAL DAILY
Status: DISCONTINUED | OUTPATIENT
Start: 2022-03-29 | End: 2022-04-02 | Stop reason: HOSPADM

## 2022-03-29 RX ORDER — MEPERIDINE HYDROCHLORIDE 50 MG/ML
25 INJECTION INTRAMUSCULAR; INTRAVENOUS; SUBCUTANEOUS
Status: ACTIVE | OUTPATIENT
Start: 2022-03-29 | End: 2022-03-29

## 2022-03-29 RX ORDER — DEXAMETHASONE SODIUM PHOSPHATE 4 MG/ML
INJECTION, SOLUTION INTRA-ARTICULAR; INTRALESIONAL; INTRAMUSCULAR; INTRAVENOUS; SOFT TISSUE PRN
Status: DISCONTINUED | OUTPATIENT
Start: 2022-03-29 | End: 2022-03-29 | Stop reason: SDUPTHER

## 2022-03-29 RX ORDER — MIDAZOLAM HYDROCHLORIDE 1 MG/ML
1 INJECTION INTRAMUSCULAR; INTRAVENOUS
Status: DISCONTINUED | OUTPATIENT
Start: 2022-03-29 | End: 2022-03-30

## 2022-03-29 RX ORDER — HEPARIN SODIUM 1000 [USP'U]/ML
INJECTION, SOLUTION INTRAVENOUS; SUBCUTANEOUS PRN
Status: DISCONTINUED | OUTPATIENT
Start: 2022-03-29 | End: 2022-03-29 | Stop reason: SDUPTHER

## 2022-03-29 RX ORDER — AMINOCAPROIC ACID 250 MG/ML
INJECTION, SOLUTION INTRAVENOUS PRN
Status: DISCONTINUED | OUTPATIENT
Start: 2022-03-29 | End: 2022-03-29 | Stop reason: SDUPTHER

## 2022-03-29 RX ADMIN — GLYCOPYRROLATE 0.4 MG: 0.2 INJECTION, SOLUTION INTRAMUSCULAR; INTRAVENOUS at 11:05

## 2022-03-29 RX ADMIN — CISATRACURIUM BESYLATE 30 MG: 2 INJECTION INTRAVENOUS at 07:50

## 2022-03-29 RX ADMIN — SODIUM CHLORIDE, POTASSIUM CHLORIDE, SODIUM LACTATE AND CALCIUM CHLORIDE: 600; 310; 30; 20 INJECTION, SOLUTION INTRAVENOUS at 06:01

## 2022-03-29 RX ADMIN — ONDANSETRON 4 MG: 2 INJECTION INTRAMUSCULAR; INTRAVENOUS at 10:50

## 2022-03-29 RX ADMIN — CISATRACURIUM BESYLATE 10 MG: 2 INJECTION INTRAVENOUS at 08:30

## 2022-03-29 RX ADMIN — PANTOPRAZOLE SODIUM 40 MG: 40 TABLET, DELAYED RELEASE ORAL at 04:55

## 2022-03-29 RX ADMIN — KETOROLAC TROMETHAMINE 60 MG: 30 INJECTION, SOLUTION INTRAMUSCULAR; INTRAVENOUS at 10:50

## 2022-03-29 RX ADMIN — DOBUTAMINE HYDROCHLORIDE 2 MCG/KG/MIN: 200 INJECTION INTRAVENOUS at 10:20

## 2022-03-29 RX ADMIN — FAMOTIDINE 20 MG: 10 INJECTION, SOLUTION INTRAVENOUS at 20:18

## 2022-03-29 RX ADMIN — CHLORHEXIDINE GLUCONATE: 213 SOLUTION TOPICAL at 05:00

## 2022-03-29 RX ADMIN — AMINOCAPROIC ACID 5000 MG: 250 INJECTION, SOLUTION INTRAVENOUS at 09:10

## 2022-03-29 RX ADMIN — SUFENTANIL CITRATE 10 MCG: 50 INJECTION, SOLUTION EPIDURAL; INTRAVENOUS at 08:40

## 2022-03-29 RX ADMIN — SODIUM CHLORIDE 2.1 UNITS/HR: 9 INJECTION, SOLUTION INTRAVENOUS at 08:25

## 2022-03-29 RX ADMIN — DEXTROSE AND SODIUM CHLORIDE: 5; 450 INJECTION, SOLUTION INTRAVENOUS at 12:08

## 2022-03-29 RX ADMIN — ETOMIDATE 5 MG: 2 INJECTION INTRAVENOUS at 08:37

## 2022-03-29 RX ADMIN — DEXAMETHASONE SODIUM PHOSPHATE 12 MG: 4 INJECTION, SOLUTION INTRAMUSCULAR; INTRAVENOUS at 10:50

## 2022-03-29 RX ADMIN — CISATRACURIUM BESYLATE 10 MG: 2 INJECTION INTRAVENOUS at 09:32

## 2022-03-29 RX ADMIN — MIDAZOLAM HYDROCHLORIDE 2 MG: 2 INJECTION, SOLUTION INTRAMUSCULAR; INTRAVENOUS at 07:48

## 2022-03-29 RX ADMIN — Medication 15 ML: at 20:17

## 2022-03-29 RX ADMIN — MORPHINE SULFATE 4 MG: 4 INJECTION, SOLUTION INTRAMUSCULAR; INTRAVENOUS at 20:37

## 2022-03-29 RX ADMIN — MORPHINE SULFATE 4 MG: 4 INJECTION, SOLUTION INTRAMUSCULAR; INTRAVENOUS at 18:27

## 2022-03-29 RX ADMIN — METOPROLOL TARTRATE 12.5 MG: 25 TABLET, FILM COATED ORAL at 04:54

## 2022-03-29 RX ADMIN — SODIUM CHLORIDE, PRESERVATIVE FREE 10 ML: 5 INJECTION INTRAVENOUS at 20:19

## 2022-03-29 RX ADMIN — ASPIRIN 81 MG: 81 TABLET, COATED ORAL at 04:54

## 2022-03-29 RX ADMIN — SODIUM CHLORIDE: 9 INJECTION, SOLUTION INTRAVENOUS at 08:25

## 2022-03-29 RX ADMIN — Medication 15 ML: at 04:55

## 2022-03-29 RX ADMIN — ETOMIDATE 5 MG: 2 INJECTION INTRAVENOUS at 07:48

## 2022-03-29 RX ADMIN — CISATRACURIUM BESYLATE 4 MG: 2 INJECTION INTRAVENOUS at 10:30

## 2022-03-29 RX ADMIN — CLOPIDOGREL BISULFATE 75 MG: 75 TABLET ORAL at 18:22

## 2022-03-29 RX ADMIN — ASPIRIN 325 MG: 325 TABLET, COATED ORAL at 18:22

## 2022-03-29 RX ADMIN — SODIUM CHLORIDE, SODIUM LACTATE, POTASSIUM CHLORIDE, AND CALCIUM CHLORIDE: .6; .31; .03; .02 INJECTION, SOLUTION INTRAVENOUS at 07:45

## 2022-03-29 RX ADMIN — MIDAZOLAM HYDROCHLORIDE 2 MG: 2 INJECTION, SOLUTION INTRAMUSCULAR; INTRAVENOUS at 07:35

## 2022-03-29 RX ADMIN — Medication 4 MG: at 11:06

## 2022-03-29 RX ADMIN — HEPARIN SODIUM 40000 UNITS: 1000 INJECTION, SOLUTION INTRAVENOUS; SUBCUTANEOUS at 09:04

## 2022-03-29 RX ADMIN — CEFAZOLIN 2 G: 10 INJECTION, POWDER, FOR SOLUTION INTRAVENOUS at 08:00

## 2022-03-29 RX ADMIN — MUPIROCIN: 20 OINTMENT TOPICAL at 20:17

## 2022-03-29 RX ADMIN — Medication 1500 MG: at 08:00

## 2022-03-29 RX ADMIN — VANCOMYCIN HYDROCHLORIDE 1500 MG: 10 INJECTION, POWDER, LYOPHILIZED, FOR SOLUTION INTRAVENOUS at 20:09

## 2022-03-29 RX ADMIN — SUFENTANIL CITRATE 20 MCG: 50 INJECTION, SOLUTION EPIDURAL; INTRAVENOUS at 07:48

## 2022-03-29 RX ADMIN — CEFAZOLIN 2000 MG: 10 INJECTION, POWDER, FOR SOLUTION INTRAVENOUS at 16:46

## 2022-03-29 RX ADMIN — SODIUM CHLORIDE 2.82 UNITS/HR: 9 INJECTION, SOLUTION INTRAVENOUS at 12:46

## 2022-03-29 RX ADMIN — SUFENTANIL CITRATE 20 MCG: 50 INJECTION, SOLUTION EPIDURAL; INTRAVENOUS at 08:34

## 2022-03-29 RX ADMIN — MORPHINE SULFATE 2 MG: 2 INJECTION, SOLUTION INTRAMUSCULAR; INTRAVENOUS at 12:33

## 2022-03-29 RX ADMIN — PROTAMINE SULFATE 450 MG: 10 INJECTION, SOLUTION INTRAVENOUS at 10:26

## 2022-03-29 ASSESSMENT — PULMONARY FUNCTION TESTS
PIF_VALUE: 1
PIF_VALUE: 14
PIF_VALUE: 19
PIF_VALUE: 1
PIF_VALUE: 14
PIF_VALUE: 2
PIF_VALUE: 12
PIF_VALUE: 20
PIF_VALUE: 20
PIF_VALUE: 1
PIF_VALUE: 15
PIF_VALUE: 20
PIF_VALUE: 19
PIF_VALUE: 12
PIF_VALUE: 20
PIF_VALUE: 20
PIF_VALUE: 26
PIF_VALUE: 1
PIF_VALUE: 12
PIF_VALUE: 19
PIF_VALUE: 20
PIF_VALUE: 1
PIF_VALUE: 20
PIF_VALUE: 15
PIF_VALUE: 21
PIF_VALUE: 19
PIF_VALUE: 7
PIF_VALUE: 1
PIF_VALUE: 13
PIF_VALUE: 14
PIF_VALUE: 2
PIF_VALUE: 20
PIF_VALUE: 20
PIF_VALUE: 2
PIF_VALUE: 19
PIF_VALUE: 20
PIF_VALUE: 2
PIF_VALUE: 27
PIF_VALUE: 5
PIF_VALUE: 20
PIF_VALUE: 19
PIF_VALUE: 22
PIF_VALUE: 14
PIF_VALUE: 19
PIF_VALUE: 1
PIF_VALUE: 3
PIF_VALUE: 13
PIF_VALUE: 1
PIF_VALUE: 19
PIF_VALUE: 1
PIF_VALUE: 20
PIF_VALUE: 14
PIF_VALUE: 23
PIF_VALUE: 1
PIF_VALUE: 1
PIF_VALUE: 12
PIF_VALUE: 1
PIF_VALUE: 21
PIF_VALUE: 12
PIF_VALUE: 15
PIF_VALUE: 21
PIF_VALUE: 19
PIF_VALUE: 19
PIF_VALUE: 14
PIF_VALUE: 1
PIF_VALUE: 20
PIF_VALUE: 13
PIF_VALUE: 13
PIF_VALUE: 19
PIF_VALUE: 1
PIF_VALUE: 21
PIF_VALUE: 14
PIF_VALUE: 12
PIF_VALUE: 20
PIF_VALUE: 13
PIF_VALUE: 21
PIF_VALUE: 24
PIF_VALUE: 15
PIF_VALUE: 20
PIF_VALUE: 2
PIF_VALUE: 1
PIF_VALUE: 1
PIF_VALUE: 22
PIF_VALUE: 20
PIF_VALUE: 22
PIF_VALUE: 1
PIF_VALUE: 20
PIF_VALUE: 1
PIF_VALUE: 20
PIF_VALUE: 21
PIF_VALUE: 2
PIF_VALUE: 5
PIF_VALUE: 13
PIF_VALUE: 28
PIF_VALUE: 3
PIF_VALUE: 1
PIF_VALUE: 15
PIF_VALUE: 14
PIF_VALUE: 2
PIF_VALUE: 14
PIF_VALUE: 14
PIF_VALUE: 28
PIF_VALUE: 19
PIF_VALUE: 14
PIF_VALUE: 2
PIF_VALUE: 1
PIF_VALUE: 20
PIF_VALUE: 20
PIF_VALUE: 18
PIF_VALUE: 20
PIF_VALUE: 20
PIF_VALUE: 28
PIF_VALUE: 1
PIF_VALUE: 2
PIF_VALUE: 1
PIF_VALUE: 19
PIF_VALUE: 14
PIF_VALUE: 1
PIF_VALUE: 18
PIF_VALUE: 1
PIF_VALUE: 2
PIF_VALUE: 1
PIF_VALUE: 1
PIF_VALUE: 19
PIF_VALUE: 13
PIF_VALUE: 1
PIF_VALUE: 18
PIF_VALUE: 1
PIF_VALUE: 1
PIF_VALUE: 20
PIF_VALUE: 1
PIF_VALUE: 20
PIF_VALUE: 12
PIF_VALUE: 20
PIF_VALUE: 12
PIF_VALUE: 14
PIF_VALUE: 12
PIF_VALUE: 1
PIF_VALUE: 19
PIF_VALUE: 1
PIF_VALUE: 1
PIF_VALUE: 20
PIF_VALUE: 20
PIF_VALUE: 22
PIF_VALUE: 14
PIF_VALUE: 20
PIF_VALUE: 12
PIF_VALUE: 18
PIF_VALUE: 14
PIF_VALUE: 21
PIF_VALUE: 23
PIF_VALUE: 15
PIF_VALUE: 1
PIF_VALUE: 14
PIF_VALUE: 20
PIF_VALUE: 5
PIF_VALUE: 21
PIF_VALUE: 14
PIF_VALUE: 1
PIF_VALUE: 20
PIF_VALUE: 14
PIF_VALUE: 1
PIF_VALUE: 2
PIF_VALUE: 18
PIF_VALUE: 12
PIF_VALUE: 18
PIF_VALUE: 19
PIF_VALUE: 1
PIF_VALUE: 2
PIF_VALUE: 13
PIF_VALUE: 13
PIF_VALUE: 20
PIF_VALUE: 1
PIF_VALUE: 14
PIF_VALUE: 14
PIF_VALUE: 21
PIF_VALUE: 1
PIF_VALUE: 21
PIF_VALUE: 21
PIF_VALUE: 5
PIF_VALUE: 14
PIF_VALUE: 20
PIF_VALUE: 1
PIF_VALUE: 20
PIF_VALUE: 20
PIF_VALUE: 1
PIF_VALUE: 19
PIF_VALUE: 0
PIF_VALUE: 14
PIF_VALUE: 19
PIF_VALUE: 22
PIF_VALUE: 18
PIF_VALUE: 13
PIF_VALUE: 3
PIF_VALUE: 13
PIF_VALUE: 1
PIF_VALUE: 1

## 2022-03-29 ASSESSMENT — PAIN SCALES - GENERAL
PAINLEVEL_OUTOF10: 0
PAINLEVEL_OUTOF10: 0
PAINLEVEL_OUTOF10: 8
PAINLEVEL_OUTOF10: 9
PAINLEVEL_OUTOF10: 10
PAINLEVEL_OUTOF10: 8
PAINLEVEL_OUTOF10: 0

## 2022-03-29 ASSESSMENT — PAIN DESCRIPTION - PAIN TYPE: TYPE: SURGICAL PAIN

## 2022-03-29 ASSESSMENT — PAIN DESCRIPTION - LOCATION: LOCATION: INCISION

## 2022-03-29 NOTE — OP NOTE
Operative Note      Patient: Daryl Wilson  YOB: 1965  MRN: [id number]    Date of Procedure: 3/29/2022    Pre-Op Diagnosis: CAD    Post-Op Diagnosis: Same       Procedure(s):  CORONARY ARTERY BYPASS GRAFT X2, SAPHENOUS VEIN GRAFT, INTERNAL MAMMARY ARTERY, URSZULA, TEMPORARY PACING WIRE PLACEMENT, ON PUMP, LEFT ATRIAL APPENDAGE CLIP PLACEMENT, BILATERAL PECTORALIS BLOCKS AND PLATELET GEL APPLICATION    Surgeon(s):  Cyndie Cleary MD    Assistant:   Surgical Assistant: Fidencio Samayoa    Anesthesia: General    Estimated Blood Loss (mL): 132     Complications: None    Specimens:   * No specimens in log *    Implants:  Implant Name Type Inv. Item Serial No.  Lot No. LRB No. Used Action   DEVICE OCCL CLP L40MM SM FOOTPRINT 1 HND APPL SUTURELESS W/ - [id number]  DEVICE OCCL CLP L40MM SM FOOTPRINT 1 HND APPL SUTURELESS W/  [employer] [id number] N/A 1 Implanted         Drains:   Chest Tube 1 Mediastinal 24 [place] (Active)       Chest Tube 2 Mediastinal  (Active)       Chest Tube 3 Left Pleural 28 [place] (Active)       Urethral Catheter Latex 16 fr (Active)       Findings: 2V bypass, on 2 dobutamine,     Detailed Description of Procedure:   BYPASS GRAFTS:  1. LIMA to LAD  2. SVG to OM1      Crossclamp Time: 47 min  Cardiopulmonary Bypass Time: 58 min    Vein Start time: 832  Vein out time: 914  Vein ready time: 930    Detailed Description of Procedure:   After informed consent was obtained the patient was brought to the operating room and general anesthesia was induced without difficulty. Appropriate lines monitors were placed. Timeout was done confirming correct patient procedure. After sterile prep and drape, the right greater saphenous vein was harvested endoscopically from the thigh to knee. Simultaneously, a median sternotomy incision was made. The sternum was divided with a reciprocating saw after holding ventilations.   Cautery was used to control bleeding and bone edges.  The left mammary artery was harvested in a pedicle fashion. The left pleural space was entered. Systemic heparinization was administered and allowed to circulate for 3 minutes. The mammary was then clipped distally and prepared for bypass grafting. It was then placed in a papaverine soaked Ray-Elan sponge. The sternal retractor was then placed and pericardial well developed. 2 Ethibond double row pursing sutures were placed as well as a 2-0 Ethibond in the right atrial appendage. A 4-0 pledgeted Prolene was placed for antegrade cardioplegia, and a 2-0 ethibond for retorgrade. Ascending aorta was soft and was cannulated in the distal ascending aorta. Dual stage venous cannula was placed. Bypass flow was initiated with good drainage. Patient was allowed to drift in temperature. Flows were then dropped, crossclamp applied and antegrade cardioplegia was administered with rapid cessation of myocardial activity. 500 retrograde was given. Topical ice was then applied as well. The OM1 was a 2 mm artery, intramyocardial.  It was opened sharply and a end-to-side anastomosis was performed with running 7-0 Prolene suture. Was leak tested and found to be satisfactory. The heart was filled and graft was sized. A 4.4 mm punch was then used to make a aortotomy and the proximal anastomosis was constructed using 6-0 Prolene suture. Cardioplegia administered and satisfactory for leak. The left atrial appendage was then sized to a 40 mm. The AtriCure 40 mm V clamp was then applied to the base of the appendage and was satisfactory. A slit was made in the pericardium with care to avoid the phrenic nerve. The mammary artery was passed through this area. The mid LAD was chosen. The mid LAD was then opened sharply and a running 7-0 Prolene suture was used to construct a end to side anastomosis. 6-0 Prolene suture was then used to tack down the mammary fascia on both sides of this.      Reynold

## 2022-03-29 NOTE — PROGRESS NOTES
Shift: [id number]    Patient: Nadeen Banegas  YOB: 1965  MRN: [id number]     Date of Procedure: 3/29/2022     Pre-Op Diagnosis: CAD     Post-Op Diagnosis: Same       Procedure(s):  CORONARY ARTERY BYPASS GRAFT X2, SAPHENOUS VEIN GRAFT, INTERNAL MAMMARY ARTERY, RUSZULA, TEMPORARY PACING WIRE PLACEMENT, ON PUMP, LEFT ATRIAL APPENDAGE CLIP PLACEMENT, BILATERAL PECTORALIS BLOCKS AND PLATELET GEL APPLICATION     Surgeon(s):  Parish Garces MD     Assistant:   Surgical Assistant: Mariela Gao; [facility] assessment at handoff  stable    Rhythm changes Normal Sinus Rhythm     Rhythm Intervention None    Drop in Urinary Output no     Changes to drips?     -Levophed off  -Dobutamine off  -D5 1/2 NS started  -Insulin titrated    POD 1 ONLY: Pacing Wires Removed: []Yes           [] NO        [] Platelets < 53,767        [] Arrhythmia        [] Bradycardia        [] Valve Replacement         [] Pacing for Cardiac Index    LDA Insertion Date Date Changed (if needed) Discontinued Date   Art line 03/29/22     Central Line 03/29/22     ET Tube 03/29/22 03/29/22   Vila 03/29/22     Chest Tube 03/29/22     Leg Drain      Pacing Wires 03/29/22       Surgery, return to OR no     Hospital Course:  DOS  -Levophed off  -Dobutamine off  -Platelets x1 unit given  -Nonrebreather d/c'd and placed on NC  -CT output within limits  -Urine output within limits  -Transitioned at 1814  -PO aspirin and plavix given per MD order  * Dr. Ada Cronin to pull pacing wires during rounds on 3/30/22 *      Changes in O2 requirements Oxygen Therapy  SpO2: 96 %  Pulse Oximeter Device Mode: Continuous  Pulse Oximeter Device Location: Finger  O2 Device: None (Room air)  Skin Assessment: Clean, dry, & intact  Skin Protection for O2 Device: Yes  O2 Flow Rate (L/min): 1 L/min   No increase in oxygen demand    Most recent vitals BP (!) 101/57   Pulse 78   Temp 97.4 °F (36.3 °C) (Bladder)   Resp 16   Ht 5' 11\" (1.803 m)   Wt 186 lb 8.2 oz (84.6 kg)   SpO2 96%   BMI 26.01 kg/m²       Admission weight Weight: 197 lb 15.6 oz (89.8 kg)     Today's weight   Wt Readings from Last 1 Encounters:   03/29/22 186 lb 8.2 oz (84.6 kg)         Lab Data:  CBC:   Recent Labs     03/29/22  1145   WBC 13.2*   HGB 10.6*   HCT 31.6*   MCV 94.3   *     BMP:    Recent Labs     03/29/22  1145 03/29/22  1145 03/29/22  1730   *  --   --    K 4.6   < > 4.4   CO2 23  --   --    BUN 18  --   --    CREATININE 0.9  --   --     < > = values in this interval not displayed. LIVR:   Recent Labs     03/29/22  0409   AST 44*   ALT 38     PT/INR:   Recent Labs     03/29/22  0409 03/29/22  1041 03/29/22  1145   PROT 6.5  --   --    INR 1.07   < > 1.33*    < > = values in this interval not displayed.      APTT:   Recent Labs     03/29/22  1145   APTT 36.8       Drip rates at handoff:    dextrose 5 % and 0.45 % NaCl 75 mL/hr at 03/29/22 1208    sodium chloride      niCARdipine      insulin 1.44 Units/hr (03/29/22 1800)    dextrose      DOBUTamine      milrinone      norepinephrine      dexmedetomidine (PRECEDEX) IV infusion           Electronically signed by Tara Sherman RN on 3/29/22 at 6:15 PM EDT

## 2022-03-29 NOTE — PROGRESS NOTES
03/29/22 1243   RT Protocol   History Pulmonary Disease 1   Respiratory pattern 0   Breath sounds 2   Cough 0   Bronchodilator Assessment Score 3

## 2022-03-29 NOTE — PROGRESS NOTES
Patient prepped for CABG x 2 today at 7:45 am.  Hibiclens bath in evening and morning. Body & legs shaved for procedure. Beta blocker & aspirin given. Lactated Ringer started at 6:00am. Patient has been NOP since midnight. No significant events overnight. Will continue to monitor.     Chuck Shafer RN

## 2022-03-29 NOTE — ANESTHESIA POSTPROCEDURE EVALUATION
Department of Anesthesiology  Postprocedure Note    Patient: Jordan Nuñez  MRN: [id number]  YOB: 1965  Date of evaluation: 3/29/2022  Time:  11:40 AM     Procedure Summary     Date: 03/29/22 Room / Location: [address]    Anesthesia Start: 0745 Anesthesia Stop: 1140    Procedure: CORONARY ARTERY BYPASS GRAFT X2, SAPHENOUS VEIN GRAFT, INTERNAL MAMMARY ARTERY, URSZULA, TEMPORARY PACING WIRE PLACEMENT, ON PUMP, LEFT ATRIAL APPENDAGE CLIP PLACEMENT, BILATERAL PECTORALIS BLOCKS AND PLATELET GEL APPLICATION (N/A Chest) Diagnosis: (CAD)    Surgeons: Chasity Villafuerte MD Responsible Provider: Radha Shay MD    Anesthesia Type: general ASA Status: 4          Anesthesia Type: general    Francisco J Phase I:      Francisco J Phase II:      Last vitals: Reviewed and per EMR flowsheets. Anesthesia Post Evaluation    Patient location during evaluation: ICU  Patient participation: waiting for patient participation  Level of consciousness: awake and lethargic  Pain scale: see nsg notes.   Airway patency: patent  Nausea & Vomiting: no nausea and no vomiting  Complications: no  Cardiovascular status: hemodynamically stable  Respiratory status: acceptable and face mask  Hydration status: stable

## 2022-03-29 NOTE — RT PROTOCOL NOTE
RT Inhaler-Nebulizer Bronchodilator Protocol Note    There is a bronchodilator order in the chart from a provider indicating to follow the RT Bronchodilator Protocol and there is an Initiate RT Inhaler-Nebulizer Bronchodilator Protocol order as well (see protocol at bottom of note). CXR Findings:  XR CHEST PORTABLE    Result Date: 3/29/2022  No pneumothorax status post median sternotomy       The findings from the last RT Protocol Assessment were as follows:   History Pulmonary Disease: Smoker 15 pack years or more  Respiratory Pattern: Regular pattern and RR 12-20 bpm  Breath Sounds: Slightly diminished and/or crackles  Cough: Strong, spontaneous, non-productive  Indication for Bronchodilator Therapy:    Bronchodilator Assessment Score: 3    Aerosolized bronchodilator medication orders have been revised according to the RT Inhaler-Nebulizer Bronchodilator Protocol below. Respiratory Therapist to perform RT Therapy Protocol Assessment initially then follow the protocol. Repeat RT Therapy Protocol Assessment PRN for score 0-3 or on second treatment, BID, and PRN for scores above 3. No Indications - adjust the frequency to every 6 hours PRN wheezing or bronchospasm, if no treatments needed after 48 hours then discontinue using Per Protocol order mode. If indication present, adjust the RT bronchodilator orders based on the Bronchodilator Assessment Score as indicated below. Use Inhaler orders unless patient has one or more of the following: on home nebulizer, not able to hold breath for 10 seconds, is not alert and oriented, cannot activate and use MDI correctly, or respiratory rate 25 breaths per minute or more, then use the equivalent nebulizer order(s) with same Frequency and PRN reasons based on the score. If a patient is on this medication at home then do not decrease Frequency below that used at home.     0-3 - enter or revise RT bronchodilator order(s) to equivalent RT Bronchodilator order with Frequency of every 4 hours PRN for wheezing or increased work of breathing using Per Protocol order mode. 4-6 - enter or revise RT Bronchodilator order(s) to two equivalent RT bronchodilator orders with one order with BID Frequency and one order with Frequency of every 4 hours PRN wheezing or increased work of breathing using Per Protocol order mode. 7-10 - enter or revise RT Bronchodilator order(s) to two equivalent RT bronchodilator orders with one order with TID Frequency and one order with Frequency of every 4 hours PRN wheezing or increased work of breathing using Per Protocol order mode. 11-13 - enter or revise RT Bronchodilator order(s) to one equivalent RT bronchodilator order with QID Frequency and an Albuterol order with Frequency of every 4 hours PRN wheezing or increased work of breathing using Per Protocol order mode. Greater than 13 - enter or revise RT Bronchodilator order(s) to one equivalent RT bronchodilator order with every 4 hours Frequency and an Albuterol order with Frequency of every 2 hours PRN wheezing or increased work of breathing using Per Protocol order mode. RT to enter RT Home Evaluation for COPD & MDI Assessment order using Per Protocol order mode.     Electronically signed by Neeraj Escoto RCP on 3/29/2022 at 12:43 PM

## 2022-03-29 NOTE — PLAN OF CARE
Problem: Falls - Risk of:  Goal: Will remain free from falls  Description: Will remain free from falls  Outcome: Ongoing  Goal: Absence of physical injury  Description: Absence of physical injury  Outcome: Ongoing     Problem: Nutritional:  Goal: Ability to tolerate tube feedings without aspirating will improve  Description: Ability to tolerate tube feedings without aspirating will improve  Outcome: Ongoing  Goal: Consumption of food in small portions  Description: Consumption of food in small portions  Outcome: Ongoing  Goal: Consumption of liquid of appropriate consistency  Description: Consumption of liquid of appropriate consistency  Outcome: Ongoing     Problem: Respiratory:  Goal: Ability to maintain a clear airway will improve  Description: Ability to maintain a clear airway will improve  Outcome: Ongoing  Goal: Will remain free from infection  Description: Will remain free from infection  Outcome: Ongoing  Goal: Absence of aspiration  Description: Absence of aspiration  Outcome: Ongoing     Problem: Safety:  Goal: Ability to chew and swallow food without choking will improve  Description: Ability to chew and swallow food without choking will improve  Outcome: Ongoing  Goal: Ability to demonstrate good, daily oral hygiene techniques will improve  Description: Ability to demonstrate good, daily oral hygiene techniques will improve  Outcome: Ongoing  Goal: Maintenance of upright position during and after feeding  Description: Maintenance of upright position during and after feeding  Outcome: Ongoing     Problem: Discharge Planning:  Goal: Discharged to appropriate level of care  Description: Discharged to appropriate level of care  Outcome: Ongoing     Problem: Pain - Acute:  Goal: Pain level will decrease  Description: Pain level will decrease  Outcome: Ongoing     Problem: Fluid Volume - Imbalance:  Goal: Will show no signs and symptoms of excessive bleeding  Description: Will show no signs and symptoms of excessive bleeding  Outcome: Ongoing  Goal: Absence of imbalanced fluid volume signs and symptoms  Description: Absence of imbalanced fluid volume signs and symptoms  Outcome: Ongoing     Problem: Anxiety:  Goal: Level of anxiety will decrease  Description: Level of anxiety will decrease  Outcome: Ongoing     Problem: Cardiac Output - Decreased:  Goal: Cardiac output within specified parameters  Description: Cardiac output within specified parameters  Outcome: Ongoing     Problem: Tissue Perfusion - Cardiopulmonary, Altered:  Goal: Circulatory function within specified parameters  Description: Circulatory function within specified parameters  Outcome: Ongoing  Goal: Absence of angina  Description: Absence of angina  Outcome: Ongoing  Goal: Hemodynamic stability will improve  Description: Hemodynamic stability will improve  Outcome: Ongoing     Problem: Tissue Perfusion - Peripheral, Altered:  Goal: Absence of hematoma at arterial access site  Description: Absence of hematoma at arterial access site  Outcome: Ongoing  Goal: Circulatory function of lower extremities is within specified parameters  Description: Circulatory function of lower extremities is within specified parameters  Outcome: Ongoing     Problem: Tobacco Use:  Goal: Will participate in inpatient tobacco-use cessation counseling  Description: Will participate in inpatient tobacco-use cessation counseling  Outcome: Ongoing     Vivienne Howard RN

## 2022-03-29 NOTE — PLAN OF CARE
Problem: Falls - Risk of:  Goal: Will remain free from falls  Description: Will remain free from falls  3/29/2022 1354 by Penny Cloud RN  Outcome: Ongoing  3/29/2022 0039 by Gracia Sarmiento RN  Outcome: Ongoing  Goal: Absence of physical injury  Description: Absence of physical injury  3/29/2022 1354 by Penny Cloud RN  Outcome: Ongoing  3/29/2022 0039 by Gracia Sarmiento RN  Outcome: Ongoing     Problem: Nutritional:  Goal: Ability to tolerate tube feedings without aspirating will improve  Description: Ability to tolerate tube feedings without aspirating will improve  3/29/2022 1354 by Penny Cloud RN  Outcome: Ongoing  3/29/2022 0039 by Gracia Sarmiento RN  Outcome: Ongoing  Goal: Consumption of food in small portions  Description: Consumption of food in small portions  3/29/2022 1354 by Penny Cloud RN  Outcome: Ongoing  3/29/2022 0039 by Gracia Sarmiento RN  Outcome: Ongoing  Goal: Consumption of liquid of appropriate consistency  Description: Consumption of liquid of appropriate consistency  3/29/2022 1354 by Penny Cloud RN  Outcome: Ongoing  3/29/2022 0039 by Gracia Sarmiento RN  Outcome: Ongoing     Problem: Respiratory:  Goal: Ability to maintain a clear airway will improve  Description: Ability to maintain a clear airway will improve  3/29/2022 1354 by Penny Cloud RN  Outcome: Ongoing  3/29/2022 0039 by Gracia Sarmiento RN  Outcome: Ongoing  Goal: Will remain free from infection  Description: Will remain free from infection  3/29/2022 1354 by Penny Cloud RN  Outcome: Ongoing  3/29/2022 0039 by Gracia Sarmiento RN  Outcome: Ongoing  Goal: Absence of aspiration  Description: Absence of aspiration  3/29/2022 1354 by Penny Cloud RN  Outcome: Ongoing  3/29/2022 0039 by Gracia Sarmiento RN  Outcome: Ongoing     Problem: Safety:  Goal: Ability to chew and swallow food without choking will improve  Description: Ability to chew and swallow food without choking will improve  3/29/2022 1354 by Penny Cloud RN  Outcome: Ongoing  3/29/2022 0039 by Erica Bourgeois RN  Outcome: Ongoing  Goal: Ability to demonstrate good, daily oral hygiene techniques will improve  Description: Ability to demonstrate good, daily oral hygiene techniques will improve  3/29/2022 1354 by Yudy Guo RN  Outcome: Ongoing  3/29/2022 0039 by Erica Bourgeois RN  Outcome: Ongoing  Goal: Maintenance of upright position during and after feeding  Description: Maintenance of upright position during and after feeding  3/29/2022 1354 by Yudy Guo RN  Outcome: Ongoing  3/29/2022 0039 by Erica Bourgeois RN  Outcome: Ongoing     Problem: Discharge Planning:  Goal: Discharged to appropriate level of care  Description: Discharged to appropriate level of care  3/29/2022 1354 by Yudy Guo RN  Outcome: Ongoing  3/29/2022 0039 by Erica Bourgeois RN  Outcome: Ongoing     Problem: Pain - Acute:  Goal: Pain level will decrease  Description: Pain level will decrease  3/29/2022 1354 by Yudy Guo RN  Outcome: Ongoing  PRN morphine for pain control  3/29/2022 0039 by Erica Bourgeois RN  Outcome: Ongoing     Problem: Fluid Volume - Imbalance:  Goal: Will show no signs and symptoms of excessive bleeding  Description: Will show no signs and symptoms of excessive bleeding  3/29/2022 1354 by Yudy Guo RN  Outcome: Ongoing  3/29/2022 0039 by Erica Bourgeois RN  Outcome: Ongoing  Goal: Absence of imbalanced fluid volume signs and symptoms  Description: Absence of imbalanced fluid volume signs and symptoms  3/29/2022 1354 by Yudy Guo RN  Outcome: Ongoing  3/29/2022 0039 by Erica Bourgeois RN  Outcome: Ongoing     Problem: Anxiety:  Goal: Level of anxiety will decrease  Description: Level of anxiety will decrease  3/29/2022 1354 by Yudy Guo RN  Outcome: Ongoing  Pt resting comfortably  3/29/2022 0039 by Erica Bourgeois RN  Outcome: Ongoing     Problem: Cardiac Output - Decreased:  Goal: Cardiac output within specified parameters  Description: Cardiac output within specified parameters  3/29/2022 1354 by Kwesi Harris RN  Outcome: Ongoing  Cardiac Index = 3.8  3/29/2022 0039 by Cosme Thrasher RN  Outcome: Ongoing     Problem: Tissue Perfusion - Cardiopulmonary, Altered:  Goal: Circulatory function within specified parameters  Description: Circulatory function within specified parameters  3/29/2022 1354 by Kwesi Harris RN  Outcome: Ongoing  3/29/2022 0039 by Cosme Thrasher RN  Outcome: Ongoing  Goal: Absence of angina  Description: Absence of angina  3/29/2022 1354 by Kwesi Harris RN  Outcome: Ongoing  3/29/2022 0039 by Cosme Thrasher RN  Outcome: Ongoing  Goal: Hemodynamic stability will improve  Description: Hemodynamic stability will improve  3/29/2022 1354 by Kwesi Harris RN  Outcome: Ongoing  3/29/2022 0039 by Cosme Thrasher RN  Outcome: Ongoing     Problem: Tissue Perfusion - Peripheral, Altered:  Goal: Absence of hematoma at arterial access site  Description: Absence of hematoma at arterial access site  3/29/2022 1354 by Kwesi Harris RN  Outcome: Ongoing  3/29/2022 0039 by Cosme Thrasher RN  Outcome: Ongoing  Goal: Circulatory function of lower extremities is within specified parameters  Description: Circulatory function of lower extremities is within specified parameters  3/29/2022 1354 by Kwesi Harris RN  Outcome: Ongoing  3/29/2022 0039 by Cosme Thrasher RN  Outcome: Ongoing     Problem: Tobacco Use:  Goal: Will participate in inpatient tobacco-use cessation counseling  Description: Will participate in inpatient tobacco-use cessation counseling  3/29/2022 1354 by Kwesi Harris RN  Outcome: Ongoing  3/29/2022 0039 by Cosme Thrasher RN  Outcome: Ongoing

## 2022-03-29 NOTE — PROGRESS NOTES
Patient admitted to CVU from [place] [address] and attached to nonrebreather and monitors. Report received from anesthesiologist.  Chest x-ray ordered. Labs drawn and sent. Assessment complete. Continue monitoring hemodynamics. Family let back to see patient. Visiting hours reviewed and all questions answered. Primary RN Yesenia Abdul.     RECOVERY PERIOD BEGINS  1130    Electronically signed by Antelmo Miranda RN on 3/29/2022 at 2:03 PM

## 2022-03-30 ENCOUNTER — APPOINTMENT (OUTPATIENT)
Dept: GENERAL RADIOLOGY | Age: 57
DRG: 232 | End: 2022-03-30
Payer: COMMERCIAL

## 2022-03-30 LAB
ANION GAP SERPL CALCULATED.3IONS-SCNC: 10 MMOL/L (ref 3–16)
BASE EXCESS ARTERIAL: -1 (ref -3–3)
BUN BLDV-MCNC: 12 MG/DL (ref 7–20)
CALCIUM IONIZED: 1.15 MMOL/L (ref 1.12–1.32)
CALCIUM SERPL-MCNC: 8.3 MG/DL (ref 8.3–10.6)
CHLORIDE BLD-SCNC: 101 MMOL/L (ref 99–110)
CO2: 23 MMOL/L (ref 21–32)
CREAT SERPL-MCNC: 0.8 MG/DL (ref 0.9–1.3)
GFR AFRICAN AMERICAN: >60
GFR NON-AFRICAN AMERICAN: >60
GLUCOSE BLD-MCNC: 104 MG/DL (ref 70–99)
GLUCOSE BLD-MCNC: 106 MG/DL (ref 70–99)
GLUCOSE BLD-MCNC: 107 MG/DL (ref 70–99)
GLUCOSE BLD-MCNC: 110 MG/DL (ref 70–99)
GLUCOSE BLD-MCNC: 114 MG/DL (ref 70–99)
GLUCOSE BLD-MCNC: 115 MG/DL (ref 70–99)
GLUCOSE BLD-MCNC: 116 MG/DL (ref 70–99)
GLUCOSE BLD-MCNC: 122 MG/DL (ref 70–99)
GLUCOSE BLD-MCNC: 123 MG/DL (ref 70–99)
GLUCOSE BLD-MCNC: 126 MG/DL (ref 70–99)
GLUCOSE BLD-MCNC: 144 MG/DL (ref 70–99)
HCO3 ARTERIAL: 24.1 MMOL/L (ref 21–29)
HCT VFR BLD CALC: 33.4 % (ref 40.5–52.5)
HEMOGLOBIN: 10.9 GM/DL (ref 13.5–17.5)
HEMOGLOBIN: 11.2 G/DL (ref 13.5–17.5)
LACTATE: 0.78 MMOL/L (ref 0.4–2)
MAGNESIUM: 2.2 MG/DL (ref 1.8–2.4)
MCH RBC QN AUTO: 31.3 PG (ref 26–34)
MCHC RBC AUTO-ENTMCNC: 33.5 G/DL (ref 31–36)
MCV RBC AUTO: 93.4 FL (ref 80–100)
O2 SAT, ARTERIAL: 94 % (ref 93–100)
PCO2 ARTERIAL: 41.7 MM HG (ref 35–45)
PDW BLD-RTO: 12.9 % (ref 12.4–15.4)
PERFORMED ON: ABNORMAL
PH ARTERIAL: 7.37 (ref 7.35–7.45)
PLATELET # BLD: 236 K/UL (ref 135–450)
PMV BLD AUTO: 8.6 FL (ref 5–10.5)
PO2 ARTERIAL: 73 MM HG (ref 75–108)
POC HEMATOCRIT: 32 % (ref 40.5–52.5)
POC POTASSIUM: 4.7 MMOL/L (ref 3.5–5.1)
POC SAMPLE TYPE: ABNORMAL
POC SODIUM: 137 MMOL/L (ref 136–145)
POTASSIUM SERPL-SCNC: 4.5 MMOL/L (ref 3.5–5.1)
RBC # BLD: 3.58 M/UL (ref 4.2–5.9)
SODIUM BLD-SCNC: 134 MMOL/L (ref 136–145)
TCO2 ARTERIAL: 25 MMOL/L
WBC # BLD: 17.4 K/UL (ref 4–11)

## 2022-03-30 PROCEDURE — 84132 ASSAY OF SERUM POTASSIUM: CPT

## 2022-03-30 PROCEDURE — 6360000002 HC RX W HCPCS: Performed by: STUDENT IN AN ORGANIZED HEALTH CARE EDUCATION/TRAINING PROGRAM

## 2022-03-30 PROCEDURE — 85027 COMPLETE CBC AUTOMATED: CPT

## 2022-03-30 PROCEDURE — 2140000000 HC CCU INTERMEDIATE R&B

## 2022-03-30 PROCEDURE — 99024 POSTOP FOLLOW-UP VISIT: CPT | Performed by: NURSE PRACTITIONER

## 2022-03-30 PROCEDURE — 71045 X-RAY EXAM CHEST 1 VIEW: CPT

## 2022-03-30 PROCEDURE — 82330 ASSAY OF CALCIUM: CPT

## 2022-03-30 PROCEDURE — 94761 N-INVAS EAR/PLS OXIMETRY MLT: CPT

## 2022-03-30 PROCEDURE — 85014 HEMATOCRIT: CPT

## 2022-03-30 PROCEDURE — 2580000003 HC RX 258: Performed by: INTERNAL MEDICINE

## 2022-03-30 PROCEDURE — 82947 ASSAY GLUCOSE BLOOD QUANT: CPT

## 2022-03-30 PROCEDURE — 6370000000 HC RX 637 (ALT 250 FOR IP): Performed by: NURSE PRACTITIONER

## 2022-03-30 PROCEDURE — 2700000000 HC OXYGEN THERAPY PER DAY

## 2022-03-30 PROCEDURE — 83735 ASSAY OF MAGNESIUM: CPT

## 2022-03-30 PROCEDURE — 97167 OT EVAL HIGH COMPLEX 60 MIN: CPT

## 2022-03-30 PROCEDURE — 99232 SBSQ HOSP IP/OBS MODERATE 35: CPT | Performed by: NURSE PRACTITIONER

## 2022-03-30 PROCEDURE — 2580000003 HC RX 258: Performed by: STUDENT IN AN ORGANIZED HEALTH CARE EDUCATION/TRAINING PROGRAM

## 2022-03-30 PROCEDURE — 82803 BLOOD GASES ANY COMBINATION: CPT

## 2022-03-30 PROCEDURE — 6370000000 HC RX 637 (ALT 250 FOR IP): Performed by: THORACIC SURGERY (CARDIOTHORACIC VASCULAR SURGERY)

## 2022-03-30 PROCEDURE — 6370000000 HC RX 637 (ALT 250 FOR IP): Performed by: INTERNAL MEDICINE

## 2022-03-30 PROCEDURE — 97530 THERAPEUTIC ACTIVITIES: CPT

## 2022-03-30 PROCEDURE — 97116 GAIT TRAINING THERAPY: CPT

## 2022-03-30 PROCEDURE — 6370000000 HC RX 637 (ALT 250 FOR IP): Performed by: STUDENT IN AN ORGANIZED HEALTH CARE EDUCATION/TRAINING PROGRAM

## 2022-03-30 PROCEDURE — 97162 PT EVAL MOD COMPLEX 30 MIN: CPT

## 2022-03-30 PROCEDURE — 80048 BASIC METABOLIC PNL TOTAL CA: CPT

## 2022-03-30 PROCEDURE — 83605 ASSAY OF LACTIC ACID: CPT

## 2022-03-30 PROCEDURE — 84295 ASSAY OF SERUM SODIUM: CPT

## 2022-03-30 RX ORDER — FAMOTIDINE 20 MG/1
20 TABLET, FILM COATED ORAL 2 TIMES DAILY
Status: DISCONTINUED | OUTPATIENT
Start: 2022-03-30 | End: 2022-04-02 | Stop reason: HOSPADM

## 2022-03-30 RX ORDER — KETOROLAC TROMETHAMINE 30 MG/ML
15 INJECTION, SOLUTION INTRAMUSCULAR; INTRAVENOUS EVERY 6 HOURS
Status: COMPLETED | OUTPATIENT
Start: 2022-03-30 | End: 2022-03-31

## 2022-03-30 RX ORDER — GABAPENTIN 100 MG/1
100 CAPSULE ORAL 3 TIMES DAILY
Status: DISCONTINUED | OUTPATIENT
Start: 2022-03-30 | End: 2022-04-02 | Stop reason: HOSPADM

## 2022-03-30 RX ADMIN — MORPHINE SULFATE 2 MG: 2 INJECTION, SOLUTION INTRAMUSCULAR; INTRAVENOUS at 04:41

## 2022-03-30 RX ADMIN — FAMOTIDINE 20 MG: 20 TABLET, FILM COATED ORAL at 11:11

## 2022-03-30 RX ADMIN — Medication 1 TABLET: at 11:10

## 2022-03-30 RX ADMIN — POTASSIUM CHLORIDE 20 MEQ: 29.8 INJECTION, SOLUTION INTRAVENOUS at 00:04

## 2022-03-30 RX ADMIN — DEXTROSE AND SODIUM CHLORIDE: 5; 450 INJECTION, SOLUTION INTRAVENOUS at 00:04

## 2022-03-30 RX ADMIN — ASPIRIN 81 MG: 81 TABLET, COATED ORAL at 11:10

## 2022-03-30 RX ADMIN — Medication 400 MG: at 20:13

## 2022-03-30 RX ADMIN — ACETAMINOPHEN 650 MG: 325 TABLET ORAL at 11:10

## 2022-03-30 RX ADMIN — CEFAZOLIN 2000 MG: 10 INJECTION, POWDER, FOR SOLUTION INTRAVENOUS at 00:05

## 2022-03-30 RX ADMIN — SODIUM CHLORIDE, PRESERVATIVE FREE 10 ML: 5 INJECTION INTRAVENOUS at 20:13

## 2022-03-30 RX ADMIN — KETOROLAC TROMETHAMINE 15 MG: 30 INJECTION, SOLUTION INTRAMUSCULAR; INTRAVENOUS at 15:03

## 2022-03-30 RX ADMIN — Medication 15 ML: at 20:15

## 2022-03-30 RX ADMIN — VANCOMYCIN HYDROCHLORIDE 1500 MG: 10 INJECTION, POWDER, LYOPHILIZED, FOR SOLUTION INTRAVENOUS at 13:24

## 2022-03-30 RX ADMIN — GABAPENTIN 100 MG: 100 CAPSULE ORAL at 21:51

## 2022-03-30 RX ADMIN — VANCOMYCIN HYDROCHLORIDE 1500 MG: 10 INJECTION, POWDER, LYOPHILIZED, FOR SOLUTION INTRAVENOUS at 20:12

## 2022-03-30 RX ADMIN — OXYCODONE 10 MG: 5 TABLET ORAL at 06:39

## 2022-03-30 RX ADMIN — ATORVASTATIN CALCIUM 80 MG: 80 TABLET, FILM COATED ORAL at 20:13

## 2022-03-30 RX ADMIN — KETOROLAC TROMETHAMINE 15 MG: 30 INJECTION, SOLUTION INTRAMUSCULAR; INTRAVENOUS at 20:13

## 2022-03-30 RX ADMIN — MUPIROCIN: 20 OINTMENT TOPICAL at 20:15

## 2022-03-30 RX ADMIN — KETOROLAC TROMETHAMINE 15 MG: 30 INJECTION, SOLUTION INTRAMUSCULAR; INTRAVENOUS at 08:23

## 2022-03-30 RX ADMIN — FAMOTIDINE 20 MG: 20 TABLET, FILM COATED ORAL at 20:13

## 2022-03-30 RX ADMIN — Medication 400 MG: at 11:10

## 2022-03-30 RX ADMIN — POTASSIUM CHLORIDE 10 MEQ: 10 TABLET, EXTENDED RELEASE ORAL at 11:12

## 2022-03-30 RX ADMIN — GABAPENTIN 100 MG: 100 CAPSULE ORAL at 18:18

## 2022-03-30 RX ADMIN — FUROSEMIDE 20 MG: 10 INJECTION, SOLUTION INTRAMUSCULAR; INTRAVENOUS at 05:34

## 2022-03-30 RX ADMIN — CLOPIDOGREL BISULFATE 75 MG: 75 TABLET ORAL at 11:09

## 2022-03-30 RX ADMIN — FUROSEMIDE 20 MG: 10 INJECTION, SOLUTION INTRAMUSCULAR; INTRAVENOUS at 18:29

## 2022-03-30 RX ADMIN — CEFAZOLIN 2000 MG: 10 INJECTION, POWDER, FOR SOLUTION INTRAVENOUS at 18:23

## 2022-03-30 RX ADMIN — CEFAZOLIN 2000 MG: 10 INJECTION, POWDER, FOR SOLUTION INTRAVENOUS at 23:41

## 2022-03-30 RX ADMIN — POTASSIUM CHLORIDE 10 MEQ: 10 TABLET, EXTENDED RELEASE ORAL at 15:09

## 2022-03-30 RX ADMIN — GABAPENTIN 100 MG: 100 CAPSULE ORAL at 11:10

## 2022-03-30 RX ADMIN — FUROSEMIDE 20 MG: 10 INJECTION, SOLUTION INTRAMUSCULAR; INTRAVENOUS at 13:24

## 2022-03-30 RX ADMIN — MORPHINE SULFATE 4 MG: 4 INJECTION, SOLUTION INTRAMUSCULAR; INTRAVENOUS at 01:35

## 2022-03-30 RX ADMIN — MUPIROCIN: 20 OINTMENT TOPICAL at 13:32

## 2022-03-30 RX ADMIN — POTASSIUM CHLORIDE 10 MEQ: 10 TABLET, EXTENDED RELEASE ORAL at 18:18

## 2022-03-30 RX ADMIN — CEFAZOLIN 2000 MG: 10 INJECTION, POWDER, FOR SOLUTION INTRAVENOUS at 11:24

## 2022-03-30 RX ADMIN — Medication 15 ML: at 13:32

## 2022-03-30 RX ADMIN — OXYCODONE 10 MG: 5 TABLET ORAL at 11:10

## 2022-03-30 ASSESSMENT — PAIN DESCRIPTION - LOCATION
LOCATION: INCISION
LOCATION: CHEST;STERNUM
LOCATION: INCISION
LOCATION: CHEST

## 2022-03-30 ASSESSMENT — PAIN DESCRIPTION - PAIN TYPE
TYPE: SURGICAL PAIN

## 2022-03-30 ASSESSMENT — PAIN SCALES - GENERAL
PAINLEVEL_OUTOF10: 9
PAINLEVEL_OUTOF10: 9
PAINLEVEL_OUTOF10: 10
PAINLEVEL_OUTOF10: 10
PAINLEVEL_OUTOF10: 8
PAINLEVEL_OUTOF10: 6

## 2022-03-30 ASSESSMENT — PAIN DESCRIPTION - DESCRIPTORS: DESCRIPTORS: ACHING;SORE;CONSTANT

## 2022-03-30 ASSESSMENT — PAIN DESCRIPTION - ORIENTATION: ORIENTATION: MID

## 2022-03-30 ASSESSMENT — PAIN DESCRIPTION - FREQUENCY: FREQUENCY: CONTINUOUS

## 2022-03-30 NOTE — PROGRESS NOTES
Shift: [id number]    Patient: Krystal Cummings  YOB: 1965  MRN: [id number]     Date of Procedure: 3/29/2022     Pre-Op Diagnosis: CAD     Post-Op Diagnosis: Same       Procedure(s):  CORONARY ARTERY BYPASS GRAFT X2, SAPHENOUS VEIN GRAFT, INTERNAL MAMMARY ARTERY, URSZULA, TEMPORARY PACING WIRE PLACEMENT, ON PUMP, LEFT ATRIAL APPENDAGE CLIP PLACEMENT, BILATERAL PECTORALIS BLOCKS AND PLATELET GEL APPLICATION     Surgeon(s):  Meron Gonzalez MD     Assistant:   Surgical Assistant: Lucy Limon; [facility] assessment at handoff  stable    Rhythm changes Normal Sinus Rhythm     Rhythm Intervention None    Drop in Urinary Output no     Changes to drips?   Insulin off      LDA Insertion Date Date Changed (if needed) Discontinued Date   Art line 03/29/22     Central Line 03/29/22     ET Tube 03/29/22 03/29/22   [place] 03/29/22     Chest Tube 03/29/22     Leg Drain      Pacing Wires 03/29/22  3/30/22     Surgery, return to OR no     Hospital Course:  DOS  -Levophed off  -Dobutamine off  -Platelets x1 unit given  -Nonrebreather d/c'd and placed on NC  -CT output within limits  -Urine output within limits  -Transitioned at 1814  -PO aspirin and plavix given per MD order  * Dr. Aubrie Esparza to pull pacing wires during rounds on 3/30/22 *    POD#1  OOB to chair x 2, PT/OT, minimal CT output, Insulin gtt off      Changes in O2 requirements Oxygen Therapy  SpO2: 97 %  Pulse Oximeter Device Mode: Continuous  Pulse Oximeter Device Location: Finger  O2 Device: Nasal cannula  Skin Assessment: Clean, dry, & intact  Skin Protection for O2 Device: Yes  O2 Flow Rate (L/min): 1 L/min   No increase in oxygen demand    Most recent vitals /63   Pulse 81   Temp 98.9 °F (37.2 °C) (Oral)   Resp 20   Ht 5' 11\" (1.803 m)   Wt 186 lb 8.2 oz (84.6 kg)   SpO2 97%   BMI 26.01 kg/m²       Admission weight Weight: 197 lb 15.6 oz (89.8 kg)     Today's weight   Wt Readings from Last 1 Encounters:   03/29/22 186 lb 8.2 oz (84.6 kg)         Lab Data:  CBC:   Recent Labs     03/30/22  0525   WBC 17.4*   HGB 11.2*   HCT 33.4*   MCV 93.4        BMP:    Recent Labs     03/30/22  0525   *   K 4.5   CO2 23   BUN 12   CREATININE 0.8*     LIVR:   Recent Labs     03/29/22  0409   AST 44*   ALT 38     PT/INR:   Recent Labs     03/29/22  0409 03/29/22  1041 03/29/22  1145   PROT 6.5  --   --    INR 1.07   < > 1.33*    < > = values in this interval not displayed.      APTT:   Recent Labs     03/29/22  1145   APTT 36.8       Drip rates at handoff:    sodium chloride      dextrose           Electronically signed by Shaheed Lynn RN on 3/30/2022 at 7:59 PM

## 2022-03-30 NOTE — PROGRESS NOTES
Physical Therapy    Facility/Department: [facility] C2 CARD TELEMETRY  Initial Assessment    NAME: Zheng Garsia  : 1965  MRN: [id number]    Date of Service: 3/30/2022    Discharge Recommendations:  24 hour supervision or assist   PT Equipment Recommendations  Other: Anticipate no DME needs at D/C    Assessment   Assessment: Pt referred for PT evaluation during current hospital stay s/p CABG x 2 surgery on 3/29/22. Pt currently functioning below his baseline, requiring CGA x 1 for safe transfers and amb x short distance from chair to bed. Amb distance limited to ~4-5 feet today given high # of lines still attached to pt on POD #1. Given pt's young age and (I) baseline LOF, anticipate no barriers to pt returning directly home from hospital.  Recommend initial 24-hr sup/assist from family for safety (will continue to assess for possible home PT needs). Treatment Diagnosis: Decreased endurance and (I) with mobility  Specific instructions for Next Treatment: Progress ther ex and mobility as tolerated  Prognosis: Good  Decision Making: High Complexity  PT Education: Goals; General Safety;Gait Training;PT Role;Plan of Care;Disease Specific Education; Functional Mobility Training;Precautions;Transfer Training;Energy Conservation;Weight-bearing Education  Patient Education: Disease-specific education: Pt educated on sternal precautions with transfers, role of PT in hospital, and safety in transfers/amb - pt verbalizes understanding. REQUIRES PT FOLLOW UP: Yes  Activity Tolerance: Patient Tolerated treatment well;Patient limited by pain; Patient limited by endurance  Activity Tolerance: Vitals during session on 2L O2: BP = 105/54, HR = 85 bpm, O2 sat = 97-98%. Patient Diagnosis(es): The encounter diagnosis was ST elevation myocardial infarction (STEMI), unspecified artery ([facility] [address] has no past medical history on file.    has a past surgical history that includes Coronary artery bypass graft (N/A, Short term goals: 1 week, 4/06/22 (unless otherwise specified)  Short term goal 1: Pt will transfer supine <-> sit with supervision  Short term goal 2: Pt will transfer sit <-> stand and bed>chair with modified(I)  Short term goal 3: Pt will ambulate x 300 feet without AD with modified(I)  Short term goal 4: Pt will ambulate up/down 4 steps using 1 handrail as needed with modified(I)  Short term goal 5: By 4/03/22: Pt will tolerate 12-15 reps BLE exercise for strengthening, balance, and endurance  Patient Goals   Patient goals : \"To be able to walk again and go home\"       Therapy Time   Individual Concurrent Group Co-treatment   Time In 1415         Time Out 1456         Minutes 41         Timed Code Treatment Minutes: [address] [place], [place] #[id number]    If pt is unable to be seen after this session, please let this note serve as discharge summary. Please see case management note for discharge disposition. Thank you.

## 2022-03-30 NOTE — PROGRESS NOTES
Occupational Therapy   Occupational Therapy Initial Assessment/treatment  Date: 3/30/2022   Patient Name: Ahmet Schaefer  MRN: [id number]     : 1965    Date of Service: 3/30/2022    Discharge Recommendations:  24 hour supervision or assist       Assessment   Performance deficits / Impairments: Decreased functional mobility ; Decreased ADL status; Decreased safe awareness;Decreased endurance  Assessment: Patient evaluated s/p CABG x 2  on 3/29/22 Pt IPTA working full time in [employer] [facility] [employer], lives with spouse. Today patient anxious of activity d/t pain, however pt was min-CGA for transfers d/t extensive lines, maxA for LE ADLs and mod of 2 for bed mobility. Pt will continue to benefit from skilled OT services while in acute care as pt functioning below baseline post-op. Anticipate home with 24hr assist.  Prognosis: Good  Decision Making: High Complexity  OT Education: OT Role;Transfer Training;Plan of Care;ADL Adaptive Strategies;Precautions; Equipment  Patient Education: Disease Specific Education: Pt educated on sternal precautions, safe mobility, energy conservation, and task modification. Pt verbalized understanding-needs reinforcement  REQUIRES OT FOLLOW UP: Yes  Activity Tolerance  Activity Tolerance: Patient Tolerated treatment well;Patient limited by pain  Activity Tolerance: Vitals: 96% on 2L, Abp 105/54, HR 88 bpm  Safety Devices  Safety Devices in place: Yes  Type of devices: Gait belt;Call light within reach; Left in bed;Nurse notified           Patient Diagnosis(es): The encounter diagnosis was ST elevation myocardial infarction (STEMI), unspecified artery ([facility] [address] has no past medical history on file. has a past surgical history that includes Coronary artery bypass graft (N/A, 3/29/2022).            Restrictions  Restrictions/Precautions  Restrictions/Precautions: Cardiac,Fall Risk  Position Activity Restriction  Sternal Precautions: No Pushing,No Pulling,5# Lifting Restrictions  Other position/activity restrictions: A-line, 2 chest tubes, telelmetry, 2L O2, walker    Subjective   General  Chart Reviewed: Yes,Orders,Progress Notes,Operative Notes,History and Physical  Patient assessed for rehabilitation services?: Yes  Family / Caregiver Present: No  Referring Practitioner: Flakito Smyth MD 3/30/22  Diagnosis: s/p CABG x 2  Subjective  Subjective: Pt with erratic conversation, hard to stay focus. Patient Currently in Pain: Yes  Pain Assessment  Pain Assessment: 0-10  Pain Level: 9  Pain Type: Surgical pain  Pain Location: Chest;Sternum  Pain Orientation: Mid  Pain Descriptors: Aching;Sore;Constant  Pain Frequency: Continuous  Non-Pharmaceutical Pain Intervention(s): Ambulation/Increased Activity; Emotional support;Repositioned;Distraction  Vital Signs  Pulse: 85  Patient Currently in Pain: Yes  Oxygen Therapy  SpO2: 97 %  Social/Functional History  Social/Functional History  Lives With: Spouse  Type of Home: House  Home Layout: Two level  Home Access: Stairs to enter without rails  Entrance Stairs - Number of Steps: 2 RAO  Bathroom Shower/Tub: Tub/Shower unit,Walk-in shower  Bathroom Toilet: Standard  Bathroom Equipment: Shower chair  Home Equipment:  (no home equipment)  ADL Assistance: Independent  Homemaking Assistance: Independent  Ambulation Assistance: Independent  Transfer Assistance: Independent  Active : Yes  Occupation: Full time employment  Type of occupation: Landscaping       Objective   Vision: Within Functional Limits  Hearing: Within functional limits    Orientation  Overall Orientation Status: Within Functional Limits  Observation/Palpation  Posture: Fair  Balance  Sitting Balance: Stand by assistance  Standing Balance: Minimal assistance  ADL  UE Dressing: Maximum assistance  LE Dressing: Maximum assistance  Toileting: Dependent/Total     Tone RUE  RUE Tone: Normotonic  Tone LUE  LUE Tone: Normotonic  Coordination  Movements Are Fluid And Coordinated: Yes     Bed mobility  Supine to Sit: Unable to assess (up in chair upon entry)  Sit to Supine: Moderate assistance;2 Person assistance  Transfers  Stand Step Transfers: Contact guard assistance (chair to bed)  Sit to stand: Contact guard assistance  Stand to sit: Contact guard assistance     Cognition  Overall Cognitive Status: Exceptions  Following Commands: Follows multistep commands with increased time; Follows multistep commands with repitition  Attention Span: Difficulty attending to directions; Difficulty dividing attention; Attends with cues to redirect  Safety Judgement: Decreased awareness of need for assistance  Problem Solving: Decreased awareness of errors  Insights: Decreased awareness of deficits  Initiation: Requires cues for some  Sequencing: Requires cues for some     LUE AROM (degrees)  LUE AROM : WFL  RUE AROM (degrees)  RUE AROM : WFL  LUE Strength  Gross LUE Strength: WFL  RUE Strength  Gross RUE Strength: WFL            Plan   Plan  Times per week: 4-6x's a week while in acute care      AM-PAC Score        AM-[facility] Inpatient Daily Activity Raw Score: 12 (03/30/22 1542)  AM-PAC Inpatient ADL T-Scale Score : 30.6 (03/30/22 1542)  ADL Inpatient CMS 0-100% Score: 66.57 (03/30/22 1542)  ADL Inpatient CMS G-Code Modifier : CL (03/30/22 1542)    Goals  Short term goals  Time Frame for Short term goals: 1 week 4/06  Short term goal 1: Pt will complete toilet transfers with SBA by 4/03  Short term goal 2: Pt will complete LE dressing with CGA  Short term goal 3: Pt will complete standing level ADLs with SBA  Short term goal 4: Pt will verbalize understanding of 3 of 3 sternal precautions. Patient Goals   Patient goals : \"to go home\"       Therapy Time   Individual Concurrent Group Co-treatment   Time In 8845         Time Out 1456         Minutes 41         Timed Code Treatment Minutes: 31 Minutes       Weston Reid, OTR/L  If pt is unable to be seen after this session, please let this note serve as discharge summary. Please see case management note for discharge disposition. Thank you.

## 2022-03-30 NOTE — PLAN OF CARE
Problem: Falls - Risk of:  Goal: Will remain free from falls  Description: Will remain free from falls  Outcome: Ongoing     Problem: Respiratory:  Goal: Will remain free from infection  Description: Will remain free from infection  Outcome: Ongoing     Problem: Tissue Perfusion - Cardiopulmonary, Altered:  Goal: Absence of angina  Description: Absence of angina  Outcome: Ongoing

## 2022-03-30 NOTE — PROGRESS NOTES
[facility] [address]   Daily Progress Note    Admit Date:  3/24/2022  HPI:    Chief Complaint   Patient presents with    Chest Pain     Pain started at approximately 1000 this AM.  Pt has hx of smoking cigarettes daily. Pt reports no medical or surgical hx. Pt reports no allergies. 324 ASA administered by EMS prior to arrival.  IV in [facility] placed by EMS prior to arrival.      Vinh Emmanuel presented 3/24/2022 for chest pain and found to have inferior STEMI. LHC showed 100% RCA treated with DANIA. Noted to have LM disease, CT surgery consulted, s/p CABG 3/29/2022. Echo showed EF 40-45%. Subjective:  Mr. Pollack Shown sitting up in bed, complains of pain all over. Frustrated.      Objective:   Patient Vitals for the past 24 hrs:   BP Temp Temp src Pulse Resp SpO2   03/30/22 0645 -- -- -- 88 27 94 %   03/30/22 0630 -- -- -- 91 21 94 %   03/30/22 0600 -- 99.2 °F (37.3 °C) Bladder 85 22 94 %   03/30/22 0530 -- -- -- 84 21 94 %   03/30/22 0500 -- -- -- 83 17 96 %   03/30/22 0400 -- 98.3 °F (36.8 °C) Bladder 84 21 96 %   03/30/22 0300 -- -- -- 81 14 95 %   03/30/22 0230 -- -- -- 80 13 96 %   03/30/22 0200 -- -- -- 79 14 96 %   03/30/22 0130 -- -- -- 81 16 96 %   03/30/22 0100 -- -- -- 80 15 96 %   03/30/22 0000 -- 98.2 °F (36.8 °C) Bladder 80 14 96 %   03/29/22 2330 -- -- -- 84 9 95 %   03/29/22 2300 -- -- -- 80 11 95 %   03/29/22 2230 -- -- -- 79 14 96 %   03/29/22 2200 -- -- -- 79 18 95 %   03/29/22 2130 -- 97.9 °F (36.6 °C) Bladder 80 14 96 %   03/29/22 2100 -- -- -- 78 14 96 %   03/29/22 2030 -- -- -- 75 18 95 %   03/29/22 2000 -- 97.8 °F (36.6 °C) Bladder 76 14 95 %   03/29/22 1830 113/63 97.5 °F (36.4 °C) Bladder 78 20 96 %   03/29/22 1800 -- -- -- 78 16 96 %   03/29/22 1730 (!) 106/59 -- -- 79 17 95 %   03/29/22 1700 -- -- -- 72 23 98 %   03/29/22 1630 (!) 101/57 97.4 °F (36.3 °C) Bladder 76 13 96 %   03/29/22 1600 (!) 102/57 -- -- 78 13 97 %   03/29/22 1530 (!) 100/56 -- -- 79 13 97 %   03/29/22 1500 (!) 99/56 97.3 °F (36.3 °C) Bladder 80 18 96 %   03/29/22 1430 (!) 103/56 -- -- 77 13 96 %   03/29/22 1400 (!) 103/57 -- -- 79 16 97 %   03/29/22 1330 (!) 100/54 -- -- 75 13 96 %   03/29/22 1300 (!) 97/50 -- -- 73 15 95 %   03/29/22 1245 (!) 94/47 -- -- 74 16 95 %   03/29/22 1230 (!) 81/44 -- -- 80 18 96 %   03/29/22 1215 (!) 98/50 -- -- 70 18 95 %   03/29/22 1211 (!) 103/54 96.6 °F (35.9 °C) -- 71 14 95 %   03/29/22 1200 (!) 109/53 -- -- 76 14 95 %   03/29/22 1145 120/60 -- -- 76 17 99 %   03/29/22 1130 125/71 96.5 °F (35.8 °C) Bladder 76 24 100 %       Intake/Output Summary (Last 24 hours) at 3/30/2022 0856  Last data filed at 3/30/2022 0827  Gross per 24 hour   Intake 3024 ml   Output 3392 ml   Net -368 ml     Wt Readings from Last 3 Encounters:   03/29/22 186 lb 8.2 oz (84.6 kg)         ASSESSMENT:   1. STEMI: s/p PCI to RCA emergently  2. CAD: s/p PCI to RCA, s/p CABG X3 3/29/2022, on DAPT, high intensity statin, beta-blocker, hemodynamically stable  3. ISCHEMIC CARDIOMYOPATHY: EF 40-45%, on metoprolol postop. Consider transition to Toprol-XL versus carvedilol at discharge; consider also adding ACEi/ARB/ARNI at discharge if able  4. HLD: , on statin  5. Tobacco abuse: encouraged cessation, states is done with smoking      PLAN:  1. Continue DAPT, statin, beta-blocker  2. Diuresis per CT surgery  3. Consider adding ACEi/ARB/ARNI and transitioning to evidence-based beta blocker at discharge if able  4. Nothing further to contribute from cardiology perspective. Will sign off. Will arrange an appointment in 1 month with [facility] at [facility] office location.      JESSICA Grimaldo CNP, 3/30/2022, 8:56 AM  [facility]   [phone]       Telemetry: SR 70-80  NYHA: III    Physical Exam:  General:  Awake, alert, NAD  Skin:  Warm and dry  Neck:  JVP unable to assess  Chest: Clear to auscultation anteriorly  Cardiovascular:  RRR, normal S1S2, + rub, no MG  Abdomen:  Soft, nontender, +bowel sounds  Extremities: No BLE edema      Medications:    gabapentin  100 mg Oral TID    ketorolac  15 mg IntraVENous Q6H    insulin lispro  0-3 Units SubCUTAneous Nightly    insulin lispro  0-6 Units SubCUTAneous TID     famotidine  20 mg Oral BID    fondaparinux  2.5 mg SubCUTAneous Daily    aspirin  81 mg Oral Daily    chlorhexidine  15 mL Mouth/Throat BID    furosemide  20 mg IntraVENous 4 times per day    magnesium oxide  400 mg Oral BID    mupirocin   Nasal BID    potassium chloride  10 mEq Oral TID     metoprolol tartrate  12.5 mg Oral BID    ceFAZolin (ANCEF) IVPB  2,000 mg IntraVENous Q8H    vancomycin (VANCOCIN) IV  1,500 mg IntraVENous Q12H    atorvastatin  80 mg Oral Nightly    clopidogrel  75 mg Oral Daily    bisacodyl  10 mg Rectal Once    sodium chloride flush  5-40 mL IntraVENous 2 times per day    multivitamin  1 tablet Oral Daily      sodium chloride      dextrose         Lab Data: Lab results independently reviewed and analyzed by myself 3/30/2022    CBC:   Recent Labs     03/29/22  1041 03/29/22  1143 03/29/22  1145 03/30/22  0237 03/30/22  0525   WBC 6.2  --  13.2*  --  17.4*   HGB 9.0*   < > 10.6* 10.9* 11.2*   *  --  132*  --  236    < > = values in this interval not displayed. BMP:    Recent Labs     03/29/22  0409 03/29/22  0409 03/29/22  1145 03/29/22  1145 03/29/22  1730 03/29/22  2330 03/30/22  0525     137  --  133*  --   --   --  134*   K 3.9  3.9   < > 4.6   < > 4.4 4.2 4.5   CO2 24  24  --  23  --   --   --  23   BUN 21*  21*  --  18  --   --   --  12   CREATININE 0.9  0.9  --  0.9  --   --   --  0.8*    < > = values in this interval not displayed. INR:    Recent Labs     03/29/22  0409 03/29/22  1041 03/29/22  1145   INR 1.07 1.50* 1.33*     BNP:  No results for input(s): PROBNP in the last 72 hours. Cardiac Enzymes: No results for input(s): TROPONINI in the last 72 hours.   Lipids:   Lab Results   Component Value Date    TRIG 132 03/29/2022    HDL 33 03/29/2022    [address] 121 03/29/2022       Cardiac Imaging:    Procedure(s):3/29/2022:  CORONARY ARTERY BYPASS GRAFT X2, SAPHENOUS VEIN GRAFT, INTERNAL MAMMARY ARTERY, URSZULA, TEMPORARY PACING WIRE PLACEMENT, ON PUMP, LEFT ATRIAL APPENDAGE CLIP PLACEMENT, BILATERAL PECTORALIS BLOCKS AND PLATELET GEL APPLICATION      Echo 5/18/3442:  Normal left ventricle size, wall thickness, and systolic function with an   estimated ejection fraction of 40-45%. Mild hypokinesis of the inferior, inferoseptal wall segments. Normal left ventricular diastolic filling pressures. The right ventricle is normal in size and function. Trace mitral and tricuspid valve regurgitation. Systolic pulmonary artery pressure (sPAP) is normal and estimated at 23 mmHg   (right atrial pressure 3 mmHg)   Definity® used for myocardial border enhancement. Coronary angiogram 3/24/2022:  Acute inferior STEMI  PROCEDURES PERFORMED    Left heart catheterization  LVgram  Coronary angiogam  Coronary cath  Monitoring of moderate conscious sedation   Mechanical thrombectomy of RCA  IVUS of RCA  PCI of RCA with single drug-eluting sten  PROCEDURE DESCRIPTION    This was felt to be an emergency procedure. Patient was prepped draped in the usual sterile fashion. Local anaesthetic was applied over puncture site. Using a back wall technique, a 6 Czech Terumo sheath was inserted into right radial artery. Verapamil, nitroglycerin, nicardipine were administered through the sheath. Heparin was administered. Diagnostic 5 [place] pigtail, Ultram catheters were used for diagnostic angiograms. At the conclusion of the procedure, a TR band was placed over the puncture site and hemostasis was obtained. There were no immediate complications. I supervised sedation from 12:17 PM to 1:08 PM with versed 6 mg/fentanyl 200 mcg during the procedure. An independent trained observer pushed meds at my direction.   We monitored the patient's level of consciousness and vital signs/physiologic status throughout the procedure duration (see times listed previously). 130 cc contrast was utilized. <20cc EBL. FINDINGS      LVEDP  17   GRADIENT ACROSS AORTIC VALVE  none   LV FUNCTION EF 35%   WALL MOTION  inferior hypokinesis   MITRAL REGURGITATION  mild      LM  Less than 10% proximal stenosis, mid 20-30% stenosis. There is distal 70% stenosis. LAD  Proximal 20% stenosis, mid 60 to 70% stenosis, distal less than 10% stenosis. D1 has 20% dzmujcts-kif-elfczk stenosis. LCX  Less than 10% hkcmatcw-yzm-lpyjlm stenosis. RI  Difficult to fully visualize on study, is a small to medium size vessel, there does not appear to be severe obstruction, there does appear to be less than 10% dhvmbeoz-wxy-xdnpzk stenosis. RCA Dominant, large vessel, proximal-mid 100% occlusion, distal vessel was ultimately visualized on completion angiography is less than 10% stenosis. There is thrombus present in the mid vessel. PERCUTANEOUS INTERVENTION DESCRIPTION    Heparin was used for any coagulation, patient been preloaded with Brilinta, Integrilin was given during the procedure. Initially a 6 [employer] Amee JR 5 guiding catheter used to intubate the RCA. A [employer] wire was used to cross the lesion. Mechanical thrombectomy was performed with the penumbra device. Flow was restored to this. IVUS was performed which showed thrombus/dissected plaque in the mid vessel and this was then treated with stenting with Medtronic resolute [place] 4 x 38 mm drug-eluting stent. Stent was postdilated with 4.5 and 5 mm noncompliant balloons. Follow-up IVUS showed good stent apposition/expansion. There was no residual dissection or thrombus noted.   CONCLUSIONS:    Severe left main disease  Successful PCI of RCA with single drug-eluting stent   Continue Integrilin indefinitely, will consult with CT surgery, case discussed with Dr. Mcneil in Cath Lab, plan for CABG at some point in the future.

## 2022-03-30 NOTE — PROGRESS NOTES
CVTS Cardiothoracic Progress Note:                                CC:  Post op follow up     Surgery: 3/29 CORONARY ARTERY BYPASS GRAFT X2, SAPHENOUS VEIN GRAFT, INTERNAL MAMMARY ARTERY, URSZULA, TEMPORARY PACING WIRE PLACEMENT, ON PUMP, LEFT ATRIAL APPENDAGE CLIP PLACEMENT, BILATERAL PECTORALIS BLOCKS AND PLATELET GEL APPLICATION    Hospital course:  3/30 Levo and dobutamine weaned off. Hemodynamically stable this morning in SR. History reviewed. No pertinent past medical history.      Past Surgical History:   Procedure Laterality Date    CORONARY ARTERY BYPASS GRAFT N/A 3/29/2022    CORONARY ARTERY BYPASS GRAFT X2, SAPHENOUS VEIN GRAFT, INTERNAL MAMMARY ARTERY, URSZULA, TEMPORARY PACING WIRE PLACEMENT, ON PUMP, LEFT ATRIAL APPENDAGE CLIP PLACEMENT, BILATERAL PECTORALIS BLOCKS AND PLATELET GEL APPLICATION performed by Chalo Liu MD at [facility] as of 03/24/2022    (No Known Allergies)        Patient Active Problem List   Diagnosis    [facility] ([facility]    CAD in native artery    [facility]        Vital Signs: /63   Pulse 88   Temp 99.2 °F (37.3 °C) (Bladder)   Resp 27   Ht 5' 11\" (1.803 m)   Wt 186 lb 8.2 oz (84.6 kg)   SpO2 94%   BMI 26.01 kg/m²  O2 Flow Rate (L/min): 1 L/min     Admission Weight: Weight: 197 lb 15.6 oz (89.8 kg)    Weight on 3/29 (84.6 kg) Pre-op   3/30 RN to obtain weight and document     Intake/Output:     Intake/Output Summary (Last 24 hours) at 3/30/2022 1013  Last data filed at 3/30/2022 0827  Gross per 24 hour   Intake 3024 ml   Output 3392 ml   Net -368 ml      Extubation Time: 3/29 1118  Transition Time:    LABORATORY DATA:     CBC:   Recent Labs     03/29/22  1041 03/29/22  1143 03/29/22  1145 03/30/22  0237 03/30/22  0525   WBC 6.2  --  13.2*  --  17.4*   HGB 9.0*   < > 10.6* 10.9* 11.2*   HCT 26.5*  --  31.6*  --  33.4*   MCV 93.4  --  94.3  --  93.4   *  --  132*  --  236    < > = values in this interval not displayed. BMP:   Recent Labs     03/29/22  0409 03/29/22  0409 03/29/22  1145 03/29/22  1145 03/29/22  1730 03/29/22  2330 03/30/22  0525     137  --  133*  --   --   --  134*   K 3.9  3.9   < > 4.6   < > 4.4 4.2 4.5     101  --  101  --   --   --  101   CO2 24  24  --  23  --   --   --  23   BUN 21*  21*  --  18  --   --   --  12   CREATININE 0.9  0.9  --  0.9  --   --   --  0.8*    < > = values in this interval not displayed. MG:    Recent Labs     03/29/22  0409 03/29/22  1145 03/30/22  0525   MG 2.20 3.10* 2.20     PT/INR:   Recent Labs     03/29/22  0409 03/29/22  1041 03/29/22  1145   PROTIME 12.1 17.2* 15.2*   INR 1.07 1.50* 1.33*     APTT:   Recent Labs     03/29/22  1041 03/29/22  1145   APTT 34.4 36.8     CXR: 3/30/22  FINDINGS:   No change in the lines and tubes.  No change in the mild pulmonary vascular   congestion.  The cardiac silhouette is stable status post median sternotomy,   CABG and left atrial appendage closure device.  There is no pneumothorax or   pleural effusion.           Impression   1. No significant change.     ________________________________________________________________________    Subjective:   Dietary Intake: needs improvement    No Nausea   Pain Control: improved with addition of toradol   Complaints: expected post op pain   Bowels: no BM     Objective:   General appearance: resting in bed, in nad   Lungs: diminished bilateral bases   Heart: S1S2 normal; SR on monitor  Chest: symmetrical expansion with inspiration and expirations; no rocking of sternum noted   Abdomen: soft, non-tender  Bowel sounds: normoactive  Kidneys: -001-8251=2252 ml over 24 hours; Cr 0.8  Wound/Incisions: Midsternal incision CDI; RLE incisions with dressings CDI;  Pacing wires out this morning   Extremities: BLE pulses present; trace swelling noted in BLE  Neurological: alert, oriented and grossly non-focal   Chest tubes/Drains: Chest tube # 1 with 290-100-120 ml serosanguinous drainage in 24 hours; Chest tube # 2 with 65- ml serosanguinous drainage in 24 hours; no airleaks noted in either tube     Assessment:   Post-op: 1 days. Condition: In stable condition. Plan:   1. Cardiovascular: s/p CABG x 2, ZUNILDA clip (3/29)   Hemodynamically stable in SR   CAD- ASA, statin, BB, Plavix    HLD- statin as above     2. Pulmonary:   Post op pulmonary insufficiency as expected- satisfactory oxygen saturations on 2L NC, wean as able   Expansion measures- IS, acapella, oobtc, ambulation   Hx tobacco abuse- will continue to encourage cessation     3. Neurology:   Post op pain- continue PRN tylenol, oxy. Scheduled lorena, toradol added     4. Nephrology:   Adequate UOP overnight with Cr of 0.8  Awaiting AM weight   Diurese as tolerated- Lasix 20 mg IV every 6 hours     5. Endocrinology:   Glucose stable. Insulin gtt d/c'd   A1C 5.2 on 3/26/22    6. Hematology:   Acute blood loss anemia- H&H stable. Monitor     7. Microbiology:   Leukocytosis- WBC 17.4. Suspect reactive at this time. Monitor     8. Nutrition:   ADAT     9. Labs:   AM labs and imaging reviewed as above     10. Post-op Drains/Wires: TPWs removed this morning. Keeping CTs for now. Right IJ CVC (3/29)   Right brachial srikanth (3/29)     11. D/C Goals: CM following. Will await PT/OT recs.      12. Continue post-op care of patient in the ICU    GI prophylaxis: pepcid   DVT prophylaxis: arixtra   ________________________________________________________________________  JESSICA Parikh - CNP  3/30/2022  10:13 AM

## 2022-03-30 NOTE — PROGRESS NOTES
Shift: [id number]    Patient: Yury Izquierdo  YOB: 1965  MRN: [id number]     Date of Procedure: 3/29/2022     Pre-Op Diagnosis: CAD     Post-Op Diagnosis: Same       Procedure(s):  CORONARY ARTERY BYPASS GRAFT X2, SAPHENOUS VEIN GRAFT, INTERNAL MAMMARY ARTERY, URSZULA, TEMPORARY PACING WIRE PLACEMENT, ON PUMP, LEFT ATRIAL APPENDAGE CLIP PLACEMENT, BILATERAL PECTORALIS BLOCKS AND PLATELET GEL APPLICATION     Surgeon(s):  Shreya Espinal MD     Assistant:   Surgical Assistant: Rosales Candaa; [facility] assessment at handoff  stable    Rhythm changes Normal Sinus Rhythm     Rhythm Intervention None    Drop in Urinary Output no     Changes to drips?     -Levophed off  -Dobutamine off  -D5 1/2 NS continued  -Insulin titrated    POD 1 ONLY: Pacing Wires Removed: []Yes           [x] NO- report from dayshift states to leave pacer wires          [] Platelets < 56,389        [] Arrhythmia        [] Bradycardia        [] Valve Replacement         [] Pacing for Cardiac Index    LDA Insertion Date Date Changed (if needed) Discontinued Date   Art line 03/29/22     Central Line 03/29/22     ET Tube 03/29/22 03/29/22   Vila 03/29/22     Chest Tube 03/29/22     Leg Drain NA     Pacing Wires 03/29/22       Surgery, return to OR no     Hospital Course:  DOS  -Levophed off  -Dobutamine off  -CT output within limits except for dump when sitting in chair position  -Urine output within limits  -Transitioned at 1814  -PO aspirin and plavix given per MD order  * Dr. Mary Alvarenga to pull pacing wires during rounds on 3/30/22 *      Changes in O2 requirements Oxygen Therapy  SpO2: 94 %  Pulse Oximeter Device Mode: Continuous  Pulse Oximeter Device Location: Finger  O2 Device: Nasal cannula  Skin Assessment: Clean, dry, & intact  Skin Protection for O2 Device: Yes  O2 Flow Rate (L/min): 1 L/min   No increase in oxygen demand    Most recent vitals /63   Pulse 85   Temp 99.2 °F (37.3 °C) (Bladder)   Resp 22 Ht 5' 11\" (1.803 m)   Wt 186 lb 8.2 oz (84.6 kg)   SpO2 94%   BMI 26.01 kg/m²       Admission weight Weight: 197 lb 15.6 oz (89.8 kg)     Today's weight   Wt Readings from Last 1 Encounters:   03/29/22 186 lb 8.2 oz (84.6 kg)         Lab Data:  CBC:   Recent Labs     03/30/22  0525   WBC 17.4*   HGB 11.2*   HCT 33.4*   MCV 93.4        BMP:    Recent Labs     03/30/22  0525   *   K 4.5   CO2 23   BUN 12   CREATININE 0.8*     LIVR:   Recent Labs     03/29/22  0409   AST 44*   ALT 38     PT/INR:   Recent Labs     03/29/22  0409 03/29/22  1041 03/29/22  1145   PROT 6.5  --   --    INR 1.07   < > 1.33*    < > = values in this interval not displayed.      APTT:   Recent Labs     03/29/22  1145   APTT 36.8       Drip rates at handoff:    dextrose 5 % and 0.45 % NaCl 75 mL/hr at 03/30/22 0004    sodium chloride      niCARdipine      insulin 2 Units/hr (03/30/22 0533)    dextrose      DOBUTamine      milrinone      norepinephrine      dexmedetomidine (PRECEDEX) IV infusion         Interventions: 20 KCL given, insulin titrated, D5 1/2 Normal continued, patient sitting up in chair position

## 2022-03-30 NOTE — PROGRESS NOTES
Dr. Lynn Arriaga at bedside and removed vantricular pacing wires. VS per protocol. Heart tones noted.  Zoltan Brewer RN

## 2022-03-30 NOTE — PLAN OF CARE
Problem: Falls - Risk of:  Goal: Will remain free from falls  Description: Will remain free from falls  3/30/2022 1714 by Abeba Morley RN  Outcome: Ongoing  3/30/2022 0408 by Tessa Orona RN  Outcome: Ongoing  Goal: Absence of physical injury  Description: Absence of physical injury  3/30/2022 1714 by Abeba Morley RN  Outcome: Ongoing  3/30/2022 0408 by Tessa Orona RN  Outcome: Ongoing     Problem: Nutritional:  Goal: Ability to tolerate tube feedings without aspirating will improve  Description: Ability to tolerate tube feedings without aspirating will improve  3/30/2022 1714 by Abeba Morley RN  Outcome: Ongoing  3/30/2022 0408 by Tessa Orona RN  Outcome: Ongoing  Goal: Consumption of food in small portions  Description: Consumption of food in small portions  3/30/2022 1714 by Abeba Morley RN  Outcome: Ongoing  3/30/2022 0408 by Tessa Orona RN  Outcome: Ongoing  Goal: Consumption of liquid of appropriate consistency  Description: Consumption of liquid of appropriate consistency  3/30/2022 1714 by Abeba Morley RN  Outcome: Ongoing  3/30/2022 0408 by Tessa Orona RN  Outcome: Ongoing     Problem: Respiratory:  Goal: Ability to maintain a clear airway will improve  Description: Ability to maintain a clear airway will improve  3/30/2022 1714 by Abeba Morley RN  Outcome: Ongoing  3/30/2022 0408 by Tessa Orona RN  Outcome: Ongoing  Goal: Will remain free from infection  Description: Will remain free from infection  3/30/2022 1714 by Abeba Morley RN  Outcome: Ongoing  3/30/2022 0408 by Tessa Orona RN  Outcome: Ongoing  Goal: Absence of aspiration  Description: Absence of aspiration  3/30/2022 1714 by Abeba Morley RN  Outcome: Ongoing  3/30/2022 0408 by Tessa Orona RN  Outcome: Ongoing     Problem: Safety:  Goal: Ability to chew and swallow food without choking will improve  Description: Ability to chew and swallow food without choking will improve  3/30/2022 1714 by Feli Charles RN  Outcome: Ongoing  3/30/2022 0408 by Jeffrey Obregon RN  Outcome: Ongoing  Goal: Ability to demonstrate good, daily oral hygiene techniques will improve  Description: Ability to demonstrate good, daily oral hygiene techniques will improve  3/30/2022 1714 by Feli Charles RN  Outcome: Ongoing  3/30/2022 0408 by Jeffrey Obregon RN  Outcome: Ongoing  Goal: Maintenance of upright position during and after feeding  Description: Maintenance of upright position during and after feeding  3/30/2022 1714 by Feli Charles RN  Outcome: Ongoing  3/30/2022 0408 by Jeffrey Obregon RN  Outcome: Ongoing     Problem: Discharge Planning:  Goal: Discharged to appropriate level of care  Description: Discharged to appropriate level of care  3/30/2022 1714 by Feli Charles RN  Outcome: Ongoing  3/30/2022 0408 by Jeffrey Obregon RN  Outcome: Ongoing     Problem: Pain - Acute:  Description: Pain management should include both nonpharmacologic and pharmacologic interventions.   Goal: Pain level will decrease  Description: Pain level will decrease  3/30/2022 1714 by Feli Charles RN  Outcome: Ongoing  3/30/2022 0408 by Jeffrey Obregon RN  Outcome: Ongoing     Problem: Fluid Volume - Imbalance:  Goal: Will show no signs and symptoms of excessive bleeding  Description: Will show no signs and symptoms of excessive bleeding  3/30/2022 1714 by Feli Charles RN  Outcome: Ongoing  3/30/2022 0408 by Jeffrey Obregon RN  Outcome: Ongoing  Goal: Absence of imbalanced fluid volume signs and symptoms  Description: Absence of imbalanced fluid volume signs and symptoms  3/30/2022 1714 by Feli Charles RN  Outcome: Ongoing  3/30/2022 0408 by Jeffrey Obregon RN  Outcome: Ongoing  Goal: Ability to achieve a balanced intake and output will improve  Description: Ability to achieve a balanced intake and output will improve  Outcome: Ongoing  Goal: Chest tube drainage is within specified parameters  Description: Chest tube drainage is within specified parameters  Outcome: Ongoing

## 2022-03-30 NOTE — PLAN OF CARE
Problem: Nutrition  Goal: Optimal nutrition therapy  Outcome: Ongoing  Note: Nutrition Problem #1: Inadequate oral intake  Intervention: Food and/or Nutrient Delivery: Continue Current Diet,Start Oral Nutrition Supplement  Nutritional Goals: Pt will tolerate and consume 50% or greater of meals and ONS this admission

## 2022-03-30 NOTE — CARE COORDINATION
S/P CABG. Getting lasix IV Q 6 hours. Patient from home with spouse and was IPTA. PT/OT pending. Will need home care at minimum. Will follow.

## 2022-03-30 NOTE — PLAN OF CARE
Problem: Falls - Risk of:  Goal: Will remain free from falls  Description: Will remain free from falls  3/30/2022 1940 by Jamel Leavitt RN  Outcome: Ongoing  3/30/2022 1714 by Jamel Leavitt RN  Outcome: Ongoing  Goal: Absence of physical injury  Description: Absence of physical injury  3/30/2022 1940 by Jamel Leavitt RN  Outcome: Ongoing  3/30/2022 1714 by Jamel Leavitt RN  Outcome: Ongoing     Problem: Nutritional:  Goal: Ability to tolerate tube feedings without aspirating will improve  Description: Ability to tolerate tube feedings without aspirating will improve  3/30/2022 1940 by Jamel Leavitt RN  Outcome: Ongoing  3/30/2022 1714 by Jamel Leavitt RN  Outcome: Ongoing  Goal: Consumption of food in small portions  Description: Consumption of food in small portions  3/30/2022 1940 by Jamel Leavitt RN  Outcome: Ongoing  3/30/2022 1714 by Jamel Leavitt RN  Outcome: Ongoing  Goal: Consumption of liquid of appropriate consistency  Description: Consumption of liquid of appropriate consistency  3/30/2022 1940 by Jamel Leavitt RN  Outcome: Ongoing  3/30/2022 1714 by Jamel Leavitt RN  Outcome: Ongoing     Problem: Respiratory:  Goal: Ability to maintain a clear airway will improve  Description: Ability to maintain a clear airway will improve  3/30/2022 1940 by Jamel Leavitt RN  Outcome: Ongoing  3/30/2022 1714 by Jamel Leavitt RN  Outcome: Ongoing  Goal: Will remain free from infection  Description: Will remain free from infection  3/30/2022 1940 by Jamel Leavitt RN  Outcome: Ongoing  3/30/2022 1714 by Jamel Leavitt RN  Outcome: Ongoing  Goal: Absence of aspiration  Description: Absence of aspiration  3/30/2022 1940 by Jamel Leavitt RN  Outcome: Ongoing  3/30/2022 1714 by Jamel Leavitt RN  Outcome: Ongoing     Problem: Safety:  Goal: Ability to chew and swallow food without choking will improve  Description: Ability to chew and swallow food without choking will improve  3/30/2022 1940 by Chepe Singleton RN  Outcome: Ongoing  3/30/2022 1714 by Chepe Singleton RN  Outcome: Ongoing  Goal: Ability to demonstrate good, daily oral hygiene techniques will improve  Description: Ability to demonstrate good, daily oral hygiene techniques will improve  3/30/2022 1940 by Chepe Singleton RN  Outcome: Ongoing  3/30/2022 1714 by Chepe Singleton RN  Outcome: Ongoing  Goal: Maintenance of upright position during and after feeding  Description: Maintenance of upright position during and after feeding  3/30/2022 1940 by Chepe Singleton RN  Outcome: Ongoing  3/30/2022 1714 by Chepe Singleton RN  Outcome: Ongoing     Problem: Discharge Planning:  Goal: Discharged to appropriate level of care  Description: Discharged to appropriate level of care  3/30/2022 1940 by Chepe Singleton RN  Outcome: Ongoing  3/30/2022 1714 by Chepe Singleton RN  Outcome: Ongoing     Problem: Pain - Acute:  Description: Pain management should include both nonpharmacologic and pharmacologic interventions.   Goal: Pain level will decrease  Description: Pain level will decrease  3/30/2022 1940 by Chepe Singleton RN  Outcome: Ongoing  3/30/2022 1714 by Chepe Singleton RN  Outcome: Ongoing     Problem: Fluid Volume - Imbalance:  Goal: Will show no signs and symptoms of excessive bleeding  Description: Will show no signs and symptoms of excessive bleeding  3/30/2022 1940 by Chepe Singleton RN  Outcome: Ongoing  3/30/2022 1714 by Chepe Singleton RN  Outcome: Ongoing  Goal: Absence of imbalanced fluid volume signs and symptoms  Description: Absence of imbalanced fluid volume signs and symptoms  3/30/2022 1940 by Chepe Singleton RN  Outcome: Ongoing  3/30/2022 1714 by Chepe Singleton RN  Outcome: Ongoing  Goal: Ability to achieve a balanced intake and output will improve  Description: Ability to achieve a balanced intake and output will improve  3/30/2022 1940 by Feli Charles RN  Outcome: Ongoing  3/30/2022 1714 by Feli Charles RN  Outcome: Ongoing  Goal: Chest tube drainage is within specified parameters  Description: Chest tube drainage is within specified parameters  3/30/2022 1940 by Feli Charles RN  Outcome: Ongoing  3/30/2022 1714 by Feli Charles RN  Outcome: Ongoing     Problem: Anxiety:  Goal: Level of anxiety will decrease  Description: Level of anxiety will decrease  3/30/2022 1940 by Feli Charles RN  Outcome: Ongoing  3/30/2022 1714 by Feli Charles RN  Outcome: Ongoing     Problem: Tissue Perfusion - Cardiopulmonary, Altered:  Goal: Hemodynamic stability will improve  Description: Hemodynamic stability will improve  3/30/2022 1940 by Feli Charles RN  Outcome: Ongoing  3/30/2022 1714 by Feli Charles RN  Outcome: Ongoing  Goal: Circulatory function within specified parameters  Description: Circulatory function within specified parameters  3/30/2022 1940 by Feli Charles RN  Outcome: Ongoing  3/30/2022 1714 by Feli Charles RN  Outcome: Ongoing  Goal: Absence of angina  Description: Absence of angina  3/30/2022 1940 by Feli Charles RN  Outcome: Ongoing  3/30/2022 1714 by Feli Charles RN  Outcome: Ongoing  Goal: Will show no evidence of cardiac arrhythmias  Description: Will show no evidence of cardiac arrhythmias  3/30/2022 1940 by Feli Charles RN  Outcome: Ongoing  3/30/2022 1714 by Feli Charles RN  Outcome: Ongoing     Problem: Cardiac Output - Decreased:  Goal: Cardiac output within specified parameters  Description: Cardiac output within specified parameters  3/30/2022 1940 by Feli Charles RN  Outcome: Ongoing  3/30/2022 1714 by Feli Charles RN  Outcome: Ongoing  Goal: Hemodynamic stability will improve  Description: Hemodynamic stability will improve  3/30/2022 1940 by Feli Charles RN  Outcome: Ongoing  3/30/2022 1714 by Feli Charles RN  Outcome: Ongoing Problem: Tissue Perfusion - Cardiopulmonary, Altered:  Goal: Hemodynamic stability will improve  Description: Hemodynamic stability will improve  3/30/2022 1940 by Yosvany Garcia RN  Outcome: Ongoing  3/30/2022 1714 by Yosvany Garcia RN  Outcome: Ongoing  Goal: Circulatory function within specified parameters  Description: Circulatory function within specified parameters  3/30/2022 1940 by Yosvany Garcia RN  Outcome: Ongoing  3/30/2022 1714 by Yosvany Garcia RN  Outcome: Ongoing  Goal: Absence of angina  Description: Absence of angina  3/30/2022 1940 by Yosvany Garcia RN  Outcome: Ongoing  3/30/2022 1714 by Yosvany Garcia RN  Outcome: Ongoing  Goal: Will show no evidence of cardiac arrhythmias  Description: Will show no evidence of cardiac arrhythmias  3/30/2022 1940 by Yosvany Garcia RN  Outcome: Ongoing  3/30/2022 1714 by Yosvany Garcia RN  Outcome: Ongoing     Problem: Tissue Perfusion - Peripheral, Altered:  Goal: Absence of hematoma at arterial access site  Description: Absence of hematoma at arterial access site  3/30/2022 1940 by Yosvany Garcia RN  Outcome: Ongoing  3/30/2022 1714 by Yosvany Garcia RN  Outcome: Ongoing  Goal: Circulatory function of lower extremities is within specified parameters  Description: Circulatory function of lower extremities is within specified parameters  3/30/2022 1940 by Yosvany Garcia RN  Outcome: Ongoing  3/30/2022 1714 by Yosvany Garcia RN  Outcome: Ongoing     Problem: Tobacco Use:  Goal: Will participate in inpatient tobacco-use cessation counseling  Description: Will participate in inpatient tobacco-use cessation counseling  3/30/2022 1940 by Yosvany Garcia RN  Outcome: Ongoing  3/30/2022 1714 by Yosvany Garcia RN  Outcome: Ongoing     Problem: Nutrition  Goal: Optimal nutrition therapy  3/30/2022 1940 by Yosvany Garcia RN  Outcome: Ongoing  3/30/2022 1714 by Yosvany Garcia RN  Outcome: Ongoing  3/30/2022 1335 by Yumiko Tovar MS, RD, LD  Outcome: Ongoing  Note: Nutrition Problem #1: Inadequate oral intake  Intervention: Food and/or Nutrient Delivery: Continue Current Diet,Start Oral Nutrition Supplement  Nutritional Goals: Pt will tolerate and consume 50% or greater of meals and ONS this admission       Problem: Pain:  Description: Pain management should include both nonpharmacologic and pharmacologic interventions. Goal: Pain level will decrease  Description: Pain level will decrease  3/30/2022 1940 by Bebeto Ty RN  Outcome: Ongoing  3/30/2022 1714 by Bebeto Ty RN  Outcome: Ongoing  Goal: Control of acute pain  Description: Control of acute pain  3/30/2022 1940 by Bebeto Ty RN  Outcome: Ongoing  3/30/2022 1714 by Bebeto Ty RN  Outcome: Ongoing  Goal: Control of chronic pain  Description: Control of chronic pain  3/30/2022 1940 by Bebeto Ty RN  Outcome: Ongoing  3/30/2022 1714 by Bebeto Ty RN  Outcome: Ongoing     Problem: Activity Intolerance:  Goal: Able to perform prescribed physical activity  Description: Able to perform prescribed physical activity  3/30/2022 1940 by Bebeto Ty RN  Outcome: Ongoing  3/30/2022 1714 by Bebeto Ty RN  Outcome: Ongoing  Goal: Ability to tolerate increased activity will improve  Description: Ability to tolerate increased activity will improve  3/30/2022 1940 by Bebeto Ty RN  Outcome: Ongoing  3/30/2022 1714 by Bebeto Ty RN  Outcome: Ongoing     Problem: Pain:  Description: Pain management should include both nonpharmacologic and pharmacologic interventions.   Goal: Pain level will decrease  Description: Pain level will decrease  3/30/2022 1940 by Bebeto Ty RN  Outcome: Ongoing  3/30/2022 1714 by Bebeto Ty RN  Outcome: Ongoing  Goal: Control of acute pain  Description: Control of acute pain  3/30/2022 1940 by Bebeto Ty RN  Outcome: Ongoing  3/30/2022 1714 by Juarez Rosario Dickson Gilmore RN  Outcome: Ongoing  Goal: Control of chronic pain  Description: Control of chronic pain  3/30/2022 1940 by Bebeto Ty RN  Outcome: Ongoing  3/30/2022 1714 by Bebeto Ty RN  Outcome: Ongoing     Problem: Gas Exchange - Impaired:  Goal: Levels of oxygenation will improve  Description: Levels of oxygenation will improve  3/30/2022 1940 by Bebeto Ty RN  Outcome: Ongoing  3/30/2022 1714 by Bebeto Ty RN  Outcome: Ongoing  Goal: Ability to maintain adequate ventilation will improve  Description: Ability to maintain adequate ventilation will improve  3/30/2022 1940 by Bebeto Ty RN  Outcome: Ongoing  3/30/2022 1714 by Bebeto Ty RN  Outcome: Ongoing

## 2022-03-30 NOTE — CONSULTS
Comprehensive Nutrition Assessment    Type and Reason for Visit:  Initial,Consult,RD Nutrition Re-Screen/LOS    Nutrition Recommendations/Plan:   1. Continue Recommend General diet order, free of therapeutic restrictions, to promote adequate nutrient intake  2. Add Ensure BID - monitor acceptance, pt requested chocolate   3. Encourage PO intakes as tolerated s/p surgery   4. Provide diet education when appropriate  5. Monitor nutrition adequacy, pertinent labs, bowel habits, wt changes, and clinical progress    Nutrition Assessment:  S/p CABG x2 on 3/29. Diet advanced to general yesterday. Pt reports poor appetite today. Since admission PO intakes of 0-50% per EMR. Discussed importance of nutrition for healing s/p surgery. Pt agreeable to trial high protein, high calorie ONS, RD to add. Consulted for diet education. Pt requested RD to return when feeling better and wife present. Will reattempt heart healthy diet education. Will monitor. Malnutrition Assessment:  Malnutrition Status: At risk for malnutrition (Comment)    Context:  Acute Illness     Findings of the 6 clinical characteristics of malnutrition:  Energy Intake:  7 - 50% or less of estimated energy requirements for 5 or more days    Estimated Daily Nutrient Needs:  Energy (kcal):  9194-5773 kcal; Weight Used for Energy Requirements:  Ideal (78 kg)     Protein (g):  78-94 g; Weight Used for Protein Requirements:  Ideal (1.0-1.2 g/kg)        Fluid (ml/day):   ; Method Used for Fluid Requirements:  1 ml/kcal      Nutrition Related Findings:  Na 134. BM x1 on 3/28. A1c of 5.2%. Wounds:  Surgical Incision       Current Nutrition Therapies:    ADULT DIET; Regular    Anthropometric Measures:  · Height: 5' 11\" (180.3 cm)  · Current Body Weight: 186 lb (84.4 kg)   · Admission Body Weight: 197 lb (89.4 kg) (bed scale)    · Ideal Body Weight: 172 lbs; % Ideal Body Weight 108.1 %   · BMI: 26  · BMI Categories: Overweight (BMI 25.0-29. 9)       Nutrition Diagnosis:   · Inadequate oral intake related to inadequate protein-energy intake as evidenced by intake 0-25%,intake 26-50% (s/p CABG)      Nutrition Interventions:   Food and/or Nutrient Delivery:  Continue Current Diet,Start Oral Nutrition Supplement  Nutrition Education/Counseling:  Education needed   Coordination of Nutrition Care:  Continue to monitor while inpatient    Goals:  Pt will tolerate and consume 50% or greater of meals and ONS this admission       Nutrition Monitoring and Evaluation:   Behavioral-Environmental Outcomes:  None Identified   Food/Nutrient Intake Outcomes:  Diet Advancement/Tolerance,Food and Nutrient Intake,Supplement Intake  Physical Signs/Symptoms Outcomes:  Biochemical Data,Nutrition Focused Physical Findings,Weight     Discharge Planning:    Continue current diet,Continue Oral Nutrition Supplement     Electronically signed by Yumiko Tovar MS, RD, LD on 3/30/22 at 1:34 PM EDT    Contact: [phone]

## 2022-03-31 ENCOUNTER — APPOINTMENT (OUTPATIENT)
Dept: GENERAL RADIOLOGY | Age: 57
DRG: 232 | End: 2022-03-31
Payer: COMMERCIAL

## 2022-03-31 LAB
ANION GAP SERPL CALCULATED.3IONS-SCNC: 11 MMOL/L (ref 3–16)
BUN BLDV-MCNC: 20 MG/DL (ref 7–20)
CALCIUM IONIZED: 1.03 MMOL/L (ref 1.12–1.32)
CALCIUM SERPL-MCNC: 8.1 MG/DL (ref 8.3–10.6)
CHLORIDE BLD-SCNC: 98 MMOL/L (ref 99–110)
CO2: 25 MMOL/L (ref 21–32)
CREAT SERPL-MCNC: 0.9 MG/DL (ref 0.9–1.3)
GFR AFRICAN AMERICAN: >60
GFR NON-AFRICAN AMERICAN: >60
GLUCOSE BLD-MCNC: 130 MG/DL (ref 70–99)
GLUCOSE BLD-MCNC: 132 MG/DL (ref 70–99)
HCT VFR BLD CALC: 29.8 % (ref 40.5–52.5)
HEMOGLOBIN: 10.1 G/DL (ref 13.5–17.5)
MAGNESIUM: 2 MG/DL (ref 1.8–2.4)
MCH RBC QN AUTO: 31.5 PG (ref 26–34)
MCHC RBC AUTO-ENTMCNC: 34 G/DL (ref 31–36)
MCV RBC AUTO: 92.7 FL (ref 80–100)
PDW BLD-RTO: 12.8 % (ref 12.4–15.4)
PERFORMED ON: ABNORMAL
PH VENOUS: 7.52 (ref 7.35–7.45)
PLATELET # BLD: 182 K/UL (ref 135–450)
PMV BLD AUTO: 8.8 FL (ref 5–10.5)
POC ACT LR: 179 SEC
POC ACT LR: 80 SEC
POTASSIUM SERPL-SCNC: 4.3 MMOL/L (ref 3.5–5.1)
RBC # BLD: 3.21 M/UL (ref 4.2–5.9)
SODIUM BLD-SCNC: 134 MMOL/L (ref 136–145)
WBC # BLD: 13.5 K/UL (ref 4–11)

## 2022-03-31 PROCEDURE — 2580000003 HC RX 258: Performed by: STUDENT IN AN ORGANIZED HEALTH CARE EDUCATION/TRAINING PROGRAM

## 2022-03-31 PROCEDURE — 83735 ASSAY OF MAGNESIUM: CPT

## 2022-03-31 PROCEDURE — 6360000002 HC RX W HCPCS: Performed by: STUDENT IN AN ORGANIZED HEALTH CARE EDUCATION/TRAINING PROGRAM

## 2022-03-31 PROCEDURE — 80048 BASIC METABOLIC PNL TOTAL CA: CPT

## 2022-03-31 PROCEDURE — 2500000003 HC RX 250 WO HCPCS: Performed by: STUDENT IN AN ORGANIZED HEALTH CARE EDUCATION/TRAINING PROGRAM

## 2022-03-31 PROCEDURE — 85027 COMPLETE CBC AUTOMATED: CPT

## 2022-03-31 PROCEDURE — 6370000000 HC RX 637 (ALT 250 FOR IP): Performed by: NURSE PRACTITIONER

## 2022-03-31 PROCEDURE — 2580000003 HC RX 258: Performed by: INTERNAL MEDICINE

## 2022-03-31 PROCEDURE — 2140000000 HC CCU INTERMEDIATE R&B

## 2022-03-31 PROCEDURE — 71045 X-RAY EXAM CHEST 1 VIEW: CPT

## 2022-03-31 PROCEDURE — 6370000000 HC RX 637 (ALT 250 FOR IP): Performed by: THORACIC SURGERY (CARDIOTHORACIC VASCULAR SURGERY)

## 2022-03-31 PROCEDURE — 99024 POSTOP FOLLOW-UP VISIT: CPT | Performed by: NURSE PRACTITIONER

## 2022-03-31 PROCEDURE — 6370000000 HC RX 637 (ALT 250 FOR IP): Performed by: STUDENT IN AN ORGANIZED HEALTH CARE EDUCATION/TRAINING PROGRAM

## 2022-03-31 PROCEDURE — 97116 GAIT TRAINING THERAPY: CPT

## 2022-03-31 PROCEDURE — 97530 THERAPEUTIC ACTIVITIES: CPT

## 2022-03-31 PROCEDURE — 82330 ASSAY OF CALCIUM: CPT

## 2022-03-31 RX ORDER — FUROSEMIDE 20 MG/1
20 TABLET ORAL 2 TIMES DAILY
Status: DISCONTINUED | OUTPATIENT
Start: 2022-03-31 | End: 2022-04-02 | Stop reason: HOSPADM

## 2022-03-31 RX ADMIN — FAMOTIDINE 20 MG: 20 TABLET, FILM COATED ORAL at 09:44

## 2022-03-31 RX ADMIN — FUROSEMIDE 20 MG: 10 INJECTION, SOLUTION INTRAMUSCULAR; INTRAVENOUS at 06:31

## 2022-03-31 RX ADMIN — METOPROLOL TARTRATE 12.5 MG: 25 TABLET, FILM COATED ORAL at 20:54

## 2022-03-31 RX ADMIN — KETOROLAC TROMETHAMINE 15 MG: 30 INJECTION, SOLUTION INTRAMUSCULAR; INTRAVENOUS at 09:43

## 2022-03-31 RX ADMIN — POTASSIUM CHLORIDE 10 MEQ: 10 TABLET, EXTENDED RELEASE ORAL at 17:45

## 2022-03-31 RX ADMIN — Medication 400 MG: at 20:54

## 2022-03-31 RX ADMIN — MUPIROCIN: 20 OINTMENT TOPICAL at 09:44

## 2022-03-31 RX ADMIN — Medication 15 ML: at 09:44

## 2022-03-31 RX ADMIN — MORPHINE SULFATE 2 MG: 2 INJECTION, SOLUTION INTRAMUSCULAR; INTRAVENOUS at 10:49

## 2022-03-31 RX ADMIN — ASPIRIN 81 MG: 81 TABLET, COATED ORAL at 09:44

## 2022-03-31 RX ADMIN — ATORVASTATIN CALCIUM 80 MG: 80 TABLET, FILM COATED ORAL at 20:54

## 2022-03-31 RX ADMIN — Medication 1 TABLET: at 09:43

## 2022-03-31 RX ADMIN — POTASSIUM CHLORIDE 10 MEQ: 10 TABLET, EXTENDED RELEASE ORAL at 11:22

## 2022-03-31 RX ADMIN — METOPROLOL TARTRATE 12.5 MG: 25 TABLET, FILM COATED ORAL at 09:44

## 2022-03-31 RX ADMIN — MUPIROCIN: 20 OINTMENT TOPICAL at 20:56

## 2022-03-31 RX ADMIN — MORPHINE SULFATE 2 MG: 2 INJECTION, SOLUTION INTRAMUSCULAR; INTRAVENOUS at 11:23

## 2022-03-31 RX ADMIN — OXYCODONE 5 MG: 5 TABLET ORAL at 09:43

## 2022-03-31 RX ADMIN — SODIUM CHLORIDE, PRESERVATIVE FREE 10 ML: 5 INJECTION INTRAVENOUS at 20:55

## 2022-03-31 RX ADMIN — FAMOTIDINE 20 MG: 20 TABLET, FILM COATED ORAL at 20:54

## 2022-03-31 RX ADMIN — Medication 15 ML: at 20:56

## 2022-03-31 RX ADMIN — FUROSEMIDE 20 MG: 20 TABLET ORAL at 17:45

## 2022-03-31 RX ADMIN — GABAPENTIN 100 MG: 100 CAPSULE ORAL at 14:14

## 2022-03-31 RX ADMIN — GABAPENTIN 100 MG: 100 CAPSULE ORAL at 09:44

## 2022-03-31 RX ADMIN — FUROSEMIDE 20 MG: 20 TABLET ORAL at 09:46

## 2022-03-31 RX ADMIN — KETOROLAC TROMETHAMINE 15 MG: 30 INJECTION, SOLUTION INTRAMUSCULAR; INTRAVENOUS at 02:52

## 2022-03-31 RX ADMIN — Medication 400 MG: at 09:43

## 2022-03-31 RX ADMIN — GABAPENTIN 100 MG: 100 CAPSULE ORAL at 20:54

## 2022-03-31 RX ADMIN — POTASSIUM CHLORIDE 10 MEQ: 10 TABLET, EXTENDED RELEASE ORAL at 09:43

## 2022-03-31 RX ADMIN — CALCIUM CHLORIDE 1000 MG: 100 INJECTION, SOLUTION INTRAVENOUS at 09:58

## 2022-03-31 RX ADMIN — KETOROLAC TROMETHAMINE 15 MG: 30 INJECTION, SOLUTION INTRAMUSCULAR; INTRAVENOUS at 14:13

## 2022-03-31 RX ADMIN — CLOPIDOGREL BISULFATE 75 MG: 75 TABLET ORAL at 09:44

## 2022-03-31 RX ADMIN — POTASSIUM CHLORIDE 20 MEQ: 29.8 INJECTION, SOLUTION INTRAVENOUS at 06:46

## 2022-03-31 ASSESSMENT — PAIN DESCRIPTION - PAIN TYPE
TYPE: SURGICAL PAIN
TYPE: SURGICAL PAIN

## 2022-03-31 ASSESSMENT — PAIN SCALES - GENERAL
PAINLEVEL_OUTOF10: 0
PAINLEVEL_OUTOF10: 7
PAINLEVEL_OUTOF10: 0
PAINLEVEL_OUTOF10: 6
PAINLEVEL_OUTOF10: 4
PAINLEVEL_OUTOF10: 7
PAINLEVEL_OUTOF10: 6
PAINLEVEL_OUTOF10: 0
PAINLEVEL_OUTOF10: 6

## 2022-03-31 ASSESSMENT — PAIN DESCRIPTION - LOCATION
LOCATION: INCISION
LOCATION: INCISION;STERNUM

## 2022-03-31 ASSESSMENT — PAIN DESCRIPTION - DESCRIPTORS: DESCRIPTORS: ACHING;SORE;CONSTANT

## 2022-03-31 ASSESSMENT — PAIN DESCRIPTION - FREQUENCY: FREQUENCY: CONTINUOUS

## 2022-03-31 ASSESSMENT — PAIN DESCRIPTION - ORIENTATION: ORIENTATION: MID

## 2022-03-31 NOTE — PROGRESS NOTES
CVTS Cardiothoracic Progress Note:                                CC:  Post op follow up     Surgery: 3/29 CORONARY ARTERY BYPASS GRAFT X2, SAPHENOUS VEIN GRAFT, INTERNAL MAMMARY ARTERY, URSZULA, TEMPORARY PACING WIRE PLACEMENT, ON PUMP, LEFT ATRIAL APPENDAGE CLIP PLACEMENT, BILATERAL PECTORALIS BLOCKS AND PLATELET GEL APPLICATION    Hospital course:  3/30 Levo and dobutamine weaned off. Hemodynamically stable this morning in SR.   3/31 No acute events overnight. Minimal chest tube output. Hemodynamically stable in SR. History reviewed. No pertinent past medical history.      Past Surgical History:   Procedure Laterality Date    CORONARY ARTERY BYPASS GRAFT N/A 3/29/2022    CORONARY ARTERY BYPASS GRAFT X2, SAPHENOUS VEIN GRAFT, INTERNAL MAMMARY ARTERY, URSZULA, TEMPORARY PACING WIRE PLACEMENT, ON PUMP, LEFT ATRIAL APPENDAGE CLIP PLACEMENT, BILATERAL PECTORALIS BLOCKS AND PLATELET GEL APPLICATION performed by Raven Harkins MD at [address] [place] [address] as of 03/24/2022    (No Known Allergies)        Patient Active Problem List   Diagnosis    [facility]    CAD in native artery    STEMI (ST elevation myocardial infarction) ([facility]    S/P CABG x 3    Ischemic cardiomyopathy    Pure hypercholesterolemia    Tobacco abuse        Vital Signs: BP (!) 91/45   Pulse 89   Temp 100.7 °F (38.2 °C) (Bladder)   Resp 16   Ht 5' 11\" (1.803 m)   Wt 192 lb (87.1 kg)   SpO2 94%   BMI 26.78 kg/m²  O2 Flow Rate (L/min): 1 L/min     Admission Weight: Weight: 197 lb 15.6 oz (89.8 kg)    Weight on 3/29 (84.6 kg) Pre-op   3/31 87.1 kg     Intake/Output:     Intake/Output Summary (Last 24 hours) at 3/31/2022 0924  Last data filed at 3/31/2022 0651  Gross per 24 hour   Intake 1024 ml   Output 1280 ml   Net -256 ml      Extubation Time: 3/29 1118  Transition Time: 3/29 1730    LABORATORY DATA:     CBC:   Recent Labs     03/29/22  1145 03/29/22  1145 03/30/22  0237 03/30/22  0525 03/31/22  0550   WBC 13.2* --   --  17.4* 13.5*   HGB 10.6*   < > 10.9* 11.2* 10.1*   HCT 31.6*  --   --  33.4* 29.8*   MCV 94.3  --   --  93.4 92.7   *  --   --  236 182    < > = values in this interval not displayed. BMP:   Recent Labs     03/29/22  1145 03/29/22  1730 03/29/22  2330 03/30/22  0525 03/31/22  0550   *  --   --  134* 134*   K 4.6   < > 4.2 4.5 4.3     --   --  101 98*   CO2 23  --   --  23 25   BUN 18  --   --  12 20   CREATININE 0.9  --   --  0.8* 0.9    < > = values in this interval not displayed. MG:    Recent Labs     03/29/22  1145 03/30/22  0525 03/31/22  0550   MG 3.10* 2.20 2.00     PT/INR:   Recent Labs     03/29/22  0409 03/29/22  1041 03/29/22  1145   PROTIME 12.1 17.2* 15.2*   INR 1.07 1.50* 1.33*     APTT:   Recent Labs     03/29/22  1041 03/29/22  1145   APTT 34.4 36.8     CXR: 3/31/22  FINDINGS:   Status post median sternotomy.  Mediastinal drain and left chest tube.  Left   atrial exclusion device.  Right internal jugular Cordis.  Prominence of   pulmonary vascularity is again demonstrated.  No significant pleural   effusion.  No gross pneumothorax.  Cardiac and mediastinal silhouettes are   unchanged.           Impression   Persistent pulmonary vascular congestion.         ____________________________________________________________________    Subjective:   Dietary Intake: slowly improving     No Nausea   Pain Control: expected post op pain   Complaints: chest tube related pain   Bowels: no BM, +flatus     Objective:   General appearance: resting in bed, in nad   Lungs: diminished bilateral bases   Heart: S1S2 normal; SR on monitor  Chest: symmetrical expansion with inspiration and expirations; no rocking of sternum noted   Abdomen: soft, non-tender  Bowel sounds: normoactive  Kidneys: -410-350=8054 ml over 24 hours; Cr 0.9  Wound/Incisions: Midsternal incision CDI; RLE incisions with dressings CDI;  Pacing wires out 3/30  Extremities: BLE pulses present; trace swelling noted in BLE  Neurological: alert, oriented and grossly non-focal   Chest tubes/Drains: Chest tube # 1/2 with 100-30-50= 180 ml serosanguinous drainage in 24 hours; Chest tube # 3 with 0-20-30=50 ml serosanguinous drainage in 24 hours; no airleaks noted in either tube     Assessment:   Post-op: 2 days. Condition: In stable condition. Plan:   1. Cardiovascular: s/p CABG x 2, ZUNILDA clip (3/29)   Hemodynamically stable in SR   CAD (with recent DANIA to RCA, 3/24)- ASA, statin, BB, Plavix    HLD- statin as above     2. Pulmonary:   Post op pulmonary insufficiency as expected- resolved. Satisfactory oxygen saturations on RA   Continue expansion measures- IS, acapella, oobtc, ambulation   Hx tobacco abuse- will continue to encourage cessation     3. Neurology:   Post op pain- continue PRN tylenol, oxy. Scheduled lorena, toradol    4. Nephrology:   Adequate UOP overnight with Cr of 0.8  Weight up 2.5 kg from pre-op   Diurese as tolerated- transition to PO Lasix- 20 mg PO twice daily     5. Endocrinology:   Glucose stable. Has not required any SSI coverage- d/c   A1C 5.2 on 3/26/22    6. Hematology:   Acute blood loss anemia- H&H stable. Monitor     7. Microbiology:   Leukocytosis- WBC trending down, 13.5. Suspect reactive. Tmax 100.7- needs expansion. Monitor     8. Nutrition:   Continue diet as ordered      9. Labs:   AM labs and imaging reviewed as above   Hypocalcemia- replace     10. Post-op Drains/Wires: Remove CTs, f/c and arterial line   Right IJ CVC (3/29)   Right brachial srikanth (3/29)     11. D/C Goals: CM following. PT/OT with recs for home with 24 hours supervision/assist. Anticipate d/c in the next 1-2 days.      12. Continue post-op care of patient in the ICU    GI prophylaxis: pepcid   DVT prophylaxis: arixtra   ________________________________________________________________________  JESSICA Rivera CNP  3/31/2022  9:24 AM

## 2022-03-31 NOTE — PROGRESS NOTES
Shift: [id number]      Patient: Macario Rucker  YOB: 1965  MRN: [id number]     Date of Procedure: 3/29/2022     Pre-Op Diagnosis: CAD     Post-Op Diagnosis: Same       Procedure(s):  CORONARY ARTERY BYPASS GRAFT X2, SAPHENOUS VEIN GRAFT, INTERNAL MAMMARY ARTERY, URSZULA, TEMPORARY PACING WIRE PLACEMENT, ON PUMP, LEFT ATRIAL APPENDAGE CLIP PLACEMENT, BILATERAL PECTORALIS BLOCKS AND PLATELET GEL APPLICATION     Surgeon(s):  Tammy Bruce MD     Assistant:   Surgical Assistant: Woodson Osgood; [facility] assessment at handoff  stable    Rhythm changes Normal Sinus Rhythm     Rhythm Intervention None    Drop in Urinary Output no     Changes to drips?   Insulin off      LDA Insertion Date Date Changed (if needed) Discontinued Date   Art line 03/29/22     Central Line 03/29/22     ET Tube 03/29/22 03/29/22   Vila 03/29/22     Chest Tube 03/29/22     Leg Drain      Pacing Wires 03/29/22  3/30/22     Surgery, return to OR no     Hospital Course:  DOS  -Levophed off  -Dobutamine off  -Platelets x1 unit given  -Nonrebreather d/c'd and placed on NC  -CT output within limits  -Urine output within limits  -Transitioned at 1814  -PO aspirin and plavix given per MD order  * Dr. Cueto Loosen to pull pacing wires during rounds on 3/30/22 *    POD#1  OOB to chair x 2, PT/OT, minimal CT output, Insulin gtt off    Patient on room air      Changes in O2 requirements Oxygen Therapy  SpO2: 92 %  Pulse Oximeter Device Mode: Intermittent  Pulse Oximeter Device Location: Finger  O2 Device: Nasal cannula  Skin Assessment: Clean, dry, & intact  Skin Protection for O2 Device: Yes  O2 Flow Rate (L/min): 1 L/min Room air  No increase in oxygen demand    Most recent vitals BP (!) 91/45   Pulse 87   Temp 100.7 °F (38.2 °C) (Bladder)   Resp 16   Ht 5' 11\" (1.803 m)   Wt 192 lb (87.1 kg)   SpO2 92%   BMI 26.78 kg/m²       Admission weight Weight: 197 lb 15.6 oz (89.8 kg)     Today's weight   Wt Readings from Last 1 Encounters:   03/31/22 192 lb (87.1 kg)         Lab Data:  CBC:   Recent Labs     03/31/22  0550   WBC 13.5*   HGB 10.1*   HCT 29.8*   MCV 92.7        BMP:    Recent Labs     03/31/22  0550   *   K 4.3   CO2 25   BUN 20   CREATININE 0.9     LIVR:   Recent Labs     03/29/22  0409   AST 44*   ALT 38     PT/INR:   Recent Labs     03/29/22  0409 03/29/22  1041 03/29/22  1145   PROT 6.5  --   --    INR 1.07   < > 1.33*    < > = values in this interval not displayed.      APTT:   Recent Labs     03/29/22  1145   APTT 36.8       Drip rates at handoff:    sodium chloride      dextrose           Electronically signed by Zain Shah RN on 3/31/2022 at 6:54 AM

## 2022-03-31 NOTE — PROGRESS NOTES
Occupational Therapy  OT follow up attempted, per RN pt resting until 1400, and then RN pulling A-line. Will continue to follow and re-attempt as able.   SHANNEN Londono/L

## 2022-03-31 NOTE — PLAN OF CARE
Problem: Falls - Risk of:  Goal: Will remain free from falls  Description: Will remain free from falls  3/31/2022 0038 by Tessa Orona RN  Outcome: Ongoing  3/30/2022 1940 by Abeba Morley RN  Outcome: Ongoing  3/30/2022 1714 by Abeba Morley RN  Outcome: Ongoing     Problem: Fluid Volume - Imbalance:  Goal: Will show no signs and symptoms of excessive bleeding  Description: Will show no signs and symptoms of excessive bleeding  3/31/2022 0038 by Tessa Orona RN  Outcome: Ongoing  3/30/2022 1940 by Abeba Morley RN  Outcome: Ongoing  3/30/2022 1714 by Abeba Morley RN  Outcome: Ongoing     Problem: Fluid Volume - Imbalance:  Goal: Chest tube drainage is within specified parameters  Description: Chest tube drainage is within specified parameters  3/31/2022 0038 by Tessa Orona RN  Outcome: Ongoing  3/30/2022 1940 by Abeba Morley RN  Outcome: Ongoing  3/30/2022 1714 by Abeba Morley RN  Outcome: Ongoing

## 2022-03-31 NOTE — PROGRESS NOTES
Physical Therapy  Facility/Department: [facility] C2 CARD TELEMETRY  Daily Treatment Note  NAME: Paul Bowers  : 1965  MRN: [id number]    Date of Service: 3/31/2022    Discharge Recommendations:  24 hour supervision or assist   PT Equipment Recommendations  Other: Anticipate no DME needs at D/C    Assessment   Body structures, Functions, Activity limitations: Decreased functional mobility ; Decreased strength;Decreased endurance; Increased pain;Decreased posture  Assessment: Pt with improved participation with PT and activity tolerance today vs. yesterday, ambulating increased distance and performing sit<>stand transfers with SBA. Pt can still be irritable with regard to pain and has difficulty focusing on tasks at times, although participated well. Continue to recommend 24-hr sup initially for safety at D/C. Treatment Diagnosis: Decreased endurance and (I) with mobility  Specific instructions for Next Treatment: Progress ther ex and mobility as tolerated  Prognosis: Good  Decision Making: High Complexity  PT Education: Goals; General Safety;Gait Training;PT Role;Plan of Care;Disease Specific Education; Functional Mobility Training;Precautions;Transfer Training;Energy Conservation;Weight-bearing Education;Home Exercise Program;Equipment  Patient Education: Disease-specific education: Pt educated on sternal precautions with transfers, role of PT in hospital, and safety in transfers/amb - pt verbalizes understanding. REQUIRES PT FOLLOW UP: Yes  Activity Tolerance: Patient Tolerated treatment well;Patient limited by pain; Patient limited by endurance  Activity Tolerance: Vitals during session on room air: BP = 120/71, HR = 90 bpm, O2 sat = 93%. Patient Diagnosis(es): The encounter diagnosis was ST elevation myocardial infarction (STEMI), unspecified artery ([facility] [address] has no past medical history on file.    has a past surgical history that includes Coronary artery bypass graft (N/A, 3/29/2022). Restrictions  Restrictions/Precautions  Restrictions/Precautions: Cardiac,Fall Risk,General Precautions  Position Activity Restriction  Sternal Precautions: No Pushing,No Pulling,5# Lifting Restrictions  Other position/activity restrictions: A-line, 2 chest tubes, telemetry, [place], multiple IV lines     Subjective   General  Chart Reviewed: Yes  Response To Previous Treatment: Patient with no complaints from previous session. Family / Caregiver Present: No  Referring Practitioner: Christa Roche MD  Subjective  Subjective: Pt agreeable to work with PT this morning. Seems slightly irritated at times 2* pain although generally cooperative. General Comment  Comments: Pt sitting up in chair upon entry of PT  Pain Screening  Patient Currently in Pain: Yes  Pain Assessment  Pain Assessment: 0-10  Pain Level: 7  Pain Type: Surgical pain  Pain Location: Incision;Sternum  Pain Orientation: Mid  Pain Descriptors: Aching;Sore;Constant  Pain Frequency: Continuous  Non-Pharmaceutical Pain Intervention(s): Ambulation/Increased Activity; Distraction; Emotional support;Repositioned       Orientation  Orientation  Overall Orientation Status: Within Normal Limits    Objective   Bed mobility  Supine to Sit: Unable to assess (pt up in chair upon entry of PT)  Sit to Supine: Moderate assistance  Scootin Person assistance (mod-maxA x 2 to scoot up in bed)     Transfers  Sit to Stand: Stand by assistance  Stand to sit: Stand by assistance  Bed to Chair: Stand by assistance (using 4WW, moving from chair>bed)     Ambulation  Surface: level tile  Device: No Device  Assistance: Contact guard assistance;Stand by assistance  Quality of Gait: Pt amb with slow gely with decreased stride length B. Pt requires 4WW for support in light of mod/severe sternal pain -- although appears fairly steady.   Gait Deviations: Slow Gely;Decreased step length  Distance: x 50 feet  Comments: Amb distance limited by c/o fatigue and pain.     Balance  Posture: Good  Sitting - Static: Good  Sitting - Dynamic: Good;-  Standing - Static: Good;-  Standing - Dynamic: Good;-     Exercises  Gluteal Sets: x 10 bilat  Ankle Pumps: x 10 BLE  Comments: Reviewed other LE ther ex that pt can perform on his own including heel slides, supine hip ABD/ADD, and LAQ - pt verbalizes understanding. AM-PAC Score  AM-PAC Inpatient Mobility Raw Score : 16 (03/31/22 1121)  AM-PAC Inpatient T-Scale Score : 40.78 (03/31/22 1121)  Mobility Inpatient CMS 0-100% Score: 54.16 (03/31/22 1121)  Mobility Inpatient CMS G-Code Modifier : CK (03/31/22 1121)    Goals  Short term goals  Time Frame for Short term goals: 1 week, 4/06/22 (unless otherwise specified)  Short term goal 1: Pt will transfer supine <-> sit with supervision  Short term goal 2: Pt will transfer sit <-> stand and bed>chair with modified(I)  Short term goal 3: Pt will ambulate x 300 feet without AD with modified(I)  Short term goal 4: Pt will ambulate up/down 4 steps using 1 handrail as needed with modified(I)  Short term goal 5: By 4/03/22: Pt will tolerate 12-15 reps BLE exercise for strengthening, balance, and endurance  Patient Goals   Patient goals : \"To be able to walk again and go home\"    Plan    Times per week: 5-7x/week in acute care  Times per day: Daily  Specific instructions for Next Treatment: Progress ther ex and mobility as tolerated  Current Treatment Recommendations: Strengthening,Balance Training,Functional Mobility Training,Transfer Training,Gait Training,Stair training,Endurance [employer] Exercise Program,Safety Education & Training  Safety Devices:  All fall risk precautions in place,Gait belt,Patient at risk for falls,Left in bed,Call light within reach,Nurse notified,Bed alarm in place     Therapy Time   Individual Concurrent Group Co-treatment   Time In 0805         Time Out 0845         Minutes 40         Timed Code Treatment Minutes: Anyi PT, DPT #[id number]    If pt is unable to be seen after this session, please let this note serve as discharge summary. Please see case management note for discharge disposition. Thank you.

## 2022-04-01 ENCOUNTER — APPOINTMENT (OUTPATIENT)
Dept: GENERAL RADIOLOGY | Age: 57
DRG: 232 | End: 2022-04-01
Payer: COMMERCIAL

## 2022-04-01 DIAGNOSIS — Z95.1 S/P CABG (CORONARY ARTERY BYPASS GRAFT): Primary | ICD-10-CM

## 2022-04-01 PROBLEM — I21.3 STEMI (ST ELEVATION MYOCARDIAL INFARCTION) (HCC): Status: RESOLVED | Noted: 2022-03-24 | Resolved: 2022-04-01

## 2022-04-01 PROBLEM — E43 SEVERE MALNUTRITION (HCC): Status: ACTIVE | Noted: 2022-04-01

## 2022-04-01 LAB
ANION GAP SERPL CALCULATED.3IONS-SCNC: 9 MMOL/L (ref 3–16)
BLOOD BANK DISPENSE STATUS: NORMAL
BLOOD BANK DISPENSE STATUS: NORMAL
BLOOD BANK PRODUCT CODE: NORMAL
BLOOD BANK PRODUCT CODE: NORMAL
BPU ID: NORMAL
BPU ID: NORMAL
BUN BLDV-MCNC: 23 MG/DL (ref 7–20)
CALCIUM SERPL-MCNC: 8.4 MG/DL (ref 8.3–10.6)
CHLORIDE BLD-SCNC: 99 MMOL/L (ref 99–110)
CO2: 27 MMOL/L (ref 21–32)
CREAT SERPL-MCNC: 0.9 MG/DL (ref 0.9–1.3)
DESCRIPTION BLOOD BANK: NORMAL
DESCRIPTION BLOOD BANK: NORMAL
GFR AFRICAN AMERICAN: >60
GFR NON-AFRICAN AMERICAN: >60
GLUCOSE BLD-MCNC: 112 MG/DL (ref 70–99)
HCT VFR BLD CALC: 28.9 % (ref 40.5–52.5)
HEMOGLOBIN: 9.7 G/DL (ref 13.5–17.5)
MAGNESIUM: 2.1 MG/DL (ref 1.8–2.4)
MCH RBC QN AUTO: 31.3 PG (ref 26–34)
MCHC RBC AUTO-ENTMCNC: 33.5 G/DL (ref 31–36)
MCV RBC AUTO: 93.4 FL (ref 80–100)
PDW BLD-RTO: 12.9 % (ref 12.4–15.4)
PLATELET # BLD: 200 K/UL (ref 135–450)
PMV BLD AUTO: 8.6 FL (ref 5–10.5)
POTASSIUM SERPL-SCNC: 4.4 MMOL/L (ref 3.5–5.1)
RBC # BLD: 3.09 M/UL (ref 4.2–5.9)
SODIUM BLD-SCNC: 135 MMOL/L (ref 136–145)
WBC # BLD: 11.4 K/UL (ref 4–11)

## 2022-04-01 PROCEDURE — 6370000000 HC RX 637 (ALT 250 FOR IP): Performed by: NURSE PRACTITIONER

## 2022-04-01 PROCEDURE — 97110 THERAPEUTIC EXERCISES: CPT

## 2022-04-01 PROCEDURE — 6370000000 HC RX 637 (ALT 250 FOR IP): Performed by: STUDENT IN AN ORGANIZED HEALTH CARE EDUCATION/TRAINING PROGRAM

## 2022-04-01 PROCEDURE — 99024 POSTOP FOLLOW-UP VISIT: CPT | Performed by: STUDENT IN AN ORGANIZED HEALTH CARE EDUCATION/TRAINING PROGRAM

## 2022-04-01 PROCEDURE — 6360000002 HC RX W HCPCS: Performed by: STUDENT IN AN ORGANIZED HEALTH CARE EDUCATION/TRAINING PROGRAM

## 2022-04-01 PROCEDURE — 80048 BASIC METABOLIC PNL TOTAL CA: CPT

## 2022-04-01 PROCEDURE — 97116 GAIT TRAINING THERAPY: CPT

## 2022-04-01 PROCEDURE — 2580000003 HC RX 258: Performed by: INTERNAL MEDICINE

## 2022-04-01 PROCEDURE — 6370000000 HC RX 637 (ALT 250 FOR IP): Performed by: THORACIC SURGERY (CARDIOTHORACIC VASCULAR SURGERY)

## 2022-04-01 PROCEDURE — 2140000000 HC CCU INTERMEDIATE R&B

## 2022-04-01 PROCEDURE — 94761 N-INVAS EAR/PLS OXIMETRY MLT: CPT

## 2022-04-01 PROCEDURE — 97535 SELF CARE MNGMENT TRAINING: CPT

## 2022-04-01 PROCEDURE — 85027 COMPLETE CBC AUTOMATED: CPT

## 2022-04-01 PROCEDURE — 83735 ASSAY OF MAGNESIUM: CPT

## 2022-04-01 PROCEDURE — 71045 X-RAY EXAM CHEST 1 VIEW: CPT

## 2022-04-01 PROCEDURE — 97530 THERAPEUTIC ACTIVITIES: CPT

## 2022-04-01 PROCEDURE — 6370000000 HC RX 637 (ALT 250 FOR IP): Performed by: INTERNAL MEDICINE

## 2022-04-01 PROCEDURE — 2700000000 HC OXYGEN THERAPY PER DAY

## 2022-04-01 RX ORDER — POTASSIUM CHLORIDE 750 MG/1
10 TABLET, EXTENDED RELEASE ORAL DAILY
Qty: 7 TABLET | Refills: 0 | Status: SHIPPED | OUTPATIENT
Start: 2022-04-01 | End: 2022-04-11 | Stop reason: ALTCHOICE

## 2022-04-01 RX ORDER — OXYCODONE HYDROCHLORIDE 5 MG/1
5 TABLET ORAL EVERY 6 HOURS PRN
Qty: 28 TABLET | Refills: 0 | Status: SHIPPED | OUTPATIENT
Start: 2022-04-01 | End: 2022-04-08

## 2022-04-01 RX ORDER — ASPIRIN 81 MG/1
81 TABLET ORAL DAILY
Qty: 30 TABLET | Refills: 0 | Status: SHIPPED | OUTPATIENT
Start: 2022-04-02 | End: 2022-04-25 | Stop reason: SDUPTHER

## 2022-04-01 RX ORDER — LANOLIN ALCOHOL/MO/W.PET/CERES
400 CREAM (GRAM) TOPICAL DAILY
Qty: 7 TABLET | Refills: 0 | Status: SHIPPED | OUTPATIENT
Start: 2022-04-01 | End: 2022-04-11 | Stop reason: ALTCHOICE

## 2022-04-01 RX ORDER — POLYETHYLENE GLYCOL 3350 17 G/17G
17 POWDER, FOR SOLUTION ORAL DAILY PRN
Qty: 7 EACH | Refills: 0 | Status: SHIPPED | OUTPATIENT
Start: 2022-04-01 | End: 2022-04-08

## 2022-04-01 RX ORDER — CLOPIDOGREL BISULFATE 75 MG/1
75 TABLET ORAL DAILY
Qty: 30 TABLET | Refills: 0 | Status: SHIPPED | OUTPATIENT
Start: 2022-04-02 | End: 2022-04-25 | Stop reason: SDUPTHER

## 2022-04-01 RX ORDER — FAMOTIDINE 20 MG/1
20 TABLET, FILM COATED ORAL DAILY
Qty: 30 TABLET | Refills: 0 | Status: SHIPPED | OUTPATIENT
Start: 2022-04-01 | End: 2022-04-25 | Stop reason: SDUPTHER

## 2022-04-01 RX ORDER — FUROSEMIDE 20 MG/1
20 TABLET ORAL DAILY
Qty: 7 TABLET | Refills: 0 | Status: SHIPPED | OUTPATIENT
Start: 2022-04-01 | End: 2022-04-11 | Stop reason: ALTCHOICE

## 2022-04-01 RX ORDER — BISACODYL 10 MG
10 SUPPOSITORY, RECTAL RECTAL ONCE
Status: DISCONTINUED | OUTPATIENT
Start: 2022-04-01 | End: 2022-04-02 | Stop reason: HOSPADM

## 2022-04-01 RX ORDER — ATORVASTATIN CALCIUM 80 MG/1
80 TABLET, FILM COATED ORAL NIGHTLY
Qty: 30 TABLET | Refills: 0 | Status: SHIPPED | OUTPATIENT
Start: 2022-04-01 | End: 2022-04-25 | Stop reason: SDUPTHER

## 2022-04-01 RX ADMIN — POLYETHYLENE GLYCOL 3350 17 G: 17 POWDER, FOR SOLUTION ORAL at 10:46

## 2022-04-01 RX ADMIN — Medication 400 MG: at 21:17

## 2022-04-01 RX ADMIN — OXYCODONE 5 MG: 5 TABLET ORAL at 06:21

## 2022-04-01 RX ADMIN — GABAPENTIN 100 MG: 100 CAPSULE ORAL at 09:37

## 2022-04-01 RX ADMIN — POTASSIUM CHLORIDE 10 MEQ: 10 TABLET, EXTENDED RELEASE ORAL at 09:37

## 2022-04-01 RX ADMIN — OXYCODONE 10 MG: 5 TABLET ORAL at 16:06

## 2022-04-01 RX ADMIN — FUROSEMIDE 20 MG: 20 TABLET ORAL at 16:06

## 2022-04-01 RX ADMIN — MUPIROCIN: 20 OINTMENT TOPICAL at 09:39

## 2022-04-01 RX ADMIN — METOPROLOL TARTRATE 12.5 MG: 25 TABLET, FILM COATED ORAL at 09:37

## 2022-04-01 RX ADMIN — GABAPENTIN 100 MG: 100 CAPSULE ORAL at 12:33

## 2022-04-01 RX ADMIN — POTASSIUM CHLORIDE 10 MEQ: 10 TABLET, EXTENDED RELEASE ORAL at 12:33

## 2022-04-01 RX ADMIN — MUPIROCIN: 20 OINTMENT TOPICAL at 21:17

## 2022-04-01 RX ADMIN — METOPROLOL TARTRATE 12.5 MG: 25 TABLET, FILM COATED ORAL at 21:15

## 2022-04-01 RX ADMIN — FUROSEMIDE 20 MG: 20 TABLET ORAL at 18:43

## 2022-04-01 RX ADMIN — POTASSIUM CHLORIDE 10 MEQ: 10 TABLET, EXTENDED RELEASE ORAL at 18:42

## 2022-04-01 RX ADMIN — POTASSIUM CHLORIDE 10 MEQ: 10 TABLET, EXTENDED RELEASE ORAL at 16:06

## 2022-04-01 RX ADMIN — CLOPIDOGREL BISULFATE 75 MG: 75 TABLET ORAL at 09:37

## 2022-04-01 RX ADMIN — Medication 15 ML: at 09:39

## 2022-04-01 RX ADMIN — Medication 1 TABLET: at 09:37

## 2022-04-01 RX ADMIN — Medication 15 ML: at 21:17

## 2022-04-01 RX ADMIN — FAMOTIDINE 20 MG: 20 TABLET, FILM COATED ORAL at 09:37

## 2022-04-01 RX ADMIN — Medication 400 MG: at 09:37

## 2022-04-01 RX ADMIN — ASPIRIN 81 MG: 81 TABLET, COATED ORAL at 09:38

## 2022-04-01 RX ADMIN — OXYCODONE 5 MG: 5 TABLET ORAL at 00:27

## 2022-04-01 RX ADMIN — GABAPENTIN 100 MG: 100 CAPSULE ORAL at 21:15

## 2022-04-01 RX ADMIN — ATORVASTATIN CALCIUM 80 MG: 80 TABLET, FILM COATED ORAL at 21:15

## 2022-04-01 RX ADMIN — FUROSEMIDE 20 MG: 20 TABLET ORAL at 09:37

## 2022-04-01 RX ADMIN — SODIUM CHLORIDE, PRESERVATIVE FREE 10 ML: 5 INJECTION INTRAVENOUS at 21:17

## 2022-04-01 RX ADMIN — FAMOTIDINE 20 MG: 20 TABLET, FILM COATED ORAL at 21:15

## 2022-04-01 ASSESSMENT — PAIN SCALES - GENERAL
PAINLEVEL_OUTOF10: 4
PAINLEVEL_OUTOF10: 6
PAINLEVEL_OUTOF10: 10
PAINLEVEL_OUTOF10: 6
PAINLEVEL_OUTOF10: 10
PAINLEVEL_OUTOF10: 0

## 2022-04-01 ASSESSMENT — PAIN DESCRIPTION - PAIN TYPE
TYPE: ACUTE PAIN
TYPE: ACUTE PAIN;SURGICAL PAIN

## 2022-04-01 ASSESSMENT — PAIN DESCRIPTION - LOCATION
LOCATION: CHEST
LOCATION: STERNUM

## 2022-04-01 ASSESSMENT — PAIN DESCRIPTION - ORIENTATION: ORIENTATION: MID

## 2022-04-01 NOTE — DISCHARGE INSTR - COC
Continuity of Care Form    Patient Name: Champ Mckinney   :  1965  MRN:  [id number]    Admit date:  3/24/2022  Discharge date:  2022    Code Status Order: Full Code   Advance Directives:      Admitting Physician:  Mehul Branham MD  PCP: No primary care provider on file. [facility]Room[facility] 0226/0226-01  Discharging Unit Phone Number: [phone]    Emergency Contact:   Extended Emergency Contact Information  Primary Emergency Contact: Anuel Liu 3883  Mobile Phone: [phone]  Relation: Spouse  Preferred language: English    Past Surgical History:  Past Surgical History:   Procedure Laterality Date    CORONARY ARTERY BYPASS GRAFT N/A 3/29/2022    CORONARY ARTERY BYPASS GRAFT X2, SAPHENOUS VEIN GRAFT, INTERNAL MAMMARY ARTERY, URSZULA, TEMPORARY PACING WIRE PLACEMENT, ON PUMP, LEFT ATRIAL APPENDAGE CLIP PLACEMENT, BILATERAL PECTORALIS BLOCKS AND PLATELET GEL APPLICATION performed by Radha Dodd MD at [address]       Immunization History: There is no immunization history on file for this patient.     Active Problems:  Patient Active Problem List   Diagnosis Code    Acute myocardial infarction ([facility] [address] I21.9    CAD in native artery I25.10    STEMI (ST elevation myocardial infarction) ([facility] [address] I21.3    S/P CABG x 3 Z95.1    Ischemic cardiomyopathy I25.5    Pure hypercholesterolemia E78.00    Tobacco abuse Z72.0       Isolation/Infection:   Isolation            No Isolation          Patient Infection Status       None to display            Nurse Assessment:  Last Vital Signs: /62   Pulse 86   Temp 98.9 °F (37.2 °C) (Oral)   Resp 18   Ht 5' 11\" (1.803 m)   Wt 189 lb 8 oz (86 kg)   SpO2 94%   BMI 26.43 kg/m²     Last documented pain score (0-10 scale): Pain Level: 6  Last Weight:   Wt Readings from Last 1 Encounters:   22 189 lb 8 oz (86 kg)     Mental Status: A/O x4    IV Access: N/A    Nursing Mobility/ADLs:  Walking SB  Transfer SB  Bathing SB  Dressing SB  Toileting SB  Feeding independent  Med Admin independent  Med Delivery  x1    Wound Care Documentation and Therapy:        Elimination:  Continence: Bowel: yes  Bladder: yes  Urinary Catheter: N/A  Colostomy/Ileostomy/Ileal Conduit: no       Date of Last BM: 4/2/2022    Intake/Output Summary (Last 24 hours) at 4/1/2022 1047  Last data filed at 4/1/2022 0700  Gross per 24 hour   Intake 681 ml   Output 950 ml   Net -269 ml     I/O last 3 completed shifts: In: 899 [P.O.:120; I.V.:481; Blood:200]  Out: 2290 [Urine:2050; Chest Tube:240]    Safety Concerns:     Sternal precautions    Impairments/Disabilities:      Sternal precautions    Nutrition Therapy:  Current Nutrition Therapy:   Regular diet    Routes of Feeding: PO  Liquids: thins  Daily Fluid Restriction: none  Last Modified Barium Swallow with Video (Video Swallowing Test): N/A    Treatments at the Time of Hospital Discharge:   Respiratory Treatments: N/A  Oxygen Therapy: N/A  Ventilator: N/A    Rehab Therapies:   Weight Bearing Status/Restrictions:    Other Medical Equipment (for information only, NOT a DME order): sternal pillow  Other Treatments:     Patient's personal belongings (please select all that are sent with patient):   Clothing  Cell phone    RN Partha Lozano RN    CASE MANAGEMENT/SOCIAL WORK SECTION    Inpatient Status Date: 3/24/22    Readmission Risk Assessment Score:  Readmission Risk              Risk of Unplanned Readmission:  15           Discharging to  [address] [place] [address]   [phone]          / signature: {Esignature:[id number]}    PHYSICIAN SECTION    Prognosis: Good    Condition at Discharge: Stable    Rehab Potential (if transferring to Rehab): Good    Recommended Labs or Other Treatments After Discharge: ***    Physician Certification: I certify the above information and transfer of Kody Guerrero  is necessary for the continuing treatment of the diagnosis listed and that he requires [address] for less 30 days.      Update Admission H&P: No change in H&P    PHYSICIAN SIGNATURE:  Electronically signed by JESSICA Tang CNP on 4/1/22 at 10:47 AM EDT

## 2022-04-01 NOTE — PROGRESS NOTES
04/01/22 1341   RT Protocol   History Pulmonary Disease 1   Respiratory pattern 0   Breath sounds 0   Cough 0   Bronchodilator Assessment Score 1

## 2022-04-01 NOTE — PROGRESS NOTES
Occupational Therapy  Facility/Department: [facility] C2 CARD TELEMETRY  Daily Treatment Note  NAME: Zachery Ngo  : 1965  MRN: [id number]    Date of Service: 2022    Discharge Recommendations:  24 hour supervision or assist       Assessment   Performance deficits / Impairments: Decreased functional mobility ; Decreased ADL status; Decreased safe awareness;Decreased endurance  Assessment: Patient evaluated s/p CABG x 2  on 3/29/22. Pt IPTA working full time in [employer] [facility] [employer], lives with spouse. Today patient intially gaurded for movement and OOB activity however will to participate with encouragement. Pt was CGA for sit<>stands and mod assist for LE ADLs. Pt able to tolerated a total of 11 mins of standing this date with mod reports of fatigue towards the end of activity. Pt will continue to benefit from skilled OT services while in acute care as pt functioning below baseline post-op. Anticipate home with 24hr assist.  Prognosis: Good  OT Education: OT Role;Transfer Training;Plan of Care;ADL Adaptive Strategies;Precautions; Equipment  Patient Education: Disease Specific Education: Pt educated on sternal precautions, safe mobility, energy conservation, and task modification. Pt verbalized understanding but will require reinforcement. REQUIRES OT FOLLOW UP: Yes  Activity Tolerance  Activity Tolerance: Patient Tolerated treatment well  Activity Tolerance: Vitals: 94% on 1L, /62, HR 91 bpm  Safety Devices  Safety Devices in place: Yes  Type of devices: Gait belt;Call light within reach; Left in bed;Nurse notified         Patient Diagnosis(es): The encounter diagnosis was ST elevation myocardial infarction (STEMI), unspecified artery ([facility] [address] has no past medical history on file. has a past surgical history that includes Coronary artery bypass graft (N/A, 3/29/2022).     Restrictions  Restrictions/Precautions  Restrictions/Precautions: Cardiac,Fall Risk,General Precautions  Position Activity Restriction  Sternal Precautions: No Pushing,No Pulling,5# Lifting Restrictions  Other position/activity restrictions: A-line, 2 chest tubes, telemetry, Vila, multiple IV lines  Subjective   General  Chart Reviewed: Yes,Orders,Progress Notes,Operative Notes,History and Physical  Patient assessed for rehabilitation services?: Yes  Response to previous treatment: Patient with no complaints from previous session  Family / Caregiver Present: No  Referring Practitioner: Yung Henriquez MD 3/30/22  Diagnosis: s/p CABG x 2  Subjective  Subjective: Pt with increased anxiety this date and reporting concerns for discharge home. Pt stated talking with [facility] helped ease concerns. Discussed discharge planning and IP rehab vs home with PRN assist. Will continue to follow up to address discharge needs. General Comment  Comments: RN cleared pt for therapy  Pre Treatment Pain Screening  Comments / Details: Pt reporting moderate pain during activity however reported able to continue with treatment. Vital Signs  Patient Currently in Pain: Denies   Orientation  Orientation  Overall Orientation Status: Within Functional Limits  Objective    ADL  Grooming:  (declined)  UE Dressing:  (declined)  LE Dressing: Moderate assistance (required assist threading B feet through underwear and pants however was able to adjust pants over waste with increased time.)  Toileting:  (declined)  Additional Comments: Pt declining further ADLs        Balance  Sitting Balance: Stand by assistance  Standing Balance: Contact guard assistance  Standing Balance  Time: 3 mins, 8 mins  Activity: LB dressing; standing tolerance activity  Comment: Pt tolerated well with no SOB reported. Vitals stayed [facility] mobility  Comment: Unable to assess.  Pt seated in chair upon entry/exit  Transfers  Sit to stand: Contact guard assistance  Stand to sit: Contact guard assistance  Transfer Comments: Pt required 1 verbal cue for sternal precautions and was able to maintain precautions 2/3 of sit<>stand from chair     Cognition  Overall Cognitive Status: Exceptions  Following Commands: Follows multistep commands with increased time; Follows multistep commands with repitition  Attention Span: Difficulty attending to directions; Difficulty dividing attention; Attends with cues to redirect  Memory: Appears intact  Safety Judgement: Decreased awareness of need for assistance  Problem Solving: Decreased awareness of errors  Insights: Decreased awareness of deficits  Initiation: Requires cues for some  Sequencing: Requires cues for some        Type of ROM/Therapeutic Exercise  Type of ROM/Therapeutic Exercise: AAROM  Comment: Pt completed elbow/wrist AROM while standing and shoulder ROM while seated in chair  Exercises  Shoulder Flexion: <90 degrees per sternal precautions. PROM provided to RUE d/t reports of pain during AROM with good results  Elbow Flexion: x 10 reps  Elbow Extension: x 10 reps  Supination: x 10 reps  Pronation: x 10 reps  Wrist Flexion: x 10 reps  Wrist Extension: x 10 reps  Finger Flexion: x 10 reps  Finger Extension: x 10 reps  Grasp/Release: x 10 reps        Plan   Plan  Times per week: 4-6x's a week while in acute care    AM-PAC Score  AM-[facility] Inpatient Daily Activity Raw Score: 13 (04/01/22 1211)  -PAC Inpatient ADL T-Scale Score : 32.03 (04/01/22 1211)  ADL Inpatient CMS 0-100% Score: 63.03 (04/01/22 1211)  ADL Inpatient CMS G-Code Modifier : CL (04/01/22 1211)    Goals  Short term goals  Time Frame for Short term goals: 1 week 4/06  Short term goal 1: Pt will complete toilet transfers with SBA by 4/03- ongoing 4/1  Short term goal 2: Pt will complete LE dressing with CGA- ongoing 4/1; mod A  Short term goal 3: Pt will complete standing level ADLs with SBA- ongoing 4/1  Short term goal 4: Pt will verbalize understanding of 3 of 3 sternal precautions. - ongoing 4/1; 2 out of 3  Patient Goals   Patient goals : \"to go home\"       Therapy Time   Individual Concurrent Group Co-treatment   Time In 0945         Time Out 1026         Minutes 41              If pt is unable to be seen after this session, please let this note serve as discharge summary. Please see case management note for discharge disposition. Thank you.     Jose Luis Esqueda OT

## 2022-04-01 NOTE — PROGRESS NOTES
Comprehensive Nutrition Assessment    Type and Reason for Visit:  Reassess    Nutrition Recommendations/Plan:   1. Continue general diet   2. Encourage PO intakes as tolerated  3. Consider addition of bowel regimen - MD to advise   4. If PO intakes remain low, may need to consider enteral nutrition. Consult dietitian for recommendations. 5. Monitor nutrition adequacy, pertinent labs, bowel habits, wt changes, and clinical progress    Nutrition Assessment:  Follow up: Pt remains nutritionally compromised AEB poor appetite and PO intakes. Pt reports mainly eating fruit. Discussed importance of nutrition for healing s/p surgery. Encourage PO intakes as tolerated. Declined all ONS at this time. Pt reports has a menu, encouraged pt to review. Discussed menu options for possible increase to PO intakes. Pt again declined diet education this date, will continue to attempt. Handout left at bedside. Malnutrition Assessment:  Malnutrition Status:  Severe malnutrition    Context:  Acute Illness     Findings of the 6 clinical characteristics of malnutrition:  Energy Intake:  7 - 50% or less of estimated energy requirements for 5 or more days  Weight Loss:  7 - Greater than 2% over 1 week       Estimated Daily Nutrient Needs:  Energy (kcal):  6381-8054 kcal; Weight Used for Energy Requirements:  Ideal (78 kg)     Protein (g):  78-94 g; Weight Used for Protein Requirements:  Ideal (1.0-1.2 g/kg)        Fluid (ml/day):   ; Method Used for Fluid Requirements:  1 ml/kcal      Nutrition Related Findings:  Na 135. Last BM on 3/28. No bowel regimen ordered. 4% weight loss in 1 week. Wounds:  Surgical Incision       Current Nutrition Therapies:    ADULT DIET;  Regular    Anthropometric Measures:  · Height: 5' 11\" (180.3 cm)  · Current Body Weight: 189 lb (85.7 kg)   · Admission Body Weight: 197 lb (89.4 kg) (bed scale)    · Ideal Body Weight: 172 lbs; % Ideal Body Weight 108.1 %   · BMI: 26.4  · BMI Categories: Overweight (BMI 25.0-29. 9)       Nutrition Diagnosis:   · Severe malnutrition related to inadequate protein-energy intake as evidenced by weight loss greater than or equal to 2% in 1 week      Nutrition Interventions:   Food and/or Nutrient Delivery:  Continue Current Diet,Discontinue Oral Nutrition Supplement  Nutrition Education/Counseling:  Education needed   Coordination of Nutrition Care:  Continue to monitor while inpatient    Goals:  Pt will tolerate and consume 50% or greater of meals and ONS this admission       Nutrition Monitoring and Evaluation:   Behavioral-Environmental Outcomes:  None Identified   Food/Nutrient Intake Outcomes:  Diet Advancement/Tolerance,Food and Nutrient Intake,Supplement Intake  Physical Signs/Symptoms Outcomes:  Biochemical Data,Nutrition Focused Physical Findings,Weight     Discharge Planning:    Continue current diet,Continue Oral Nutrition Supplement     Electronically signed by Ethel Lyon, MS, RD, LD on 4/1/22 at 1:36 PM EDT    Contact: [phone]

## 2022-04-01 NOTE — CARE COORDINATION
Spoke with patient and wife at bedside. No preference for [place] [address] - referral to [facility].  Spoke with Melissa Nicholson (liaison) [phone]      Shawn Shaw RN

## 2022-04-01 NOTE — PROGRESS NOTES
CVTS Cardiothoracic Progress Note:                                CC:  Post op follow up     Surgery: 3/29 CORONARY ARTERY BYPASS GRAFT X2, SAPHENOUS VEIN GRAFT, INTERNAL MAMMARY ARTERY, URSZULA, TEMPORARY PACING WIRE PLACEMENT, ON PUMP, LEFT ATRIAL APPENDAGE CLIP PLACEMENT, BILATERAL PECTORALIS BLOCKS AND PLATELET GEL APPLICATION    Hospital course:  3/30 Levo and dobutamine weaned off. Hemodynamically stable this morning in SR.   3/31 No acute events overnight. Minimal chest tube output. Hemodynamically stable in SR.   4/1 all tubes out and doing well    History reviewed. No pertinent past medical history.      Past Surgical History:   Procedure Laterality Date    CORONARY ARTERY BYPASS GRAFT N/A 3/29/2022    CORONARY ARTERY BYPASS GRAFT X2, SAPHENOUS VEIN GRAFT, INTERNAL MAMMARY ARTERY, URSZULA, TEMPORARY PACING WIRE PLACEMENT, ON PUMP, LEFT ATRIAL APPENDAGE CLIP PLACEMENT, BILATERAL PECTORALIS BLOCKS AND PLATELET GEL APPLICATION performed by Sukumar Zepeda MD at [address] [place] [address] as of 03/24/2022    (No Known Allergies)        Patient Active Problem List   Diagnosis    Acute myocardial infarction ([facility] [address]    CAD in native artery    [facility]    S/P CABG x 3    Ischemic cardiomyopathy    Pure hypercholesterolemia    Tobacco abuse        Vital Signs: /75   Pulse 89   Temp 99.2 °F (37.3 °C) (Oral)   Resp 18   Ht 5' 11\" (1.803 m)   Wt 189 lb 8 oz (86 kg)   SpO2 93%   BMI 26.43 kg/m²  O2 Flow Rate (L/min): 1 L/min     Admission Weight: Weight: 197 lb 15.6 oz (89.8 kg)    Weight on 3/29 (84.6 kg) Pre-op   3/31 87.1 kg     Intake/Output:     Intake/Output Summary (Last 24 hours) at 4/1/2022 0826  Last data filed at 4/1/2022 0700  Gross per 24 hour   Intake 681 ml   Output 1410 ml   Net -729 ml      Extubation Time: 3/29 1118  Transition Time: 3/29 1730    LABORATORY DATA:     CBC:   Recent Labs     03/30/22  0525 03/31/22  0550 04/01/22  0500   WBC 17.4* 13.5* 11.4*   HGB 11.2* 10.1* 9.7*   HCT 33.4* 29.8* 28.9*   MCV 93.4 92.7 93.4    182 200     BMP:   Recent Labs     03/30/22  0525 03/31/22  0550 04/01/22  0500   * 134* 135*   K 4.5 4.3 4.4    98* 99   CO2 23 25 27   BUN 12 20 23*   CREATININE 0.8* 0.9 0.9     MG:    Recent Labs     03/30/22  0525 03/31/22  0550 04/01/22  0500   MG 2.20 2.00 2.10     PT/INR:   Recent Labs     03/29/22  1041 03/29/22  1145   PROTIME 17.2* 15.2*   INR 1.50* 1.33*     APTT:   Recent Labs     03/29/22  1041 03/29/22  1145   APTT 34.4 36.8     CXR: 3/31/22  FINDINGS:   Status post median sternotomy.  Mediastinal drain and left chest tube.  Left   atrial exclusion device.  Right internal jugular Cordis.  Prominence of   pulmonary vascularity is again demonstrated.  No significant pleural   effusion.  No gross pneumothorax.  Cardiac and mediastinal silhouettes are   unchanged.           Impression   Persistent pulmonary vascular congestion.         ____________________________________________________________________    Subjective:   Dietary Intake: slowly improving     No Nausea   Pain Control: expected post op pain   Complaints: chest tube related pain   Bowels: no BM, +flatus     Objective:   General appearance: resting in bed, in nad   Lungs: diminished bilateral bases   Heart: S1S2 normal; SR on monitor  Chest: symmetrical expansion with inspiration and expirations; no rocking of sternum noted   Abdomen: soft, non-tender  Bowel sounds: normoactive  Kidneys: -410-897=9562 ml over 24 hours; Cr 0.9  Wound/Incisions: Midsternal incision CDI; RLE incisions with dressings CDI; Pacing wires out 3/30  Extremities: BLE pulses present; trace swelling noted in BLE  Neurological: alert, oriented and grossly non-focal   Chest tubes/Drains: Chest tube # 1/2 with 100-30-50= 180 ml serosanguinous drainage in 24 hours;  Chest tube # 3 with 0-20-30=50 ml serosanguinous drainage in 24 hours; no airleaks noted in either tube Assessment:   Post-op: 3 days. Condition: In stable condition. Plan:   1. Cardiovascular: s/p CABG x 2, ZUNILDA clip (3/29)   Hemodynamically stable in SR   CAD (with recent DANIA to RCA, 3/24)- ASA, statin, BB, Plavix    HLD- statin as above     2. Pulmonary:   Post op pulmonary insufficiency as expected- resolved. Satisfactory oxygen saturations on RA   Continue expansion measures- IS, acapella, oobtc, ambulation   Hx tobacco abuse- will continue to encourage cessation     3. Neurology:   Post op pain- continue PRN tylenol, oxy. Scheduled lorena, toradol    4. Nephrology:   Adequate UOP overnight with Cr of 0.8  Weight up 2.5 kg from pre-op   Diurese as tolerated- transition to PO Lasix- 20 mg PO twice daily     5. Endocrinology:   Glucose stable. Has not required any SSI coverage- d/c   A1C 5.2 on 3/26/22    6. Hematology:   Acute blood loss anemia- H&H stable. Monitor     7. Microbiology:   Leukocytosis- WBC trending down, 11.4 from 13.5. Suspect reactive. Tmax 100.1- needs expansion. Monitor     8. Nutrition:   Continue diet as ordered      9. Labs:   AM labs and imaging reviewed as above   Hypocalcemia- replace     10. Post-op Drains/Wires: Remove CTs, f/c and arterial line (3/31)  Right IJ CVC (3/29)   Right brachial srikanth (3/29)     11. D/C Goals: dc when off o2 and has bowel movement (4/1 or 4/2)     12.  Continue post-op care of patient in the ICU    GI prophylaxis: pepcid   DVT prophylaxis: arixtra   ________________________________________________________________________  Hortensia Osman MD  4/1/2022  8:26 AM

## 2022-04-01 NOTE — PROCEDURES
[facility]                               PULMONARY FUNCTION    PATIENT NAME: Tamara Green                     :        1965  MED REC NO:   [id number]                          ROOM:       1946  ACCOUNT NO:   [de-identified]                           ADMIT DATE: 2022  PROVIDER:     Jacques Galvez MD    DATE OF PROCEDURE:  2022    SPIROMETRY INTERPRETATION    The patient underwent a preop evaluation. The spirometry shows FVC to  be 52%, FEV1 to be 47%, FEV1 to FVC ratio was 91%, GMS52-57% was 34%. On the basis of this PFT, the patient has a severe obstructive airway  disease. Also the patient has indication towards restriction and if  clinically appropriate may require a full PFT. Please correlate  clinically.         Michael Bui MD    D: 2022 21:16:29       T: 2022 21:18:39     [email or web address]  Job#: [id number]     Doc#: [id number]    CC:

## 2022-04-01 NOTE — RT PROTOCOL NOTE
RT Nebulizer Bronchodilator Protocol Note    There is a bronchodilator order in the chart from a provider indicating to follow the RT Bronchodilator Protocol and there is an Initiate RT Bronchodilator Protocol order as well (see protocol at bottom of note). CXR Findings:  XR CHEST PORTABLE    Result Date: 4/1/2022  Mild pulmonary edema. XR CHEST PORTABLE    Result Date: 3/31/2022  Persistent pulmonary vascular congestion. The findings from the last RT Protocol Assessment were as follows:  Smoking: Smoker 15 pack years or more  Respiratory Pattern: Regular pattern and RR 12-20 bpm  Breath Sounds: Clear breath sounds  Cough: Strong, spontaneous, non-productive  Indication for Bronchodilator Therapy:    Bronchodilator Assessment Score: 1    Aerosolized bronchodilator medication orders have been revised according to the RT Nebulizer Bronchodilator Protocol below. Respiratory Therapist to perform RT Therapy Protocol Assessment initially then follow the protocol. Repeat RT Therapy Protocol Assessment PRN for score 0-3 or on second treatment, BID, and PRN for scores above 3. No Indications - adjust the frequency to every 6 hours PRN wheezing or bronchospasm, if no treatments needed after 48 hours then discontinue using Per Protocol order mode. If indication present, adjust the RT bronchodilator orders based on the Bronchodilator Assessment Score as indicated below. If a patient is on this medication at home then do not decrease Frequency below that used at home. 0-3 - enter or revise RT bronchodilator order(s) to equivalent RT Bronchodilator order with Frequency of every 4 hours PRN for wheezing or increased work of breathing using Per Protocol order mode.        4-6 - enter or revise RT Bronchodilator order(s) to two equivalent RT bronchodilator orders with one order with BID Frequency and one order with Frequency of every 4 hours PRN wheezing or increased work of breathing using Per Protocol order mode. 7-10 - enter or revise RT Bronchodilator order(s) to two equivalent RT bronchodilator orders with one order with TID Frequency and one order with Frequency of every 4 hours PRN wheezing or increased work of breathing using Per Protocol order mode. 11-13 - enter or revise RT Bronchodilator order(s) to one equivalent RT bronchodilator order with QID Frequency and an Albuterol order with Frequency of every 4 hours PRN wheezing or increased work of breathing using Per Protocol order mode. Greater than 13 - enter or revise RT Bronchodilator order(s) to one equivalent RT bronchodilator order with every 4 hours Frequency and an Albuterol order with Frequency of every 2 hours PRN wheezing or increased work of breathing using Per Protocol order mode. RT to enter RT Home Evaluation for COPD & MDI Assessment order using Per Protocol order mode.     Electronically signed by Neeraj Escoto RCP on 4/1/2022 at 1:42 PM

## 2022-04-01 NOTE — PROGRESS NOTES
Physical Therapy  Facility/Department: [facility] C2 CARD TELEMETRY  Daily Treatment Note  NAME: Meme Boateng  : 1965  MRN: [id number]    Date of Service: 2022    Discharge Recommendations:  24 hour supervision or assist,Outpatient PT (outpatient cardiac rehabilitation when cleared by his surgeon)   PT Equipment Recommendations  Other: no DME needs at D/C  If pt is unable to be seen after this session, please let this note serve as discharge summary. Please see case management note for discharge disposition. Thank you. Assessment   Body structures, Functions, Activity limitations: Decreased functional mobility ; Decreased strength;Decreased endurance; Increased pain;Decreased posture  Assessment: Patient is progressing well with his therapy goals. Today he was able to complete transfers with supervision and ambulate 75 feet with no device with SBA. He did however need mod assist for bed mobility. Patient said that he will be sleeping in a recliner at home which should help address this issue. Patient's total ambulation today was limited by his pain, endurance and decreased oxygen saturation on exertion. Patient is safe for home, when medically stable, with 24/ supervision. Patient may benefit from outpatient cardiac rehabilitation when cleared by his surgeon. PT to continue to follow. Treatment Diagnosis: Decreased endurance and independence with mobility  Specific instructions for Next Treatment: Progress ther ex and mobility as tolerated  Prognosis: Good  Decision Making: Medium Complexity  PT Education: Goals; General Safety;Gait Training;PT Role;Plan of Care;Disease Specific Education; Functional Mobility Training;Precautions;Transfer Training;Energy Conservation;Weight-bearing Education;Home Exercise Program;Equipment  Patient Education: Disease-specific education: Pt educated on sternal precautions with transfers, role of PT in hospital, and safety in transfers/amb - pt verbalizes understanding. Barriers to Learning: none  REQUIRES PT FOLLOW UP: Yes  Activity Tolerance  Activity Tolerance: Patient Tolerated treatment well;Patient limited by pain; Patient limited by endurance  Activity Tolerance: Vitals: 114/68  96% on room air. 92 BPM. 02 dropped to 85% on room air on exertion. returned to 95% on room air with pursed lip breathing. post activity: 132/75 95 BPM     Patient Diagnosis(es): The primary encounter diagnosis was ST elevation myocardial infarction (STEMI), unspecified artery ([facility] [address] A diagnosis of Acute post-operative pain was also pertinent to this visit. has no past medical history on file. has a past surgical history that includes Coronary artery bypass graft (N/A, 3/29/2022). Restrictions  Restrictions/Precautions  Restrictions/Precautions: Cardiac,Fall Risk,General Precautions  Position Activity Restriction  Sternal Precautions: No Pushing,No Pulling,5# Lifting Restrictions  Other position/activity restrictions: telemetry  Subjective   General  Chart Reviewed: Yes  Response To Previous Treatment: Patient with no complaints from previous session. Family / Caregiver Present: Yes (wife)  Referring Practitioner: Blank Maxwell MD  Subjective  Subjective: Pt agreeable to work with PT. General Comment  Comments: Patient supine in bed upon entry of PT. Cleared for therapy by RN. Pain Screening  Patient Currently in Pain: Yes  Pain Assessment  Pain Assessment: 0-10  Pain Level: 4  Pain Type: Acute pain;Surgical pain  Pain Location: Sternum  Non-Pharmaceutical Pain Intervention(s): Ambulation/Increased Activity;Repositioned; Rest  Response to Pain Intervention: Patient Satisfied  Vital Signs  Patient Currently in Pain: Yes       Orientation     Cognition   Cognition  Overall Cognitive Status: WFL  Objective   Bed mobility  Supine to Sit: Moderate assistance  Sit to Supine: Unable to assess (patient seated in the chair upon exit of therapy)  Comment: head of bed elevated  Transfers  Sit to Stand: Supervision  Stand to sit: Supervision  Bed to Chair: Supervision  Comment: patient able to maintain her sternal precautions without cueing. Ambulation  Ambulation?: Yes  Ambulation 1  Surface: level tile  Device: No Device  Assistance: Stand by assistance  Quality of Gait: Patient ambulated with slow jaja with decreased stride length bilateral  Gait Deviations: Slow Jaja;Decreased step length  Distance: 15 feet. 75 feet. Comments: No complaints of shortness of breath, additional chest pain or dizziness. No loss of balance. Stairs/Curb  Stairs?: No (patient refused due to fatigue)     Balance  Posture: Good  Sitting - Static: Good  Sitting - Dynamic: Good;-  Standing - Static: Good;-  Standing - Dynamic: Good;-  Comments: no assistive device  Exercises  Hip Flexion: 1 x 15 bilateral  Knee Long Arc Quad: 1 x 15 bilateral  Ankle Pumps: 1 x 15 bilateral  Comments: cueing for sequencing and technique.   Other exercises  Other exercises?: No                     AM-PAC Score     AM-PAC Inpatient Mobility without Stair Climbing Raw Score : 15 (04/01/22 1650)  AM-PAC Inpatient without Stair Climbing T-Scale Score : 43.03 (04/01/22 1650)  Mobility Inpatient CMS 0-100% Score: 47.43 (04/01/22 1650)  Mobility Inpatient without Stair CMS G-Code Modifier : CK (04/01/22 1650)       Goals  Short term goals  Time Frame for Short term goals: 1 week, 4/06/22 (unless otherwise specified)  Short term goal 1: Pt will transfer supine <-> sit with supervision 4/1 mod assist  Short term goal 2: Pt will transfer sit <-> stand and bed>chair with modified(I) 4/1 supervision  Short term goal 3: Pt will ambulate x 300 feet without AD with modified(I) 4/1 75 feet with SBA  Short term goal 4: Pt will ambulate up/down 4 steps using 1 handrail as needed with modified(I) 4/1 patient refused  Short term goal 5: By 4/03/22: Pt will tolerate 12-15 reps BLE exercise for strengthening, balance, and endurance 4/1 goal met. Patient Goals   Patient goals : \"To be able to walk again and go home\"    Plan    Plan  Times per week: 5-7x/week in acute care  Times per day: Daily  Specific instructions for Next Treatment: Progress ther ex and mobility as tolerated  Current Treatment Recommendations: Strengthening,Balance Training,Functional Mobility Training,Transfer Training,Gait Training,Stair training,Endurance [employer] Exercise Program,Safety Education & Training  Safety Devices  Type of devices: All fall risk precautions in place,Gait belt,Patient at risk for falls,Call light within reach,Nurse notified,Left in chair (alarm off upon entry of therapy.  wife at bedside at end of session)     Therapy Time   Individual Concurrent Group Co-treatment   Time In 1617         Time Out 1640         Minutes 23         Timed Code Treatment Minutes: [address] [facility], PT

## 2022-04-01 NOTE — PLAN OF CARE
Problem: Nutrition  Goal: Optimal nutrition therapy  Outcome: Ongoing  Note: Nutrition Problem #1: Severe malnutrition  Intervention: Food and/or Nutrient Delivery: Continue Current Diet,Discontinue Oral Nutrition Supplement  Nutritional Goals: Pt will tolerate and consume 50% or greater of meals and ONS this admission

## 2022-04-01 NOTE — PROGRESS NOTES
Shift: [id number]      Patient: Richy Alcala  YOB: 1965  MRN: [id number]     Date of Procedure: 3/29/2022     Pre-Op Diagnosis: CAD     Post-Op Diagnosis: Same       Procedure(s):  CORONARY ARTERY BYPASS GRAFT X2, SAPHENOUS VEIN GRAFT, INTERNAL MAMMARY ARTERY, URSZULA, TEMPORARY PACING WIRE PLACEMENT, ON PUMP, LEFT ATRIAL APPENDAGE CLIP PLACEMENT, BILATERAL PECTORALIS BLOCKS AND PLATELET GEL APPLICATION     Surgeon(s):  Gicaomo Maldonado MD     Assistant:   Surgical Assistant: Rebecca Mitchell    Nursing assessment at handoff  stable    Rhythm changes Normal Sinus Rhythm     Rhythm Intervention None    Drop in Urinary Output no     Changes to drips?  none      LDA Insertion Date Date Changed (if needed) Discontinued Date   Art line 03/29/22     Central Line 03/29/22     ET Tube 03/29/22 03/29/22   Vila 03/29/22     Chest Tube 03/29/22     Leg Drain      Pacing Wires 03/29/22  3/30/22     Surgery, return to OR no     Hospital Course:  DOS  -Levophed off  -Dobutamine off  -Platelets x1 unit given  -Nonrebreather d/c'd and placed on NC  -CT output within limits  -Urine output within limits  -Transitioned at 1814  -PO aspirin and plavix given per MD order  * Dr. Jesse Schaumann to pull pacing wires during rounds on 3/30/22 *    POD#1  OOB to chair x 2, PT/OT, minimal CT output, Insulin gtt off    Patient on room air    POD#2  Nights: Room air to 1L NC.  Up to chair in am.       Changes in O2 requirements Oxygen Therapy  SpO2: 93 %  Pulse Oximeter Device Mode: Continuous  Pulse Oximeter Device Location: Finger  O2 Device: Nasal cannula  Skin Assessment: Clean, dry, & intact  Skin Protection for O2 Device: Yes  O2 Flow Rate (L/min): 1 L/min Room air  No increase in oxygen demand    Most recent vitals /75   Pulse 87   Temp 99.2 °F (37.3 °C) (Oral)   Resp 18   Ht 5' 11\" (1.803 m)   Wt 189 lb 8 oz (86 kg)   SpO2 93%   BMI 26.43 kg/m²       Admission weight Weight: 197 lb 15.6 oz (89.8 kg) Today's weight   Wt Readings from Last 1 Encounters:   04/01/22 189 lb 8 oz (86 kg)         Lab Data:  CBC:   Recent Labs     04/01/22  0500   WBC 11.4*   HGB 9.7*   HCT 28.9*   MCV 93.4        BMP:    Recent Labs     04/01/22  0500   *   K 4.4   CO2 27   BUN 23*   CREATININE 0.9     LIVR:   No results for input(s): AST, ALT in the last 72 hours.   PT/INR:   Recent Labs     03/29/22  1145   INR 1.33*     APTT:   Recent Labs     03/29/22  1145   APTT 36.8       Drip rates at handoff:    sodium chloride      dextrose           Electronically signed by Zarina Corona RN on 4/1/2022 at 6:45 AM

## 2022-04-01 NOTE — FLOWSHEET NOTE
Writer here at time of transfusion assisting SARAI Flores RN with transfusion. Transfusion stopped at 1230 on 3/29/2022.

## 2022-04-02 VITALS
BODY MASS INDEX: 25.99 KG/M2 | TEMPERATURE: 98 F | HEIGHT: 71 IN | RESPIRATION RATE: 18 BRPM | DIASTOLIC BLOOD PRESSURE: 78 MMHG | HEART RATE: 93 BPM | SYSTOLIC BLOOD PRESSURE: 109 MMHG | WEIGHT: 185.63 LBS | OXYGEN SATURATION: 97 %

## 2022-04-02 PROCEDURE — 99024 POSTOP FOLLOW-UP VISIT: CPT | Performed by: THORACIC SURGERY (CARDIOTHORACIC VASCULAR SURGERY)

## 2022-04-02 PROCEDURE — 6370000000 HC RX 637 (ALT 250 FOR IP): Performed by: THORACIC SURGERY (CARDIOTHORACIC VASCULAR SURGERY)

## 2022-04-02 PROCEDURE — 6370000000 HC RX 637 (ALT 250 FOR IP): Performed by: INTERNAL MEDICINE

## 2022-04-02 PROCEDURE — 6370000000 HC RX 637 (ALT 250 FOR IP): Performed by: STUDENT IN AN ORGANIZED HEALTH CARE EDUCATION/TRAINING PROGRAM

## 2022-04-02 PROCEDURE — 2580000003 HC RX 258: Performed by: INTERNAL MEDICINE

## 2022-04-02 PROCEDURE — 6370000000 HC RX 637 (ALT 250 FOR IP): Performed by: NURSE PRACTITIONER

## 2022-04-02 RX ADMIN — FAMOTIDINE 20 MG: 20 TABLET, FILM COATED ORAL at 09:09

## 2022-04-02 RX ADMIN — CLOPIDOGREL BISULFATE 75 MG: 75 TABLET ORAL at 09:08

## 2022-04-02 RX ADMIN — ASPIRIN 81 MG: 81 TABLET, COATED ORAL at 09:08

## 2022-04-02 RX ADMIN — Medication 1 TABLET: at 09:09

## 2022-04-02 RX ADMIN — METOPROLOL TARTRATE 12.5 MG: 25 TABLET, FILM COATED ORAL at 09:09

## 2022-04-02 RX ADMIN — Medication 15 ML: at 09:10

## 2022-04-02 RX ADMIN — SODIUM CHLORIDE, PRESERVATIVE FREE 10 ML: 5 INJECTION INTRAVENOUS at 09:10

## 2022-04-02 RX ADMIN — MAGNESIUM CITRATE 296 ML: 1.75 LIQUID ORAL at 11:18

## 2022-04-02 RX ADMIN — GABAPENTIN 100 MG: 100 CAPSULE ORAL at 09:09

## 2022-04-02 RX ADMIN — POLYETHYLENE GLYCOL 3350 17 G: 17 POWDER, FOR SOLUTION ORAL at 09:04

## 2022-04-02 RX ADMIN — MUPIROCIN: 20 OINTMENT TOPICAL at 09:11

## 2022-04-02 ASSESSMENT — PAIN SCALES - GENERAL: PAINLEVEL_OUTOF10: 3

## 2022-04-02 NOTE — PROGRESS NOTES
Pt with orders to d/c home with Dex Fernandes. AVS and post-CABG care reviewed with pt and spouse. PIV removed per protocol. Pt transported to front entrance via wheelchair at this time and is now d/c'd home.

## 2022-04-02 NOTE — PROGRESS NOTES
Progress Note    S/P CORONARY ARTERY BYPASS GRAFT X2, SAPHENOUS VEIN GRAFT, INTERNAL MAMMARY ARTERY, URSZULA, TEMPORARY PACING WIRE PLACEMENT, ON PUMP, LEFT ATRIAL APPENDAGE CLIP PLACEMENT, BILATERAL PECTORALIS BLOCKS AND PLATELET GEL APPLICATION 6/97/19    Vital Signs:                                                 /78   Pulse 91   Temp 98 °F (36.7 °C) (Oral)   Resp 18   Ht 5' 11\" (1.803 m)   Wt 185 lb 10 oz (84.2 kg)   SpO2 97%   BMI 25.89 kg/m²  O2 Flow Rate (L/min): 1 L/min   SR  Admission Weight: 197 lb 15.6 oz (89.8 kg)  DOS 84.6kg    I/O:    Intake/Output Summary (Last 24 hours) at 4/2/2022 1039  Last data filed at 4/2/2022 0500  Gross per 24 hour   Intake 720 ml   Output 575 ml   Net 145 ml     CV: reg, wound c/d/i  Pulm: decreased  Abd: soft  Ext: warm, edema    Data Review:  CBC:   Recent Labs     03/31/22  0550 04/01/22  0500   WBC 13.5* 11.4*   HGB 10.1* 9.7*   HCT 29.8* 28.9*   MCV 92.7 93.4    200     BMP:   Recent Labs     03/31/22  0550 04/01/22  0500   * 135*   K 4.3 4.4   CL 98* 99   CO2 25 27   BUN 20 23*   CREATININE 0.9 0.9   CALCIUM 8.1* 8.4   MG 2.00 2.10     Cardiac Enzymes: No results for input(s): CKTOTAL, CKMB, CKMBINDEX, TROPONINI in the last 72 hours. PT/INR: No results for input(s): PROTIME, INR in the last 72 hours. APTT: No results for input(s): APTT in the last 72 hours. Assessment/Plan:  CV - SR   - statin  pulm - off O2  Renal - Cr nl yesterday. Below preop weight.   GI - bowel meds  Heme - ASA 81, plavix for CAD   - refused arixtra this morning  Refused labs today  dispo - d/c home once bowels move      Erika Case MD  4/2/2022  10:39 AM

## 2022-04-02 NOTE — CARE COORDINATION
CASE MANAGEMENT DISCHARGE SUMMARY      Discharge to: Home with [facility]    Transportation:    Family/car: yes     Confirmed discharge plan with:     Patient: yes per RN     Family:  Per pt     Facility/Agency, name: [facility] faxed     RN, name: Felipa Meigs RN    Note: Discharging nurse to complete GAYATHRI, reconcile AVS, and place final copy with patient's discharge packet. RN to ensure that written prescriptions for  Level II medications are sent with patient to the facility as per protocol.

## 2022-04-02 NOTE — PROGRESS NOTES
Shift: [id number]      Patient: Jane Guerrero  YOB: 1965  MRN: [id number]     Date of Procedure: 3/29/2022     Pre-Op Diagnosis: CAD     Post-Op Diagnosis: Same       Procedure(s):  CORONARY ARTERY BYPASS GRAFT X2, SAPHENOUS VEIN GRAFT, INTERNAL MAMMARY ARTERY, URSZULA, TEMPORARY PACING WIRE PLACEMENT, ON PUMP, LEFT ATRIAL APPENDAGE CLIP PLACEMENT, BILATERAL PECTORALIS BLOCKS AND PLATELET GEL APPLICATION     Surgeon(s):  Rob Vasquez MD     Assistant:   Surgical Assistant: Batsheva Merino; Emily Escobar    Nursing assessment at handoff  stable    Rhythm changes Normal Sinus Rhythm     Rhythm Intervention None    Drop in Urinary Output no     Changes to drips?  none      LDA Insertion Date Date Changed (if needed) Discontinued Date   Art line 03/29/22     Central Line 03/29/22     ET Tube 03/29/22 03/29/22   Vila 03/29/22     Chest Tube 03/29/22     Leg Drain      Pacing Wires 03/29/22  3/30/22     Surgery, return to OR no     Hospital Course:  DOS  -Levophed off  -Dobutamine off  -Platelets x1 unit given  -Nonrebreather d/c'd and placed on NC  -CT output within limits  -Urine output within limits  -Transitioned at 1814  -PO aspirin and plavix given per MD order  * Dr. John Juares to pull pacing wires during rounds on 3/30/22 *    POD#1  OOB to chair x 2, PT/OT, minimal CT output, Insulin gtt off    Patient on room air    POD#2  Nights: Room air to 1L NC. Up to chair in am.     POD#3 Nights:  Patient rested in bedside chair overnight. No signs or symptoms of distress. Ambulated to restroom 3 times. No additional wants or needs. Refused morning labs.       Changes in O2 requirements Oxygen Therapy  SpO2: (!) 89 %  Pulse Oximeter Device Mode: Continuous  Pulse Oximeter Device Location: Finger  O2 Device: None (Room air)  Skin Assessment: Clean, dry, & intact  Skin Protection for O2 Device: Yes  O2 Flow Rate (L/min): 1 L/min Room air  No increase in oxygen demand    Most recent vitals BP (!) 128/58 Pulse 89   Temp 98 °F (36.7 °C)   Resp 18   Ht 5' 11\" (1.803 m)   Wt 185 lb 10 oz (84.2 kg)   SpO2 (!) 89%   BMI 25.89 kg/m²       Admission weight Weight: 197 lb 15.6 oz (89.8 kg)     Today's weight   Wt Readings from Last 1 Encounters:   04/02/22 185 lb 10 oz (84.2 kg)         Lab Data:  CBC:   Recent Labs     04/01/22  0500   WBC 11.4*   HGB 9.7*   HCT 28.9*   MCV 93.4        BMP:    Recent Labs     04/01/22  0500   *   K 4.4   CO2 27   BUN 23*   CREATININE 0.9     LIVR:   No results for input(s): AST, ALT in the last 72 hours. PT/INR:   No results for input(s): PROT, INR in the last 72 hours. APTT:   No results for input(s): APTT in the last 72 hours.     Drip rates at handoff:    sodium chloride           Electronically signed by Natalie Scott RN on 4/2/2022 at 6:44 AM

## 2022-04-06 ENCOUNTER — TELEPHONE (OUTPATIENT)
Dept: CARDIOTHORACIC SURGERY | Age: 57
End: 2022-04-06

## 2022-04-06 NOTE — DISCHARGE SUMMARY
Cardiac, Vascular & Thoracic Surgery  Discharge Summary    Patient:  Leti Leslie 1965 [id number]   Admission Date:  3/24/2022 11:59 AM  Discharge Date:  4/2/2022 MP     Principle Diagnosis:  CAD in native artery    Secondary Diagnosis:  Principal Problem:    CAD in native artery  Active Problems:    S/P CABG x 3    Ischemic cardiomyopathy    Pure hypercholesterolemia    Tobacco abuse    Severe malnutrition (HCC)  Resolved Problems:    STEMI (ST elevation myocardial infarction) ([address]    Cardiac Cath: 3/24/22   FINDINGS      LVGRAM     LVEDP  17   GRADIENT ACROSS AORTIC VALVE  none   LV FUNCTION EF 35%   WALL MOTION  inferior hypokinesis   MITRAL REGURGITATION  mild         CORONARY ARTERIES     LM  Less than 10% proximal stenosis, mid 20-30% stenosis. There is distal 70% stenosis.       LAD  Proximal 20% stenosis, mid 60 to 70% stenosis, distal less than 10% stenosis.     D1 has 20% pkorhyhm-tha-mgllao stenosis.       LCX  Less than 10% qkehzbsc-tcg-qzrbxb stenosis.       RI  Difficult to fully visualize on study, is a small to medium size vessel, there does not appear to be severe obstruction, there does appear to be less than 10% mzebpvxt-zpv-efbwcc stenosis.       RCA Dominant, large vessel, proximal-mid 100% occlusion, distal vessel was ultimately visualized on completion angiography is less than 10% stenosis. There is thrombus present in the mid vessel.        Procedure: 3/29/22 CORONARY ARTERY BYPASS GRAFT X2, SAPHENOUS VEIN GRAFT, INTERNAL MAMMARY ARTERY, URSZULA, TEMPORARY PACING WIRE PLACEMENT, ON PUMP, LEFT ATRIAL APPENDAGE CLIP PLACEMENT, BILATERAL PECTORALIS BLOCKS AND PLATELET GEL APPLICATION     History:  The patient is a 56 y.o. male , current smoker, with no significant past medical history documented who presented to [facility] ED with complaints of chest pain that started while he was outside [facility]. He states he initially developed shortness of breath shortly after edging.  He then developed chest tightness. He denies nausea or dizziness. He did experience some diaphoresis. His mother has a history of CAD. EKG obtained by EMS showed STEMI. Heparin gtt initiated and brilinta given in ED. The patient was taken emergently for LHC which showed severe multivessel disease, including LM disease. We have been consulted for surgical revascularization     Hospital Course: The post operative period for this patient was uneventful. The patient was discharged on 4/2/2022 and will follow up in the office in one week. Discharged Condition: stable    Disposition:  Home with [facility]    Medications:  Aspirin:start  ADP Inhibitor (plavix/brillinta/effient):start   ACE/ARB: contraindicated 2/2 hypotension  Betablocker:start  Statin:start    Discharge Medications:     Medication List      START taking these medications    aspirin 81 MG EC tablet  Take 1 tablet by mouth daily     atorvastatin 80 MG tablet  Commonly known as: LIPITOR  Take 1 tablet by mouth nightly     clopidogrel 75 MG tablet  Commonly known as: PLAVIX  Take 1 tablet by mouth daily     famotidine 20 MG tablet  Commonly known as: PEPCID  Take 1 tablet by mouth daily     furosemide 20 MG tablet  Commonly known as: LASIX  Take 1 tablet by mouth daily for 7 days     magnesium oxide 400 (240 Mg) MG tablet  Commonly known as: MAG-OX  Take 1 tablet by mouth daily for 7 days     metoprolol tartrate 25 MG tablet  Commonly known as: LOPRESSOR  Take 0.5 tablets by mouth 2 times daily Hold for SBP <100 or HR <65     oxyCODONE 5 MG immediate release tablet  Commonly known as: ROXICODONE  Take 1 tablet by mouth every 6 hours as needed for Pain for up to 7 days.      polyethylene glycol 17 g packet  Commonly known as: GLYCOLAX  Take 17 g by mouth daily as needed for Constipation     potassium chloride 10 MEQ extended release tablet  Commonly known as: KLOR-CON M  Take 1 tablet by mouth daily for 7 days           Where to Get Your Medications      You can get these medications from any pharmacy    Bring a paper prescription for each of these medications  · aspirin 81 MG EC tablet  · atorvastatin 80 MG tablet  · clopidogrel 75 MG tablet  · famotidine 20 MG tablet  · furosemide 20 MG tablet  · magnesium oxide 400 (240 Mg) MG tablet  · metoprolol tartrate 25 MG tablet  · oxyCODONE 5 MG immediate release tablet  · polyethylene glycol 17 g packet  · potassium chloride 10 MEQ extended release tablet          Patient Instructions: Activity: DO NOT LIFT, PUSH, OR PULL ANYTHING OVER 5 POUNDS FOR 6 WEEK from the day of surgery  Diet:  cardiac diet  Wound Care:  [address] YOUR INCISIONS DAILY WITH A CLEAN WASHCLOTH AND ANTIBACTERIAL SOAP. Do not wash your incisions after you have cleansed other parts of your body    Follow up with Cardiothoracic Surgeon, Dr. Rosemarie Randolph on 4/11/22 at 09:45 am.  Follow up with Cardiologist, Dr. Seth Kelly in 1-2 weeks.       Jovany Florez, APRN - CNP

## 2022-04-06 NOTE — TELEPHONE ENCOUNTER
Patient is s/p CABGx2, ZUNILDA clip 3/29/22. I called to check in on his recovery thus far. I spoke with his wife Delmy Murdock who reports that he is doing ok. Denies any SOB. He is using his IS 2-3 times daily. I've asked him to increase this to at least 6 times day. He is occasionally coughing up some sputum. He has not resumed smoking. Denies any redness or purulence to incisions and is cleaning them daily. Denies fevers. BP today was 113/68, HR 84. Patient is losing a good amount of weight and is having some dizziness. He is on Lasix 20 mg daily x 7 days. His weight today is 176.8 lb. Pre-op 197 lb, D/C 185 lb, 1st weight at home 184 lb. She denies any swelling in lower legs/feet/ankles. Instructed for patient to stop Lasix and to contact the office with > 3 lb weight gain in 24 hours. He is doing his walking plan. Appetite is fair. He has not had a bowel movement in 2 days. He is taking Miralax daily. I suggested that he add Colace daily as well. Pain is well controlled and he is weaning down on his Oxycodone. He does have some difficulty with sleep. He is cat napping throughout the day. I've asked that he cut out all naps. If he still has trouble sleeping he can try Tylenol PM or Benadryl. They were instructed to have patient get a CXR prior OV on Monday. They know to contact the office with any questions or concerns in the meantime.

## 2022-04-11 ENCOUNTER — OFFICE VISIT (OUTPATIENT)
Dept: CARDIOTHORACIC SURGERY | Age: 57
End: 2022-04-11

## 2022-04-11 ENCOUNTER — HOSPITAL ENCOUNTER (OUTPATIENT)
Dept: GENERAL RADIOLOGY | Age: 57
Discharge: HOME | End: 2022-04-11
Payer: COMMERCIAL

## 2022-04-11 ENCOUNTER — HOSPITAL ENCOUNTER (OUTPATIENT)
Age: 57
Discharge: HOME | End: 2022-04-11
Payer: COMMERCIAL

## 2022-04-11 VITALS
HEART RATE: 84 BPM | BODY MASS INDEX: 24.5 KG/M2 | WEIGHT: 175 LBS | TEMPERATURE: 98 F | OXYGEN SATURATION: 95 % | SYSTOLIC BLOOD PRESSURE: 120 MMHG | DIASTOLIC BLOOD PRESSURE: 60 MMHG | HEIGHT: 71 IN

## 2022-04-11 DIAGNOSIS — Z95.1 S/P CABG (CORONARY ARTERY BYPASS GRAFT): ICD-10-CM

## 2022-04-11 DIAGNOSIS — Z95.1 S/P CABG X 3: Primary | ICD-10-CM

## 2022-04-11 PROCEDURE — 71046 X-RAY EXAM CHEST 2 VIEWS: CPT

## 2022-04-11 PROCEDURE — 99024 POSTOP FOLLOW-UP VISIT: CPT | Performed by: STUDENT IN AN ORGANIZED HEALTH CARE EDUCATION/TRAINING PROGRAM

## 2022-04-11 RX ORDER — ACETAMINOPHEN 500 MG
1000 TABLET ORAL EVERY 6 HOURS PRN
COMMUNITY

## 2022-04-11 NOTE — PROGRESS NOTES
Cardiac, Vascular and Thoracic Surgeons   Post-opClinic Note     4/11/2022 10:00 AM  Surgeon:  Juwan Postal     S/P :  CABGx2, ZUNILDA clip 3/29/22     Chief complaint : 1st post op  Subjective:  Mr. Kimberly Hazel is recovering well. His MSI, CT sites, and right leg incision are healing without erythema or purulence. Sutures x3 were removed without difficulty. He does still have some SOB but this is improving with his IS use. He is now reaching between 5217-9515 ml. Pain is well controlled. He is scheduled with Cardiology Marquez Stahl NP on 4/25/22. Vital Signs: /60 (Site: Left Upper Arm, Position: Sitting)   Pulse 84   Temp 98 °F (36.7 °C) (Infrared)   Ht 5' 11\" (1.803 m)   Wt 175 lb (79.4 kg)   SpO2 95%   BMI 24.41 kg/m²      I/O:  No intake or output data in the 24 hours ending 04/11/22 1000    Exam:   Physical Exam well healed, sternum stable      Chest X-Ray:  normal     Labs:   CBC: No results for input(s): WBC, HGB, HCT, MCV, PLT in the last 72 hours. BMP: No results for input(s): NA, K, CL, CO2, PHOS, BUN, CREATININE, CA in the last 72 hours. PT/INR: No results for input(s): PROTIME, INR in the last 72 hours. APTT: No results for input(s): APTT in the last 72 hours. No results found for this or any previous visit from the past 90 days. Scheduled Meds:     Patient Active Problem List   Diagnosis    Acute myocardial infarction ([address]    CAD in native artery    S/P CABG x 3    Ischemic cardiomyopathy    Pure hypercholesterolemia    Tobacco abuse    Severe malnutrition (HCC)       Assessment/Plan:   1. CAD - s/p CABG x2. PCI at the time. Continue ASA/plavix for a year. Follow with cardiology soon. [place]  RTC 1 month       Jayden Gill MD

## 2022-04-20 ENCOUNTER — TELEPHONE (OUTPATIENT)
Dept: CARDIOTHORACIC SURGERY | Age: 57
End: 2022-04-20

## 2022-04-20 NOTE — TELEPHONE ENCOUNTER
I called patient and spoke with his wife Sheri Vale to see if they had found a PCP yet for patient. She reports that it is on her list of things to do this week. I provided them with the phone number for [phone] [facility] which is convenient location wise.

## 2022-04-25 ENCOUNTER — OFFICE VISIT (OUTPATIENT)
Dept: CARDIOLOGY CLINIC | Age: 57
End: 2022-04-25
Payer: COMMERCIAL

## 2022-04-25 VITALS
SYSTOLIC BLOOD PRESSURE: 100 MMHG | HEIGHT: 72 IN | BODY MASS INDEX: 23.35 KG/M2 | OXYGEN SATURATION: 98 % | WEIGHT: 172.4 LBS | DIASTOLIC BLOOD PRESSURE: 64 MMHG | HEART RATE: 86 BPM

## 2022-04-25 DIAGNOSIS — I25.83 CORONARY ARTERY DISEASE DUE TO LIPID RICH PLAQUE: ICD-10-CM

## 2022-04-25 DIAGNOSIS — I21.3 ST ELEVATION MYOCARDIAL INFARCTION (STEMI), SUBSEQUENT EPISODE OF CARE (HCC): Primary | ICD-10-CM

## 2022-04-25 DIAGNOSIS — I25.10 CORONARY ARTERY DISEASE DUE TO LIPID RICH PLAQUE: ICD-10-CM

## 2022-04-25 PROCEDURE — 99215 OFFICE O/P EST HI 40 MIN: CPT | Performed by: NURSE PRACTITIONER

## 2022-04-25 RX ORDER — FAMOTIDINE 20 MG/1
20 TABLET, FILM COATED ORAL DAILY
Qty: 30 TABLET | Refills: 2 | Status: SHIPPED | OUTPATIENT
Start: 2022-04-25 | End: 2022-08-11

## 2022-04-25 RX ORDER — ASPIRIN 81 MG/1
81 TABLET ORAL DAILY
Qty: 30 TABLET | Refills: 11 | Status: SHIPPED | OUTPATIENT
Start: 2022-04-25 | End: 2022-08-11

## 2022-04-25 RX ORDER — METOPROLOL SUCCINATE 25 MG/1
25 TABLET, EXTENDED RELEASE ORAL DAILY
Qty: 30 TABLET | Refills: 11 | Status: SHIPPED | OUTPATIENT
Start: 2022-04-25

## 2022-04-25 RX ORDER — CLOPIDOGREL BISULFATE 75 MG/1
75 TABLET ORAL DAILY
Qty: 30 TABLET | Refills: 11 | Status: SHIPPED | OUTPATIENT
Start: 2022-04-25 | End: 2022-08-11

## 2022-04-25 RX ORDER — ATORVASTATIN CALCIUM 80 MG/1
80 TABLET, FILM COATED ORAL NIGHTLY
Qty: 30 TABLET | Refills: 11 | Status: SHIPPED | OUTPATIENT
Start: 2022-04-25 | End: 2022-08-11

## 2022-04-25 ASSESSMENT — ENCOUNTER SYMPTOMS
GASTROINTESTINAL NEGATIVE: 1
SHORTNESS OF BREATH: 1

## 2022-04-25 NOTE — PATIENT INSTRUCTIONS
Cardiac rehab referral  Blood work in 1-2 months  Change metoprolol to toprol 25 mg daily (day or night)  Check BP at home and call the office if consistently out of goal range; check 2-3 days per week  Follow up in 1 month

## 2022-04-25 NOTE — PROGRESS NOTES
[address]   Cardiology Note              Date:  April 25, 2022  Patientname: Emiliano Pereira  YOB: 1965    Primary Care physician: No primary care provider on file. HISTORY OF PRESENT ILLNESS: Emiliano Pereira is a 56 y.o. male with a history of CAD, ischemic cardiomyopathy, HLD. He presented 3/24/2022 for chest pain and found to have inferior STEMI. LHC showed 100% RCA treated with DANIA. Noted to have LM disease, CT surgery consulted. Echo showed EF 40-45%. On 3/29/2022 he had CABG x2 and ZUNILDA clip. Today he presents for hospital follow up for STEMI/CAD s/p PCI and CABG. He feels he is recovering slowly, has good and bad days. He has had no further chest pain similar to MI. He does have chest soreness. He has leg and chest numbness. Palpitations and dizziness has improved over the past few weeks. He has occasional shortness of breath, worse during showers. He is walking around the house and at the grocery. Home BP 110s-110s/70s, HR 70s. He stopped smoking. He is following a healthy diet. He owns a [address] Office weight today 4/25/2022: 172 lbs  Discharge weight 4/2/2022: 185 lbs    Past Medical History:   has no past medical history on file. Past Surgical History:   has a past surgical history that includes Coronary artery bypass graft (N/A, 3/29/2022). Home Medications:    Prior to Admission medications    Medication Sig Start Date End Date Taking?  Authorizing Provider   OXYCODONE HCL PO Take 10 mg by mouth every 6 hours as needed    Historical Provider, MD   acetaminophen (TYLENOL) 500 MG tablet Take 1,000 mg by mouth every 6 hours as needed for Pain    Historical Provider, MD   aspirin 81 MG EC tablet Take 1 tablet by mouth daily 4/2/22 5/2/22  JESSICA Singh CNP   atorvastatin (LIPITOR) 80 MG tablet Take 1 tablet by mouth nightly 4/1/22 5/1/22  JESSICA Singh CNP   metoprolol tartrate (LOPRESSOR) 25 MG tablet Take 0.5 tablets inferior, inferoseptal wall segments.   Normal left ventricular diastolic filling pressures.   The right ventricle is normal in size and function.   Trace mitral and tricuspid valve regurgitation.   Systolic pulmonary artery pressure (sPAP) is normal and estimated at 23 mmHg   (right atrial pressure 3 mmHg)   Definity® used for myocardial border enhancement.     Coronary angiogram 3/24/2022:  Acute inferior STEMI  PROCEDURES PERFORMED    Left heart catheterization  LVgram  Coronary angiogam  Coronary cath  Monitoring of moderate conscious sedation   Mechanical thrombectomy of RCA  IVUS of RCA  PCI of RCA with single drug-eluting sten  PROCEDURE DESCRIPTION    This was felt to be an emergency procedure.  Patient was prepped draped in the usual sterile fashion.  Local anaesthetic was applied over puncture site.  Using a back wall technique, a 6 Faroese Terumo sheath was inserted into right radial artery.  Verapamil, nitroglycerin, nicardipine were administered through the sheath.  Heparin was administered.  Diagnostic 5 Cuban pigtail, Ultram catheters were used for diagnostic angiograms.  At the conclusion of the procedure, a TR band was placed over the puncture site and hemostasis was obtained.  There were no immediate complications. I supervised sedation from 12:17 PM to 1:08 PM with versed 6 mg/fentanyl 200 mcg during the procedure. An independent trained observer pushed meds at my direction.  We monitored the patient's level of consciousness and vital signs/physiologic status throughout the procedure duration (see times listed previously).  130 cc contrast was utilized. <20cc EBL. FINDINGS      LVEDP  17   GRADIENT ACROSS AORTIC VALVE  none   LV FUNCTION EF 35%   WALL MOTION  inferior hypokinesis   MITRAL REGURGITATION  mild      LM  Less than 10% proximal stenosis, mid 20-30% stenosis.  There is distal 70% stenosis.          LAD  Proximal 20% stenosis, mid 60 to 70% stenosis, distal less than 10% stenosis.     D1 has 20% oddhhems-rdh-fapgnk stenosis.       LCX  Less than 10% jobsfsrx-xvk-kxdlqi stenosis.       RI  Difficult to fully visualize on study, is a small to medium size vessel, there does not appear to be severe obstruction, there does appear to be less than 10% mvjiaziy-wgs-grmmjc stenosis.             RCA Dominant, large vessel, proximal-mid 100% occlusion, distal vessel was ultimately visualized on completion angiography is less than 10% stenosis.  There is thrombus present in the mid vessel.      PERCUTANEOUS INTERVENTION DESCRIPTION    Heparin was used for any coagulation, patient been preloaded with Brilinta, Integrilin was given during the procedure.  Initially a 6 [employer] JR 5 guiding catheter used to intubate the RCA.  A [employer]2Sync wire was used to cross the lesion.  Mechanical thrombectomy was performed with the penumbra device.  Flow was restored to this. Rosalene Gloria was performed which showed thrombus/dissected plaque in the mid vessel and this was then treated with stenting with Medtronic resolute [place] 4 x 38 mm drug-eluting stent.  Stent was postdilated with 4.5 and 5 mm noncompliant balloons.  Follow-up IVUS showed good stent apposition/expansion. Amedeo Nails was no residual dissection or thrombus noted. CONCLUSIONS:    Severe left main disease  Successful PCI of RCA with single drug-eluting stent   Continue Integrilin indefinitely, will consult with CT surgery, case discussed with Dr. Webber in Cath Lab, plan for CABG at some point in the future. Cardiology Labs Reviewed:   CBC: No results for input(s): WBC, HGB, HCT, PLT in the last 72 hours. BMP:No results for input(s): NA, K, CO2, BUN, CREATININE, LABGLOM, GLUCOSE in the last 72 hours. PT/INR: No results for input(s): PROTIME, INR in the last 72 hours. APTT:No results for input(s): APTT in the last 72 hours.   FASTING LIPID PANEL:  Lab Results   Component Value Date    HDL 33 03/29/2022    LDLCALC 121 03/29/2022    TRIG 132 03/29/2022

## 2022-05-09 ENCOUNTER — OFFICE VISIT (OUTPATIENT)
Dept: CARDIOTHORACIC SURGERY | Age: 57
End: 2022-05-09

## 2022-05-09 VITALS
BODY MASS INDEX: 23.86 KG/M2 | HEART RATE: 77 BPM | WEIGHT: 176.2 LBS | DIASTOLIC BLOOD PRESSURE: 82 MMHG | SYSTOLIC BLOOD PRESSURE: 130 MMHG | TEMPERATURE: 98.4 F | HEIGHT: 72 IN | OXYGEN SATURATION: 99 %

## 2022-05-09 DIAGNOSIS — Z95.1 S/P CABG (CORONARY ARTERY BYPASS GRAFT): Primary | ICD-10-CM

## 2022-05-09 PROCEDURE — 99024 POSTOP FOLLOW-UP VISIT: CPT | Performed by: STUDENT IN AN ORGANIZED HEALTH CARE EDUCATION/TRAINING PROGRAM

## 2022-05-09 NOTE — PROGRESS NOTES
Cardiac, Vascular and Thoracic Surgeons   Post-opClinic Note     5/9/2022 1:03 PM  Surgeon:  John Juares     S/P :  2nd post -op s/p CABG x2, ZUNILDA clip on 3/29/22    Chief complaint : 2nd post-op  Subjective:  Mr. Yanelis Montaño, still having shortness of breath when ambulating, c/o of some chest discomfort, surgical incision healling well both sternal incision and right leg incisions. Vital Signs: /82 (Site: Right Upper Arm, Position: Sitting)   Pulse 77   Temp 98.4 °F (36.9 °C) (Infrared)   Ht 5' 11.75\" (1.822 m)   Wt 176 lb 3.2 oz (79.9 kg)   SpO2 99%   BMI 24.06 kg/m²      I/O:  No intake or output data in the 24 hours ending 05/09/22 1303    Exam:   Physical Exam      Chest X-Ray:  normal     Labs:   CBC: No results for input(s): WBC, HGB, HCT, MCV, PLT in the last 72 hours. BMP: No results for input(s): NA, K, CL, CO2, PHOS, BUN, CREATININE, CA in the last 72 hours. PT/INR: No results for input(s): PROTIME, INR in the last 72 hours. APTT: No results for input(s): APTT in the last 72 hours. No results found for this or any previous visit from the past 90 days. Scheduled Meds:     Patient Active Problem List   Diagnosis    Acute myocardial infarction ([facility]    CAD in native artery    S/P CABG x 3    Ischemic cardiomyopathy    Pure hypercholesterolemia    Tobacco abuse    Severe malnutrition (HCC)       Assessment/Plan:   1. CAD - s/p CABG. Cont asa/plavix. Went over some non specific complaints. Has quit smoking. Cardiac rehab to start end of may. Sternal precautions until 6/29/22.  Follow as neededd      Ky Argueta MD

## 2022-05-25 ENCOUNTER — HOSPITAL ENCOUNTER (OUTPATIENT)
Dept: CARDIAC REHAB | Age: 57
Setting detail: THERAPIES SERIES
Discharge: HOME | End: 2022-05-25

## 2022-05-25 ASSESSMENT — ENCOUNTER SYMPTOMS: GASTROINTESTINAL NEGATIVE: 1

## 2022-05-25 NOTE — PROGRESS NOTES
[address]   Cardiology Note              Date:  May 25, 2022  Patientname: Radhames Lundy  YOB: 1965    Primary Care physician: MANAS Baxter    HISTORY OF PRESENT ILLNESS: Radhames Lundy is a 56 y.o. male with a history of CAD, ischemic cardiomyopathy, HLD. He presented 3/24/2022 for chest pain and found to have inferior STEMI. LHC showed 100% RCA treated with DANIA. Noted to have LM disease, CT surgery consulted. Echo showed EF 40-45%. On 3/29/2022 he had CABG x2 and ZUNILDA clip. Today he presents for follow up for CAD and ischemic cardiomyopathy. He is now feeling better than he has felt in a long time, recovering well from CABG. He started cardiac rehab and is doing well. He has occasional palpitations. Shortness of breath and chest pain have improved though he has sternal tightness. He has left outer thigh numbness that comes and goes. 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 He stopped smoking after MI and CABG. Office weight today 5/26/2022: 176 lbs  Discharge weight 4/2/2022: 185 lbs    Past Medical History:   has no past medical history on file. Past Surgical History:   has a past surgical history that includes Coronary artery bypass graft (N/A, 3/29/2022). Home Medications:    Prior to Admission medications    Medication Sig Start Date End Date Taking?  Authorizing Provider   metoprolol succinate (TOPROL XL) 25 MG extended release tablet Take 1 tablet by mouth daily 4/25/22   JESSICA Jimenez CNP   famotidine (PEPCID) 20 MG tablet Take 1 tablet by mouth daily 4/25/22 5/25/22  JESSICA Jimenez CNP   clopidogrel (PLAVIX) 75 MG tablet Take 1 tablet by mouth daily 4/25/22 5/25/22  JESSICA Jimenez CNP   atorvastatin (LIPITOR) 80 MG tablet Take 1 tablet by mouth nightly 4/25/22 5/25/22  JESSICA Jimenez CNP   aspirin 81 MG EC tablet Take 1 tablet by mouth daily 4/25/22 5/25/22  JESSICA Jimenez CNP OXYCODONE HCL PO Take 10 mg by mouth every 6 hours as needed  Patient not taking: Reported on 5/9/2022    Historical Provider, MD   acetaminophen (TYLENOL) 500 MG tablet Take 1,000 mg by mouth every 6 hours as needed for Pain    Historical Provider, MD     Allergies:  Patient has no known allergies. Social History:   reports that he quit smoking about 2 months ago. He has never used smokeless tobacco.     Family History: family history is not on file. Review of Systems   Review of Systems   Constitutional: Negative for fatigue. Respiratory: Negative for shortness of breath. Cardiovascular: Positive for palpitations. Negative for chest pain and leg swelling. Gastrointestinal: Negative. Neurological: Negative.       OBJECTIVE:    Vital signs:    /68   Pulse 73   Ht 5' 11.75\" (1.822 m)   Wt 176 lb (79.8 kg)   SpO2 96%   BMI 24.04 kg/m²      Physical Exam:  Constitutional:  Comfortable and alert, NAD, appears stated age  Eyes: PERRL, sclera nonicteric  Neck:  Supple, no masses, no thyroidmegaly, no JVD  Skin:  Warm and dry; no rash or lesions; +sternal incision, healing well, no erythema, drainage, heat  Heart: Regular, normal apex, S1 and S2 normal, no M/G/R  Lungs:  Normal respiratory effort; clear; no wheezing/rhonchi/rales  Abdomen: soft, non tender, + bowel sounds  Extremities:  No edema or cyanosis; no clubbing  Neuro: alert and oriented, moves legs and arms equally, normal mood and affect    Data Reviewed:      CABG 3/29/2022:   3/29 CORONARY ARTERY BYPASS GRAFT X2, SAPHENOUS VEIN GRAFT, INTERNAL MAMMARY ARTERY, URSZULA, TEMPORARY PACING WIRE PLACEMENT, ON PUMP, LEFT ATRIAL APPENDAGE CLIP PLACEMENT, BILATERAL PECTORALIS BLOCKS AND PLATELET GEL APPLICATION    Echo 4/58/0370:  Normal left ventricle size, wall thickness, and systolic function with an   estimated ejection fraction of 40-45%.   Mild hypokinesis of the inferior, inferoseptal wall segments.   Normal left ventricular diastolic filling pressures.   The right ventricle is normal in size and function.   Trace mitral and tricuspid valve regurgitation.   Systolic pulmonary artery pressure (sPAP) is normal and estimated at 23 mmHg   (right atrial pressure 3 mmHg)   Definity® used for myocardial border enhancement.     Coronary angiogram 3/24/2022:  Acute inferior STEMI  PROCEDURES PERFORMED    Left heart catheterization  LVgram  Coronary angiogam  Coronary cath  Monitoring of moderate conscious sedation   Mechanical thrombectomy of RCA  IVUS of RCA  PCI of RCA with single drug-eluting sten  PROCEDURE DESCRIPTION    This was felt to be an emergency procedure.  Patient was prepped draped in the usual sterile fashion.  Local anaesthetic was applied over puncture site.  Using a back wall technique, a 6 [place] Terumo sheath was inserted into right radial artery.  Verapamil, nitroglycerin, nicardipine were administered through the sheath.  Heparin was administered.  Diagnostic 5 Greenlandic pigtail, Ultram catheters were used for diagnostic angiograms.  At the conclusion of the procedure, a TR band was placed over the puncture site and hemostasis was obtained.  There were no immediate complications. I supervised sedation from 12:17 PM to 1:08 PM with versed 6 mg/fentanyl 200 mcg during the procedure. An independent trained observer pushed meds at my direction.  We monitored the patient's level of consciousness and vital signs/physiologic status throughout the procedure duration (see times listed previously).  130 cc contrast was utilized. <20cc EBL. FINDINGS      LVEDP  17   GRADIENT ACROSS AORTIC VALVE  none   LV FUNCTION EF 35%   WALL MOTION  inferior hypokinesis   MITRAL REGURGITATION  mild      LM  Less than 10% proximal stenosis, mid 20-30% stenosis.  There is distal 70% stenosis.       LAD  Proximal 20% stenosis, mid 60 to 70% stenosis, distal less than 10% stenosis.     D1 has 20% uuscsezv-bjh-daqvfi stenosis.          LCX  Less than 10% hjgnfniy-rto-kojqwo stenosis.       RI  Difficult to fully visualize on study, is a small to medium size vessel, there does not appear to be severe obstruction, there does appear to be less than 10% xicltrbq-mbi-anamow stenosis.             RCA Dominant, large vessel, proximal-mid 100% occlusion, distal vessel was ultimately visualized on completion angiography is less than 10% stenosis.  There is thrombus present in the mid vessel.      PERCUTANEOUS INTERVENTION DESCRIPTION    Heparin was used for any coagulation, patient been preloaded with Brilinta, Integrilin was given during the procedure.  Initially a 6 [employer]e JR 5 guiding catheter used to intubate the RCA.  A [employer] wire was used to cross the lesion.  Mechanical thrombectomy was performed with the penumbra device.  Flow was restored to this. Kyle Sabal was performed which showed thrombus/dissected plaque in the mid vessel and this was then treated with stenting with Medtronic resolute Isidro 4 x 38 mm drug-eluting stent.  Stent was postdilated with 4.5 and 5 mm noncompliant balloons.  Follow-up IVUS showed good stent apposition/expansion. Noreene Shouts was no residual dissection or thrombus noted. CONCLUSIONS:    Severe left main disease  Successful PCI of RCA with single drug-eluting stent   Continue Integrilin indefinitely, will consult with CT surgery, case discussed with Dr. Webber in Cath Lab, plan for CABG at some point in the future. Cardiology Labs Reviewed:   CBC: No results for input(s): WBC, HGB, HCT, PLT in the last 72 hours. BMP:No results for input(s): NA, K, CO2, BUN, CREATININE, LABGLOM, GLUCOSE in the last 72 hours. PT/INR: No results for input(s): PROTIME, INR in the last 72 hours. APTT:No results for input(s): APTT in the last 72 hours.   FASTING LIPID PANEL:  Lab Results   Component Value Date    HDL 33 03/29/2022    LDLCALC 121 03/29/2022    TRIG 132 03/29/2022     LIVER PROFILE:No results for input(s): AST, ALT, ALB in the last 72 hours.  BNP: No results found for: PROBNP    Reviewed all labs and imaging today    Assessment:   CAD: angina resolved, s/p CABG x2 and ZUNILDA clip 3/29/2022 and s/p thrombectomy and DANIA RCA in the setting of STEMI 3/24/2022  Ischemic cardiomyopathy: EF 40-45%  HLD: uncontrolled, , statin and recheck  Tobacco abuse: he quit 3/2022, continued cessation encouraged     Plan:   1. Continue cardiac rehab  2. Start losartan 25 mg daily due to cardiomyopathy  3. Recheck BMP as well as lipids/LFTs with PCP labs (does not like needles and want to consolidate lab draws)  4. Continue aspirin, statin, plavix, toprol  5. Repeat echo for EF 7/2022  6. Check BP at home and call the office if consistently out of goal range  7. Stay active along with a healthy diet  8.  Follow up with Dr. Carcamo Doctor, APRN-CNP  [address]  [phone]

## 2022-05-26 ENCOUNTER — OFFICE VISIT (OUTPATIENT)
Dept: CARDIOLOGY CLINIC | Age: 57
End: 2022-05-26
Payer: COMMERCIAL

## 2022-05-26 VITALS
OXYGEN SATURATION: 96 % | DIASTOLIC BLOOD PRESSURE: 68 MMHG | BODY MASS INDEX: 23.84 KG/M2 | HEIGHT: 72 IN | WEIGHT: 176 LBS | HEART RATE: 73 BPM | SYSTOLIC BLOOD PRESSURE: 116 MMHG

## 2022-05-26 DIAGNOSIS — E78.2 MIXED HYPERLIPIDEMIA: ICD-10-CM

## 2022-05-26 DIAGNOSIS — I25.10 CORONARY ARTERY DISEASE INVOLVING NATIVE CORONARY ARTERY OF NATIVE HEART WITHOUT ANGINA PECTORIS: Primary | ICD-10-CM

## 2022-05-26 DIAGNOSIS — I25.5 ISCHEMIC CARDIOMYOPATHY: ICD-10-CM

## 2022-05-26 PROBLEM — E43 SEVERE MALNUTRITION (HCC): Status: RESOLVED | Noted: 2022-04-01 | Resolved: 2022-05-26

## 2022-05-26 PROCEDURE — 99214 OFFICE O/P EST MOD 30 MIN: CPT | Performed by: NURSE PRACTITIONER

## 2022-05-26 RX ORDER — LOSARTAN POTASSIUM 25 MG/1
25 TABLET ORAL DAILY
Qty: 30 TABLET | Refills: 5 | Status: SHIPPED | OUTPATIENT
Start: 2022-05-26

## 2022-05-26 ASSESSMENT — ENCOUNTER SYMPTOMS: SHORTNESS OF BREATH: 0

## 2022-05-26 NOTE — PATIENT INSTRUCTIONS
Everything looks great today, good job!   Start losartan 25 mg nightly, take separate from metoprolol  Continue other medications  Fasting blood work in July, do not eat or drink 8 hours prior, water and black coffee ok  Stay active along with a healthy diet  Heart ultrasound, call [phone] to schedule, try to get this when you see Dr. Sarah Fraire  Follow up with Dr. Sarah Fraire

## 2022-06-08 ENCOUNTER — HOSPITAL ENCOUNTER (OUTPATIENT)
Dept: CARDIAC REHAB | Age: 57
Setting detail: THERAPIES SERIES
Discharge: HOME | End: 2022-06-08
Payer: COMMERCIAL

## 2022-06-08 PROCEDURE — 93798 PHYS/QHP OP CAR RHAB W/ECG: CPT

## 2022-06-10 ENCOUNTER — HOSPITAL ENCOUNTER (OUTPATIENT)
Dept: CARDIAC REHAB | Age: 57
Setting detail: THERAPIES SERIES
Discharge: HOME | End: 2022-06-10
Payer: COMMERCIAL

## 2022-06-10 PROCEDURE — 93798 PHYS/QHP OP CAR RHAB W/ECG: CPT

## 2022-06-13 ENCOUNTER — APPOINTMENT (OUTPATIENT)
Dept: CARDIAC REHAB | Age: 57
End: 2022-06-13
Payer: COMMERCIAL

## 2022-06-15 ENCOUNTER — APPOINTMENT (OUTPATIENT)
Dept: CARDIAC REHAB | Age: 57
End: 2022-06-15
Payer: COMMERCIAL

## 2022-06-17 ENCOUNTER — APPOINTMENT (OUTPATIENT)
Dept: CARDIAC REHAB | Age: 57
End: 2022-06-17
Payer: COMMERCIAL

## 2022-06-17 ENCOUNTER — HOSPITAL ENCOUNTER (OUTPATIENT)
Dept: CARDIAC REHAB | Age: 57
Setting detail: THERAPIES SERIES
Discharge: HOME | End: 2022-06-17
Payer: COMMERCIAL

## 2022-06-17 PROCEDURE — 93798 PHYS/QHP OP CAR RHAB W/ECG: CPT

## 2022-06-20 ENCOUNTER — HOSPITAL ENCOUNTER (OUTPATIENT)
Dept: CARDIAC REHAB | Age: 57
Setting detail: THERAPIES SERIES
Discharge: HOME | End: 2022-06-20
Payer: COMMERCIAL

## 2022-06-20 ENCOUNTER — APPOINTMENT (OUTPATIENT)
Dept: CARDIAC REHAB | Age: 57
End: 2022-06-20
Payer: COMMERCIAL

## 2022-06-20 PROCEDURE — 93798 PHYS/QHP OP CAR RHAB W/ECG: CPT

## 2022-06-22 ENCOUNTER — HOSPITAL ENCOUNTER (OUTPATIENT)
Dept: CARDIAC REHAB | Age: 57
Setting detail: THERAPIES SERIES
Discharge: HOME | End: 2022-06-22
Payer: COMMERCIAL

## 2022-06-22 ENCOUNTER — APPOINTMENT (OUTPATIENT)
Dept: CARDIAC REHAB | Age: 57
End: 2022-06-22
Payer: COMMERCIAL

## 2022-06-22 PROCEDURE — 93798 PHYS/QHP OP CAR RHAB W/ECG: CPT

## 2022-06-24 ENCOUNTER — HOSPITAL ENCOUNTER (OUTPATIENT)
Dept: CARDIAC REHAB | Age: 57
Setting detail: THERAPIES SERIES
Discharge: HOME | End: 2022-06-24
Payer: COMMERCIAL

## 2022-06-24 ENCOUNTER — APPOINTMENT (OUTPATIENT)
Dept: CARDIAC REHAB | Age: 57
End: 2022-06-24
Payer: COMMERCIAL

## 2022-06-24 PROCEDURE — 93798 PHYS/QHP OP CAR RHAB W/ECG: CPT

## 2022-06-27 ENCOUNTER — APPOINTMENT (OUTPATIENT)
Dept: CARDIAC REHAB | Age: 57
End: 2022-06-27
Payer: COMMERCIAL

## 2022-06-27 ENCOUNTER — HOSPITAL ENCOUNTER (OUTPATIENT)
Dept: CARDIAC REHAB | Age: 57
Setting detail: THERAPIES SERIES
Discharge: HOME | End: 2022-06-27
Payer: COMMERCIAL

## 2022-06-29 ENCOUNTER — APPOINTMENT (OUTPATIENT)
Dept: CARDIAC REHAB | Age: 57
End: 2022-06-29
Payer: COMMERCIAL

## 2022-06-29 ENCOUNTER — HOSPITAL ENCOUNTER (OUTPATIENT)
Dept: CARDIAC REHAB | Age: 57
Setting detail: THERAPIES SERIES
Discharge: HOME | End: 2022-06-29
Payer: COMMERCIAL

## 2022-06-29 PROCEDURE — 93798 PHYS/QHP OP CAR RHAB W/ECG: CPT

## 2022-07-01 ENCOUNTER — HOSPITAL ENCOUNTER (OUTPATIENT)
Dept: CARDIAC REHAB | Age: 57
Setting detail: THERAPIES SERIES
Discharge: HOME | End: 2022-07-01
Payer: COMMERCIAL

## 2022-07-01 PROCEDURE — 93798 PHYS/QHP OP CAR RHAB W/ECG: CPT

## 2022-07-08 ENCOUNTER — APPOINTMENT (OUTPATIENT)
Dept: CARDIAC REHAB | Age: 57
End: 2022-07-08
Payer: COMMERCIAL

## 2022-07-10 NOTE — PROGRESS NOTES
Rebeka Treadwell (:  1965) is a 56 y.o. male,New patient, here for evaluation of the following chief complaint(s):  New Patient (establish care ) and Other (discuss helping not wanting to start back up smoking )         ASSESSMENT/PLAN:  1. Encounter to establish care      -  Reviewed history with the patient as well as routine health maintenance, he declined colon cancer screening at this time. Routine lab work ordered through cardiology  2. Prostate cancer screening  -     PSA Screening; Future  3. Ischemic cardiomyopathy       -    Improving, in cardiac rehab. There is some mild depression with regards to how he has physically declined. 4. S/P CABG x 3       -   Continues to have left sided chest wall pain with pressure    No follow-ups on file.          Subjective   SUBJECTIVE/OBJECTIVE:  HPI  The pt is here to establish care  Pt has not seen a specialist in several years    Ischemic Cardiomyopathy:  Current medication: losartan and metoprolol  Side effects: none  Home blood pressure readings: 100-110/50-60  Reports: shortness of breath with exertion, fatigue  Denies: heart palpitations, chest pain  Meeting with cardiology every 2 months, has been cleared by the cardiothoracic surgeon    HLD:  Current medication: lipitor  Side effects: none  Diet: tries to eat healthy  Exercise: in cardiac rehab    Hx of MI:  Bypass on - continues to have left sided chest wall pain with pressure and numbness across the chest  Current medication: aspirin, plavix and lipitor  Currently in cardiac rehab  + family hx of coronary artery disease    GERD:  Current medication: pepcid  Pt is unsure why he is on this medication  It was started in the hospital but he denies any GERD type symptoms    Tobacco use: stopped smoking on   Craving tobacco and having dreams about it now that he is exposed to ads in cardiac rehab with smokers on the advertisement  Worse after dinners  Has not been using any type medication to help him quit    Pt is not interested in a colon cancer screening  Pt has outstanding lab work for cardiology but reports that he doesn't like to draw labs        Review of Systems   Constitutional: Positive for fatigue. Eyes: Negative for visual disturbance. Respiratory: Positive for shortness of breath. Negative for chest tightness and wheezing. Cardiovascular: Negative for chest pain, palpitations and leg swelling. Chest wall pain from surgery   Gastrointestinal: Negative for abdominal distention, abdominal pain, blood in stool, constipation, diarrhea and nausea. Endocrine: Negative for polydipsia, polyphagia and polyuria. Skin: Negative for color change and pallor. Neurological: Negative for dizziness, light-headedness and headaches. Hematological: Negative for adenopathy. Does not bruise/bleed easily. Objective   Physical Exam  Vitals reviewed. Constitutional:       Appearance: He is normal weight. HENT:      Head: Normocephalic and atraumatic. Eyes:      Conjunctiva/sclera: Conjunctivae normal.      Pupils: Pupils are equal, round, and reactive to light. Cardiovascular:      Rate and Rhythm: Normal rate and regular rhythm. Heart sounds: Normal heart sounds. Pulmonary:      Effort: Pulmonary effort is normal.      Breath sounds: Normal breath sounds. Chest:      Chest wall: Tenderness present. Abdominal:      General: Bowel sounds are normal.      Palpations: Abdomen is soft. There is no mass. Tenderness: There is no abdominal tenderness. Musculoskeletal:      Right lower leg: No edema. Left lower leg: No edema. Skin:     Coloration: Skin is not pale. Neurological:      Mental Status: He is alert and oriented to person, place, and time. Cranial Nerves: No cranial nerve deficit. Psychiatric:         Attention and Perception: Attention normal.         Mood and Affect: Mood is anxious and depressed.          Speech: Speech normal.         Behavior: Behavior normal. Behavior is cooperative. Thought Content: Thought content normal.         Cognition and Memory: Cognition and memory normal.         Judgment: Judgment normal.      Comments: Pt struggling with loss of function                  An electronic signature was used to authenticate this note.     --MANAS Jaramillo

## 2022-07-11 ENCOUNTER — HOSPITAL ENCOUNTER (OUTPATIENT)
Dept: CARDIAC REHAB | Age: 57
Setting detail: THERAPIES SERIES
End: 2022-07-11
Payer: COMMERCIAL

## 2022-07-11 ENCOUNTER — OFFICE VISIT (OUTPATIENT)
Dept: FAMILY MEDICINE CLINIC | Age: 57
End: 2022-07-11
Payer: COMMERCIAL

## 2022-07-11 VITALS
DIASTOLIC BLOOD PRESSURE: 60 MMHG | HEART RATE: 54 BPM | SYSTOLIC BLOOD PRESSURE: 100 MMHG | WEIGHT: 177 LBS | HEIGHT: 71 IN | BODY MASS INDEX: 24.78 KG/M2 | OXYGEN SATURATION: 98 %

## 2022-07-11 DIAGNOSIS — I25.5 ISCHEMIC CARDIOMYOPATHY: ICD-10-CM

## 2022-07-11 DIAGNOSIS — Z95.1 S/P CABG X 3: ICD-10-CM

## 2022-07-11 DIAGNOSIS — Z76.89 ENCOUNTER TO ESTABLISH CARE: Primary | ICD-10-CM

## 2022-07-11 DIAGNOSIS — Z12.5 PROSTATE CANCER SCREENING: ICD-10-CM

## 2022-07-11 PROCEDURE — 99203 OFFICE O/P NEW LOW 30 MIN: CPT | Performed by: PHYSICIAN ASSISTANT

## 2022-07-11 SDOH — ECONOMIC STABILITY: HOUSING INSECURITY
IN THE LAST 12 MONTHS, WAS THERE A TIME WHEN YOU DID NOT HAVE A STEADY PLACE TO SLEEP OR SLEPT IN A SHELTER (INCLUDING NOW)?: NO

## 2022-07-11 SDOH — ECONOMIC STABILITY: TRANSPORTATION INSECURITY
IN THE PAST 12 MONTHS, HAS THE LACK OF TRANSPORTATION KEPT YOU FROM MEDICAL APPOINTMENTS OR FROM GETTING MEDICATIONS?: NO

## 2022-07-11 SDOH — ECONOMIC STABILITY: TRANSPORTATION INSECURITY
IN THE PAST 12 MONTHS, HAS LACK OF TRANSPORTATION KEPT YOU FROM MEETINGS, WORK, OR FROM GETTING THINGS NEEDED FOR DAILY LIVING?: NO

## 2022-07-11 SDOH — ECONOMIC STABILITY: HOUSING INSECURITY: IN THE LAST 12 MONTHS, HOW MANY PLACES HAVE YOU LIVED?: 0

## 2022-07-11 SDOH — ECONOMIC STABILITY: FOOD INSECURITY: WITHIN THE PAST 12 MONTHS, YOU WORRIED THAT YOUR FOOD WOULD RUN OUT BEFORE YOU GOT MONEY TO BUY MORE.: NEVER TRUE

## 2022-07-11 SDOH — ECONOMIC STABILITY: INCOME INSECURITY: IN THE LAST 12 MONTHS, WAS THERE A TIME WHEN YOU WERE NOT ABLE TO PAY THE MORTGAGE OR RENT ON TIME?: NO

## 2022-07-11 SDOH — ECONOMIC STABILITY: FOOD INSECURITY: WITHIN THE PAST 12 MONTHS, THE FOOD YOU BOUGHT JUST DIDN'T LAST AND YOU DIDN'T HAVE MONEY TO GET MORE.: NEVER TRUE

## 2022-07-11 ASSESSMENT — COLUMBIA-SUICIDE SEVERITY RATING SCALE - C-SSRS
6. HAVE YOU EVER DONE ANYTHING, STARTED TO DO ANYTHING, OR PREPARED TO DO ANYTHING TO END YOUR LIFE?: NO
1. WITHIN THE PAST MONTH, HAVE YOU WISHED YOU WERE DEAD OR WISHED YOU COULD GO TO SLEEP AND NOT WAKE UP?: NO
2. HAVE YOU ACTUALLY HAD ANY THOUGHTS OF KILLING YOURSELF?: NO

## 2022-07-11 ASSESSMENT — PATIENT HEALTH QUESTIONNAIRE - PHQ9
SUM OF ALL RESPONSES TO PHQ9 QUESTIONS 1 & 2: 1
9. THOUGHTS THAT YOU WOULD BE BETTER OFF DEAD, OR OF HURTING YOURSELF: 1
5. POOR APPETITE OR OVEREATING: 0
1. LITTLE INTEREST OR PLEASURE IN DOING THINGS: 0
SUM OF ALL RESPONSES TO PHQ QUESTIONS 1-9: 3
2. FEELING DOWN, DEPRESSED OR HOPELESS: 1
SUM OF ALL RESPONSES TO PHQ QUESTIONS 1-9: 2
6. FEELING BAD ABOUT YOURSELF - OR THAT YOU ARE A FAILURE OR HAVE LET YOURSELF OR YOUR FAMILY DOWN: 1
7. TROUBLE CONCENTRATING ON THINGS, SUCH AS READING THE NEWSPAPER OR WATCHING TELEVISION: 0
8. MOVING OR SPEAKING SO SLOWLY THAT OTHER PEOPLE COULD HAVE NOTICED. OR THE OPPOSITE, BEING SO FIGETY OR RESTLESS THAT YOU HAVE BEEN MOVING AROUND A LOT MORE THAN USUAL: 0
4. FEELING TIRED OR HAVING LITTLE ENERGY: 0
3. TROUBLE FALLING OR STAYING ASLEEP: 0
DEPRESSION UNABLE TO ASSESS: FUNCTIONAL CAPACITY MOTIVATION LIMITS ACCURACY
10. IF YOU CHECKED OFF ANY PROBLEMS, HOW DIFFICULT HAVE THESE PROBLEMS MADE IT FOR YOU TO DO YOUR WORK, TAKE CARE OF THINGS AT HOME, OR GET ALONG WITH OTHER PEOPLE: 0
SUM OF ALL RESPONSES TO PHQ QUESTIONS 1-9: 3
SUM OF ALL RESPONSES TO PHQ QUESTIONS 1-9: 3

## 2022-07-11 ASSESSMENT — ANXIETY QUESTIONNAIRES
6. BECOMING EASILY ANNOYED OR IRRITABLE: 0
7. FEELING AFRAID AS IF SOMETHING AWFUL MIGHT HAPPEN: 1
4. TROUBLE RELAXING: 0
3. WORRYING TOO MUCH ABOUT DIFFERENT THINGS: 1
1. FEELING NERVOUS, ANXIOUS, OR ON EDGE: 1
GAD7 TOTAL SCORE: 4
2. NOT BEING ABLE TO STOP OR CONTROL WORRYING: 1
5. BEING SO RESTLESS THAT IT IS HARD TO SIT STILL: 0
IF YOU CHECKED OFF ANY PROBLEMS ON THIS QUESTIONNAIRE, HOW DIFFICULT HAVE THESE PROBLEMS MADE IT FOR YOU TO DO YOUR WORK, TAKE CARE OF THINGS AT HOME, OR GET ALONG WITH OTHER PEOPLE: NOT DIFFICULT AT ALL

## 2022-07-11 ASSESSMENT — SOCIAL DETERMINANTS OF HEALTH (SDOH): HOW HARD IS IT FOR YOU TO PAY FOR THE VERY BASICS LIKE FOOD, HOUSING, MEDICAL CARE, AND HEATING?: NOT HARD AT ALL

## 2022-07-12 ASSESSMENT — ENCOUNTER SYMPTOMS
BLOOD IN STOOL: 0
ABDOMINAL DISTENTION: 0
DIARRHEA: 0
NAUSEA: 0
COLOR CHANGE: 0
CONSTIPATION: 0
CHEST TIGHTNESS: 0
WHEEZING: 0
SHORTNESS OF BREATH: 1
ABDOMINAL PAIN: 0

## 2022-07-13 ENCOUNTER — APPOINTMENT (OUTPATIENT)
Dept: CARDIAC REHAB | Age: 57
End: 2022-07-13
Payer: COMMERCIAL

## 2022-07-15 ENCOUNTER — APPOINTMENT (OUTPATIENT)
Dept: CARDIAC REHAB | Age: 57
End: 2022-07-15
Payer: COMMERCIAL

## 2022-07-18 ENCOUNTER — HOSPITAL ENCOUNTER (OUTPATIENT)
Dept: CARDIAC REHAB | Age: 57
Setting detail: THERAPIES SERIES
Discharge: HOME | End: 2022-07-18
Payer: COMMERCIAL

## 2022-07-18 PROCEDURE — 93798 PHYS/QHP OP CAR RHAB W/ECG: CPT

## 2022-07-20 ENCOUNTER — APPOINTMENT (OUTPATIENT)
Dept: CARDIAC REHAB | Age: 57
End: 2022-07-20
Payer: COMMERCIAL

## 2022-07-22 ENCOUNTER — HOSPITAL ENCOUNTER (OUTPATIENT)
Dept: CARDIAC REHAB | Age: 57
Setting detail: THERAPIES SERIES
Discharge: HOME | End: 2022-07-22
Payer: COMMERCIAL

## 2022-07-22 NOTE — PROGRESS NOTES
Patients wife called and reports patient will not be returning to cardiac rehab. 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 Patient discharged from cardiac rehab.

## 2022-07-25 ENCOUNTER — APPOINTMENT (OUTPATIENT)
Dept: CARDIAC REHAB | Age: 57
End: 2022-07-25
Payer: COMMERCIAL

## 2022-07-27 ENCOUNTER — APPOINTMENT (OUTPATIENT)
Dept: CARDIAC REHAB | Age: 57
End: 2022-07-27
Payer: COMMERCIAL

## 2022-07-29 ENCOUNTER — HOSPITAL ENCOUNTER (OUTPATIENT)
Dept: NON INVASIVE DIAGNOSTICS | Age: 57
Discharge: HOME | End: 2022-07-29
Payer: COMMERCIAL

## 2022-07-29 ENCOUNTER — APPOINTMENT (OUTPATIENT)
Dept: CARDIAC REHAB | Age: 57
End: 2022-07-29
Payer: COMMERCIAL

## 2022-07-29 DIAGNOSIS — I25.5 ISCHEMIC CARDIOMYOPATHY: ICD-10-CM

## 2022-07-29 PROCEDURE — 93308 TTE F-UP OR LMTD: CPT

## 2022-08-04 ENCOUNTER — TELEPHONE (OUTPATIENT)
Dept: CARDIOLOGY CLINIC | Age: 57
End: 2022-08-04

## 2022-08-04 DIAGNOSIS — Z12.5 PROSTATE CANCER SCREENING: ICD-10-CM

## 2022-08-04 DIAGNOSIS — I25.83 CORONARY ARTERY DISEASE DUE TO LIPID RICH PLAQUE: ICD-10-CM

## 2022-08-04 DIAGNOSIS — I25.10 CORONARY ARTERY DISEASE DUE TO LIPID RICH PLAQUE: ICD-10-CM

## 2022-08-04 DIAGNOSIS — I25.5 ISCHEMIC CARDIOMYOPATHY: ICD-10-CM

## 2022-08-04 LAB
ALBUMIN SERPL-MCNC: 4.7 G/DL (ref 3.4–5)
ALP BLD-CCNC: 179 U/L (ref 40–129)
ALT SERPL-CCNC: 41 U/L (ref 10–40)
ANION GAP SERPL CALCULATED.3IONS-SCNC: 15 MMOL/L (ref 3–16)
AST SERPL-CCNC: 32 U/L (ref 15–37)
BILIRUB SERPL-MCNC: 0.8 MG/DL (ref 0–1)
BILIRUBIN DIRECT: <0.2 MG/DL (ref 0–0.3)
BILIRUBIN, INDIRECT: ABNORMAL MG/DL (ref 0–1)
BUN BLDV-MCNC: 19 MG/DL (ref 7–20)
CALCIUM SERPL-MCNC: 9.2 MG/DL (ref 8.3–10.6)
CHLORIDE BLD-SCNC: 103 MMOL/L (ref 99–110)
CHOLESTEROL, TOTAL: 157 MG/DL (ref 0–199)
CO2: 23 MMOL/L (ref 21–32)
CREAT SERPL-MCNC: 0.8 MG/DL (ref 0.9–1.3)
GFR AFRICAN AMERICAN: >60
GFR NON-AFRICAN AMERICAN: >60
GLUCOSE BLD-MCNC: 107 MG/DL (ref 70–99)
HDLC SERPL-MCNC: 41 MG/DL (ref 40–60)
LDL CHOLESTEROL CALCULATED: 91 MG/DL
POTASSIUM SERPL-SCNC: 5.3 MMOL/L (ref 3.5–5.1)
PROSTATE SPECIFIC ANTIGEN: 0.71 NG/ML (ref 0–4)
SODIUM BLD-SCNC: 141 MMOL/L (ref 136–145)
TOTAL PROTEIN: 7.1 G/DL (ref 6.4–8.2)
TRIGL SERPL-MCNC: 126 MG/DL (ref 0–150)
VLDLC SERPL CALC-MCNC: 25 MG/DL

## 2022-08-04 NOTE — TELEPHONE ENCOUNTER
Pts wife called mhi and stated pt went to get blood drawn this morning for upcoming appt but pt has severe white coat syndrome and wasn't able to complete, pt wasn't able to get cbc completed. Pts wife wondering if SRJ can prescribe a one time anxiety pill to get labs completed. Please advise.

## 2022-08-05 ENCOUNTER — TELEPHONE (OUTPATIENT)
Dept: CARDIOLOGY CLINIC | Age: 57
End: 2022-08-05

## 2022-08-05 NOTE — TELEPHONE ENCOUNTER
Called and spoke with pt's wife Tk Roche, she is on pt HIPAA form. Relayed lab results to her. She verbalized understanding of results given and will discuss them with  pt.

## 2022-08-05 NOTE — TELEPHONE ENCOUNTER
----- Message from JESSICA Matthews - CNP sent at 8/5/2022  8:01 AM EDT -----  (I am covering for Verna Lisa CNP who is away from the office today.)   -Potassium is on high side. Renal function stable.   -Cholesterol panel better but LDL not quite to goal.   -Liver function tests abnormal.  Follow up with PCP. Continue current treatment plan and follow up with Dr. Estella Jackson next week as planned.    Thank Deandre Carrasquillo

## 2022-08-10 NOTE — PROGRESS NOTES
[address] [facility]  [phone]      Attending Physician: MANAS Alexander    Reason for Consultation/Chief Complaint: 2 month follow up, CAD, Acute MI, Ischemic cardiomyopathy    Subjective   History of Present Illness:  Nicky Eaton is a 56 y.o.  male with a history of CAD, ischemic cardiomyopathy, HLD. He presented 3/24/2022 for chest pain and found to have inferior STEMI. LHC showed 100% RCA treated with DANIA. Noted to have LM disease, CT surgery consulted. Echo showed EF 40-45%. On 3/29/2022 he had CABG x2 and ZUNILDA clip. OV 5/26/22 NP Bula Plate he reported feeling better and was recovering well form CABG. He reported that he had started cardiac rehab and was doing well. SOB and chest tightness had improved though he still had sternal tightness. He reported that he had stopped smoking after MI and CABG. Today he reports that he has been doing good overall. He states that he feels like he is getting better. 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 He states that he stopped smoking. Reports taking all medications and tolerating them. Denies chest pain, sob, dizziness, syncope and edema. Past Medical History:   has a past medical history of Hyperlipidemia and Hypertension. Surgical History:   has a past surgical history that includes Coronary artery bypass graft (N/A, 3/29/2022). Social History:   reports that he quit smoking about 4 months ago. His smoking use included cigarettes. He has never used smokeless tobacco.     Family History:  family history includes Anemia in his mother; Diabetes in his mother; Heart Disease in his father and mother; High Blood Pressure in his mother; High Cholesterol in his mother. Home Medications:  Were reviewed and are listed in nursing record and/or below  Prior to Admission medications    Medication Sig Start Date End Date Taking?  Authorizing Provider   losartan (COZAAR) 25 MG tablet Take 1 tablet by mouth daily 5/26/22  Yes Reyes Salts, APRN - CNP   metoprolol succinate (TOPROL XL) 25 MG extended release tablet Take 1 tablet by mouth daily 4/25/22  Yes Reyes Salts, APRN - CNP   famotidine (PEPCID) 20 MG tablet Take 1 tablet by mouth daily 4/25/22 8/11/22 Yes Reyes Salts, APRN - CNP   clopidogrel (PLAVIX) 75 MG tablet Take 1 tablet by mouth daily 4/25/22 8/11/22 Yes Reyes Salts, APRN - CNP   atorvastatin (LIPITOR) 80 MG tablet Take 1 tablet by mouth nightly 4/25/22 8/11/22 Yes Reyes Salts, APRN - CNP   aspirin 81 MG EC tablet Take 1 tablet by mouth daily 4/25/22 8/11/22 Yes Reyes Salts, APRN - CNP   acetaminophen (TYLENOL) 500 MG tablet Take 1,000 mg by mouth every 6 hours as needed for Pain   Yes Historical Provider, MD        CURRENT Medications:  No current facility-administered medications for this visit. Allergies:  Patient has no known allergies. Review of Systems:   A 14 point review of symptoms completed. Pertinent positives identified in the HPI, all other review of symptoms negative as below.       Objective   PHYSICAL EXAM:    Vitals:    08/11/22 1357   BP: 110/64   Pulse:    SpO2:               General Appearance:  Alert, cooperative, no distress, appears stated age   Head:  Normocephalic, without obvious abnormality, atraumatic   Eyes:  PERRL, conjunctiva/corneas clear   Nose: Nares normal, no drainage or sinus tenderness   Throat: Lips, mucosa, and tongue normal   Neck: Supple, symmetrical, trachea midline, no adenopathy, thyroid: not enlarged, symmetric, no tenderness/mass/nodules, no carotid bruit or JVD   Lungs:   Clear to auscultation bilaterally, respirations unlabored   Chest Wall:  Healed sternotomy wound    Heart:  Regular rate and rhythm, S1, S2 normal, no murmur, rub or gallop   Abdomen:   Soft, non-tender, bowel sounds active all four quadrants,  no masses, no organomegaly   Extremities: Extremities normal, atraumatic, no cyanosis or edema   Pulses: 2+ and symmetric   Skin: Skin color, texture, turgor normal, no rashes or lesions   Pysch: Normal mood and affect   Neurologic: Normal gross motor and sensory exam.         Labs   CBC:   Lab Results   Component Value Date/Time    WBC 11.4 04/01/2022 05:00 AM    RBC 3.09 04/01/2022 05:00 AM    HGB 9.7 04/01/2022 05:00 AM    HCT 28.9 04/01/2022 05:00 AM    MCV 93.4 04/01/2022 05:00 AM    RDW 12.9 04/01/2022 05:00 AM     04/01/2022 05:00 AM     CMP:  Lab Results   Component Value Date/Time     08/04/2022 08:28 AM    K 5.3 08/04/2022 08:28 AM    K 3.9 03/29/2022 04:09 AM     08/04/2022 08:28 AM    CO2 23 08/04/2022 08:28 AM    BUN 19 08/04/2022 08:28 AM    CREATININE 0.8 08/04/2022 08:28 AM    GFRAA >60 08/04/2022 08:28 AM    AGRATIO 1.5 03/24/2022 12:40 PM    LABGLOM >60 08/04/2022 08:28 AM    GLUCOSE 107 08/04/2022 08:28 AM    PROT 7.1 08/04/2022 08:28 AM    CALCIUM 9.2 08/04/2022 08:28 AM    BILITOT 0.8 08/04/2022 08:28 AM    ALKPHOS 179 08/04/2022 08:28 AM    AST 32 08/04/2022 08:28 AM    ALT 41 08/04/2022 08:28 AM     PT/INR:  No results found for: PTINR  HgBA1c:  Lab Results   Component Value Date    LABA1C 5.2 03/29/2022     Lab Results   Component Value Date    TROPONINI 4.33 ([facility]) 03/25/2022         Cardiac Data     Last EKG:     Echo: 3/24/22  Summary   Normal left ventricle size, wall thickness, and systolic function with an   estimated ejection fraction of 40-45%. Mild hypokinesis of the inferior, inferoseptal wall segments. Normal left ventricular diastolic filling pressures. The right ventricle is normal in size and function. Trace mitral and tricuspid valve regurgitation. Systolic pulmonary artery pressure (sPAP) is normal and estimated at 23 mmHg   (right atrial pressure 3 mmHg)   Definity® used for myocardial border enhancement.     Stress Test:    Cath: 3/24/22  FINDINGS      LVGRAM     LVEDP  17   GRADIENT ACROSS AORTIC VALVE  none   LV FUNCTION EF 35%   WALL MOTION  inferior hypokinesis   MITRAL REGURGITATION mild         CORONARY ARTERIES     LM  Less than 10% proximal stenosis, mid 20-30% stenosis. There is distal 70% stenosis. LAD  Proximal 20% stenosis, mid 60 to 70% stenosis, distal less than 10% stenosis. D1 has 20% okuxlmwg-vsi-jbkqqs stenosis. LCX  Less than 10% zpgjcptr-ltb-debxou stenosis. RI  Difficult to fully visualize on study, is a small to medium size vessel, there does not appear to be severe obstruction, there does appear to be less than 10% omisjxvr-hic-uqqzzj stenosis. RCA Dominant, large vessel, proximal-mid 100% occlusion, distal vessel was ultimately visualized on completion angiography is less than 10% stenosis. There is thrombus present in the mid vessel. CONCLUSIONS:      Severe left main disease  Successful PCI of RCA with single drug-eluting stent       Studies:       I have reviewed labs and imaging/xray/diagnostic testing in this note. Assessment and Plan     Patient Active Problem List   Diagnosis    Acute myocardial infarction [facility])    CAD in native artery    S/P CABG x 3    Ischemic cardiomyopathy       PLAN:  Continue taking all the same medications for chronic heart conditions above   Eat foods low in potassium for now  Lab work in about a week. BMP LFT  Follow up in NP 3 months        Scribe's attestation: This note was scribed in the presence of Dr. Poornima Gaspar MD   by Ryann Maya LPN    I, Dr Poornima Gaspar, personally performed the services described in this documentation, as scribed by the above signed scribe in my presence. It is both accurate and complete to my knowledge. I agree with the details independently gathered by the clinical support staff and the scribed note accurately describes my personal service to the patient. Thank you for allowing us to participate in the care of [facility]. Please call me with any questions [phone].     Poornima Gaspar MD, [address] [facility]  [phone] [facility]  [phone] [address] [place] [address]  8/11/2022 2:02 PM

## 2022-08-11 ENCOUNTER — OFFICE VISIT (OUTPATIENT)
Dept: CARDIOLOGY CLINIC | Age: 57
End: 2022-08-11
Payer: COMMERCIAL

## 2022-08-11 VITALS — OXYGEN SATURATION: 98 % | SYSTOLIC BLOOD PRESSURE: 110 MMHG | HEART RATE: 58 BPM | DIASTOLIC BLOOD PRESSURE: 64 MMHG

## 2022-08-11 DIAGNOSIS — Z95.1 S/P CABG X 3: ICD-10-CM

## 2022-08-11 DIAGNOSIS — Z79.899 MEDICATION MANAGEMENT: Primary | ICD-10-CM

## 2022-08-11 PROCEDURE — 99214 OFFICE O/P EST MOD 30 MIN: CPT | Performed by: INTERNAL MEDICINE

## 2022-08-11 NOTE — PATIENT INSTRUCTIONS
PLAN:  Continue taking all the same medications for chronic heart conditions above   Eat foods low in potassium for now  Lab work in about a week.  BNP LFT  Follow up in NP 3 months

## 2022-10-03 ENCOUNTER — APPOINTMENT (OUTPATIENT)
Dept: CARDIAC REHAB | Age: 57
End: 2022-10-03
Payer: COMMERCIAL

## 2022-10-05 ENCOUNTER — APPOINTMENT (OUTPATIENT)
Dept: CARDIAC REHAB | Age: 57
End: 2022-10-05
Payer: COMMERCIAL

## 2022-10-07 ENCOUNTER — APPOINTMENT (OUTPATIENT)
Dept: CARDIAC REHAB | Age: 57
End: 2022-10-07
Payer: COMMERCIAL

## 2022-10-10 ENCOUNTER — APPOINTMENT (OUTPATIENT)
Dept: CARDIAC REHAB | Age: 57
End: 2022-10-10
Payer: COMMERCIAL

## 2022-11-17 ENCOUNTER — OFFICE VISIT (OUTPATIENT)
Dept: CARDIOLOGY CLINIC | Age: 57
End: 2022-11-17
Payer: COMMERCIAL

## 2022-11-17 VITALS
SYSTOLIC BLOOD PRESSURE: 118 MMHG | OXYGEN SATURATION: 97 % | TEMPERATURE: 98.6 F | BODY MASS INDEX: 26.19 KG/M2 | HEIGHT: 72 IN | WEIGHT: 193.4 LBS | HEART RATE: 64 BPM | DIASTOLIC BLOOD PRESSURE: 60 MMHG

## 2022-11-17 DIAGNOSIS — I25.10 CORONARY ARTERY DISEASE INVOLVING NATIVE CORONARY ARTERY OF NATIVE HEART WITHOUT ANGINA PECTORIS: Primary | ICD-10-CM

## 2022-11-17 DIAGNOSIS — E78.2 MIXED HYPERLIPIDEMIA: ICD-10-CM

## 2022-11-17 PROCEDURE — 99215 OFFICE O/P EST HI 40 MIN: CPT | Performed by: NURSE PRACTITIONER

## 2022-11-17 RX ORDER — DIAZEPAM 5 MG/1
5 TABLET ORAL ONCE
Qty: 2 TABLET | Refills: 0 | Status: SHIPPED | OUTPATIENT
Start: 2022-11-17 | End: 2022-11-17

## 2022-11-17 ASSESSMENT — ENCOUNTER SYMPTOMS
SHORTNESS OF BREATH: 0
GASTROINTESTINAL NEGATIVE: 1

## 2022-11-17 NOTE — PATIENT INSTRUCTIONS
Obtain fasting labs, do not eat or drink 8 hours prior, water and black coffee ok  Recommend going to a gym and trying a   Continue current medications  Stay active along with a healthy diet  Follow up in 6 months

## 2022-11-17 NOTE — PROGRESS NOTES
[facility]   Cardiology Note              Date:  November 17, 2022  Patientname: Jaime Roman  YOB: 1965    Primary Care physician: MANAS Banks    HISTORY OF PRESENT ILLNESS: Jaime Roman is a 56 y.o. male with a history of CAD, ischemic cardiomyopathy, HLD. He presented 3/24/2022 for chest pain and found to have inferior STEMI. LHC showed 100% RCA treated with DANIA. Noted to have LM disease, CT surgery consulted. Echo showed EF 40-45%. On 3/29/2022 he had CABG x2 and ZUNILDA clip. Echo 7/2022 showed EF 55%. Today he presents for follow up for CAD. Overall he is doing well. With very strenuous exertion such as pushing a full wheelbarrow up a hill, he will notice shortness of breath and chest pressure, resolves with rest. With regular activity and moderate activity he has no symptoms. He has no significant dizziness, lightheadedness, palpitations, edema. 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 He has gained weight since he stopped smoking. He feels anxious regarding any medical procedures, blood draws, office visits. At last blood draw, he had near syncope. Cardiologist: Dr. Jordan Smyth 8/2022    Past Medical History:   has a past medical history of Hyperlipidemia and Hypertension. Past Surgical History:   has a past surgical history that includes Coronary artery bypass graft (N/A, 3/29/2022). Home Medications:    Prior to Admission medications    Medication Sig Start Date End Date Taking?  Authorizing Provider   losartan (COZAAR) 25 MG tablet Take 1 tablet by mouth daily 5/26/22   JESSICA Garcia CNP   metoprolol succinate (TOPROL XL) 25 MG extended release tablet Take 1 tablet by mouth daily 4/25/22   JESSICA Garcia CNP   famotidine (PEPCID) 20 MG tablet Take 1 tablet by mouth daily 4/25/22 8/11/22  JESSICA Garcia CNP   clopidogrel (PLAVIX) 75 MG tablet Take 1 tablet by mouth daily 4/25/22 8/11/22  JESSICA Garcia CNP   atorvastatin (LIPITOR) 80 MG tablet Take 1 tablet by mouth nightly 4/25/22 8/11/22  Gunn Bullion, APRN - CNP   aspirin 81 MG EC tablet Take 1 tablet by mouth daily 4/25/22 8/11/22  Gunn Bullion, APRN - CNP   acetaminophen (TYLENOL) 500 MG tablet Take 1,000 mg by mouth every 6 hours as needed for Pain    Historical Provider, MD     Allergies:  Patient has no known allergies. Social History:   reports that he quit smoking about 7 months ago. His smoking use included cigarettes. He has never used smokeless tobacco.     Family History: family history includes Anemia in his mother; Diabetes in his mother; Heart Disease in his father and mother; High Blood Pressure in his mother; High Cholesterol in his mother. Review of Systems   Review of Systems   Constitutional:  Negative for fatigue. Respiratory:  Negative for shortness of breath. Cardiovascular:  Negative for chest pain, palpitations and leg swelling. Gastrointestinal: Negative. Neurological: Negative. OBJECTIVE:    Vital signs:    /60   Pulse 64   Temp 98.6 °F (37 °C)   Ht 6' (1.829 m)   Wt 193 lb 6.4 oz (87.7 kg)   SpO2 97%   BMI 26.23 kg/m²      Physical Exam:  Constitutional:  Comfortable and alert, NAD, appears stated age  Eyes: PERRL, sclera nonicteric  Neck:  Supple, no masses, no thyroidmegaly, no JVD  Skin:  Warm and dry; no rash or lesions; +sternal incision, healed well  Heart: Regular, normal apex, S1 and S2 normal, no M/G/R  Lungs:  Normal respiratory effort; clear; no wheezing/rhonchi/rales  Abdomen: soft, non tender, + bowel sounds  Extremities:  No edema or cyanosis; no clubbing  Neuro: alert and oriented, moves legs and arms equally, normal mood and affect    Data Reviewed:      Echo 7/2022:   Limited exam for LVEF. LV systolic function is normal with EF estimated at 55%. No obvious segmental wall motion abnormalities. Moderate mitral regurgitation. Mild tricuspid regurgitation.    Systolic pulmonary artery pressure (SPAP) is estimated at 32 mmHg (RAP of 3   mmHg). CABG 3/29/2022:   3/29 CORONARY ARTERY BYPASS GRAFT X2, SAPHENOUS VEIN GRAFT, INTERNAL MAMMARY ARTERY, URSZULA, TEMPORARY PACING WIRE PLACEMENT, ON PUMP, LEFT ATRIAL APPENDAGE CLIP PLACEMENT, BILATERAL PECTORALIS BLOCKS AND PLATELET GEL APPLICATION    Echo 8/10/5735:  Normal left ventricle size, wall thickness, and systolic function with an   estimated ejection fraction of 40-45%. Mild hypokinesis of the inferior, inferoseptal wall segments. Normal left ventricular diastolic filling pressures. The right ventricle is normal in size and function. Trace mitral and tricuspid valve regurgitation. Systolic pulmonary artery pressure (sPAP) is normal and estimated at 23 mmHg   (right atrial pressure 3 mmHg)   Definity® used for myocardial border enhancement. Coronary angiogram 3/24/2022:  Acute inferior STEMI  PROCEDURES PERFORMED    Left heart catheterization  LVgram  Coronary angiogam  Coronary cath  Monitoring of moderate conscious sedation   Mechanical thrombectomy of RCA  IVUS of RCA  PCI of RCA with single drug-eluting sten  PROCEDURE DESCRIPTION    This was felt to be an emergency procedure. Patient was prepped draped in the usual sterile fashion. Local anaesthetic was applied over puncture site. Using a back wall technique, a 6 Lithuanian Terumo sheath was inserted into right radial artery. Verapamil, nitroglycerin, nicardipine were administered through the sheath. Heparin was administered. Diagnostic 5 Belarusian pigtail, Ultram catheters were used for diagnostic angiograms. At the conclusion of the procedure, a TR band was placed over the puncture site and hemostasis was obtained. There were no immediate complications. I supervised sedation from 12:17 PM to 1:08 PM with versed 6 mg/fentanyl 200 mcg during the procedure. An independent trained observer pushed Basys at my direction.   We monitored the patient's level of consciousness and vital signs/physiologic status throughout the procedure duration (see times listed previously). 130 cc contrast was utilized. <20cc EBL. FINDINGS      LVEDP  17   GRADIENT ACROSS AORTIC VALVE  none   LV FUNCTION EF 35%   WALL MOTION  inferior hypokinesis   MITRAL REGURGITATION  mild      LM  Less than 10% proximal stenosis, mid 20-30% stenosis. There is distal 70% stenosis. LAD  Proximal 20% stenosis, mid 60 to 70% stenosis, distal less than 10% stenosis. D1 has 20% xyfcojml-edr-ciburk stenosis. LCX  Less than 10% bqhmiklc-pxo-pfcxmn stenosis. RI  Difficult to fully visualize on study, is a small to medium size vessel, there does not appear to be severe obstruction, there does appear to be less than 10% rvebxwsj-ooa-roghox stenosis. RCA Dominant, large vessel, proximal-mid 100% occlusion, distal vessel was ultimately visualized on completion angiography is less than 10% stenosis. There is thrombus present in the mid vessel. PERCUTANEOUS INTERVENTION DESCRIPTION    Heparin was used for any coagulation, patient been preloaded with Brilinta, Integrilin was given during the procedure. Initially a 6 Western Amee JR 5 guiding catheter used to intubate the RCA. A Kingmaker wire was used to cross the lesion. Mechanical thrombectomy was performed with the penumbra device. Flow was restored to this. IVUS was performed which showed thrombus/dissected plaque in the mid vessel and this was then treated with stenting with Medtronic resolute [place] 4 x 38 mm drug-eluting stent. Stent was postdilated with 4.5 and 5 mm noncompliant balloons. Follow-up IVUS showed good stent apposition/expansion. There was no residual dissection or thrombus noted.   CONCLUSIONS:    Severe left main disease  Successful PCI of RCA with single drug-eluting stent   Continue Integrilin indefinitely, will consult with CT surgery, case discussed with Dr. Pablito Diallo in Cath Lab, plan for CABG at some point in the

## 2022-11-28 RX ORDER — LOSARTAN POTASSIUM 25 MG/1
TABLET ORAL
Qty: 90 TABLET | Refills: 1 | Status: SHIPPED | OUTPATIENT
Start: 2022-11-28

## 2022-12-23 ENCOUNTER — TELEMEDICINE (OUTPATIENT)
Dept: PRIMARY CARE CLINIC | Age: 57
End: 2022-12-23
Payer: COMMERCIAL

## 2022-12-23 ENCOUNTER — TELEPHONE (OUTPATIENT)
Dept: FAMILY MEDICINE CLINIC | Age: 57
End: 2022-12-23

## 2022-12-23 DIAGNOSIS — U07.1 COVID-19: Primary | ICD-10-CM

## 2022-12-23 DIAGNOSIS — R50.9 FEVER, UNSPECIFIED FEVER CAUSE: ICD-10-CM

## 2022-12-23 DIAGNOSIS — R52 BODY ACHES: ICD-10-CM

## 2022-12-23 PROCEDURE — 99213 OFFICE O/P EST LOW 20 MIN: CPT | Performed by: NURSE PRACTITIONER

## 2022-12-23 ASSESSMENT — ENCOUNTER SYMPTOMS: RHINORRHEA: 1

## 2022-12-23 NOTE — PROGRESS NOTES
Dayna Rodríguez (:  1965) is a Established patient, here for evaluation of the following:    ASSESSMENT/PLAN:  Below is the assessment and plan developed based on review of pertinent history, physical exam, labs, studies, and medications. 1. COVID-19  2. Body aches  3. Fever, unspecified fever cause  No follow-ups on file. SUBJECTIVE/OBJECTIVE:  Midnight last night, cold chills, temp was 101-103. It is 99.9 now. Body aches are returning. Tylenol controls fever, but returns when wears off. Neck is sore. His head hurts, he has been in bed since last night. He is congested and has stuffy nose, denies cough. Patient taking home COVID test and I am calling for results. He is positive for covid. Review of Systems   Constitutional:  Positive for activity change, appetite change, chills and fever. HENT:  Positive for congestion and rhinorrhea. Musculoskeletal:  Positive for myalgias. Neurological:  Positive for headaches.      Patient-Reported Vitals 2022   Patient-Reported Weight 186   Patient-Reported Height 6   Patient-Reported Systolic 95   Patient-Reported Diastolic 52   Patient-Reported Pulse 73   Patient-Reported Temperature 99.2 at 3:25pm   Patient-Reported SpO2 91         Physical Exam    [INSTRUCTIONS:  \"[x]\" Indicates a positive item  \"[]\" Indicates a negative item  -- DELETE ALL ITEMS NOT EXAMINED]    Constitutional: [x] Appears well-developed and well-nourished [x] No apparent distress      [] Abnormal -     Mental status: [x] Alert and awake  [x] Oriented to person/place/time [x] Able to follow commands    [] Abnormal -     Eyes:   EOM    [x]  Normal    [] Abnormal -   Sclera  [x]  Normal    [] Abnormal -          Discharge [x]  None visible   [] Abnormal -     HENT: [x] Normocephalic, atraumatic  [] Abnormal -   [x] Mouth/Throat: Mucous membranes are moist    External Ears [x] Normal  [] Abnormal -    Neck: [x] No visualized mass [] Abnormal -     Pulmonary/Chest: [x] Respiratory effort normal   [x] No visualized signs of difficulty breathing or respiratory distress        [] Abnormal -      Musculoskeletal:   [x] Normal gait with no signs of ataxia         [x] Normal range of motion of neck        [] Abnormal -     Neurological:        [x] No Facial Asymmetry (Cranial nerve 7 motor function) (limited exam due to video visit)          [x] No gaze palsy        [] Abnormal -          Skin:        [x] No significant exanthematous lesions or discoloration noted on facial skin         [] Abnormal -            Psychiatric:       [x] Normal Affect [] Abnormal -        [x] No Hallucinations    Other pertinent observable physical exam findings:-      On this date 12/23/2022 I have spent 20 minutes reviewing previous notes, test results and face to face (virtual) with the patient discussing the diagnosis and importance of compliance with the treatment plan as well as documenting on the day of the visit. Kody Guerrero, was evaluated through a synchronous (real-time) audio-video encounter. The patient (or guardian if applicable) is aware that this is a billable service, which includes applicable co-pays. This Virtual Visit was conducted with patient's (and/or legal guardian's) consent. The visit was conducted pursuant to the emergency declaration under the [facility], [address] [place] [address] authority and the [employer] General Act. Patient identification was verified, and a caregiver was present when appropriate. The patient was located at home in a state where the provider was licensed to provide care.     --JESSICA Jamison

## 2022-12-23 NOTE — TELEPHONE ENCOUNTER
I received a page from [facility] on this patient. He is experiencing a fever a fever and chills. I would recommend tylenol or ibuprofen for treatment. We are seeing a lot of covid and flu right now so he may want to be seen at an Urgent Care for treatment as our office is closed for testing due to the weather.

## 2023-03-30 ENCOUNTER — TELEPHONE (OUTPATIENT)
Dept: CARDIOLOGY CLINIC | Age: 58
End: 2023-03-30

## 2023-03-30 NOTE — TELEPHONE ENCOUNTER
CHARLEY OOT. Pt seen SRJ 8/11/22 and NPMAKAYLA   11/17/22. Padmini Yeager is an OTC either in cream or tablet form. Please advise if pt can take from cardiac standpoint. Thank you    Patient Active Problem List   Diagnosis    Acute myocardial infarction [facility])    CAD in native artery    S/P CABG x 3    Ischemic cardiomyopathy       PLAN:  Continue taking all the same medications for chronic heart conditions above   Eat foods low in potassium for now  Lab work in about a week.  BMP LFT  Follow up in NP 3 months

## 2023-03-30 NOTE — TELEPHONE ENCOUNTER
3/30 pt called with a question for NPLR. Pt would like to know if he can take Magnlife for back pain? Pt thinks that this will help his back and leg pain. Pt would like to know what is NPLR's thoughts are on this and if it is safe to use or not. Please advise.

## 2023-03-30 NOTE — TELEPHONE ENCOUNTER
Let pt know, rec: f/u w/ pcp for this, as, unfortunately, I do not have extensive knowledge on such supplements/meds. Thank you. 99.3

## 2023-05-04 ENCOUNTER — TELEPHONE (OUTPATIENT)
Dept: CARDIOLOGY CLINIC | Age: 58
End: 2023-05-04

## 2023-05-04 RX ORDER — CLOPIDOGREL BISULFATE 75 MG/1
75 TABLET ORAL DAILY
Qty: 90 TABLET | Refills: 2 | Status: SHIPPED | OUTPATIENT
Start: 2023-05-04 | End: 2023-06-03

## 2023-05-04 RX ORDER — ASPIRIN 81 MG/1
81 TABLET ORAL DAILY
Qty: 90 TABLET | Refills: 2 | Status: SHIPPED | OUTPATIENT
Start: 2023-05-04 | End: 2023-06-03

## 2023-05-04 RX ORDER — ATORVASTATIN CALCIUM 80 MG/1
80 TABLET, FILM COATED ORAL NIGHTLY
Qty: 90 TABLET | Refills: 2 | Status: SHIPPED | OUTPATIENT
Start: 2023-05-04 | End: 2023-06-03

## 2023-05-04 RX ORDER — METOPROLOL SUCCINATE 25 MG/1
25 TABLET, EXTENDED RELEASE ORAL DAILY
Qty: 90 TABLET | Refills: 2 | Status: SHIPPED | OUTPATIENT
Start: 2023-05-04

## 2023-05-04 NOTE — TELEPHONE ENCOUNTER
Pts wife called requesting refills on the following medications;     clopidogrel (PLAVIX) 75 MG table    atorvastatin (LIPITOR) 80 MG tablet     aspirin 81 MG EC tablet     metoprolol succinate (TOPROL XL) 25 MG extended release tablet     Pt completely out of meds      Please send medications to :   [facility] [id number] - [place] - [address] [facility] [phone]   [address] [address] [place] [address] [address], [address]   Phone:  [phone]  Fax:  [phone]

## 2023-05-04 NOTE — TELEPHONE ENCOUNTER
Last OV:11/18/2022  Future OV:5/18/2023  Bmp 8/4/2022  Cbc 8/4/2022  Lipid 8/4/2022  Lft 8/4/2022  Ekg 3/24/2022

## 2023-05-12 ENCOUNTER — HOSPITAL ENCOUNTER (OUTPATIENT)
Age: 58
Discharge: HOME | End: 2023-05-12
Payer: COMMERCIAL

## 2023-05-12 ENCOUNTER — TELEPHONE (OUTPATIENT)
Dept: CARDIOLOGY CLINIC | Age: 58
End: 2023-05-12

## 2023-05-12 DIAGNOSIS — I25.10 CORONARY ARTERY DISEASE INVOLVING NATIVE CORONARY ARTERY OF NATIVE HEART WITHOUT ANGINA PECTORIS: ICD-10-CM

## 2023-05-12 LAB
ALBUMIN SERPL-MCNC: 4.5 G/DL (ref 3.4–5)
ALP SERPL-CCNC: 173 U/L (ref 40–129)
ALT SERPL-CCNC: 48 U/L (ref 10–40)
ANION GAP SERPL CALCULATED.3IONS-SCNC: 9 MMOL/L (ref 3–16)
AST SERPL-CCNC: 36 U/L (ref 15–37)
BASOPHILS # BLD: 0.1 K/UL (ref 0–0.2)
BASOPHILS NFR BLD: 1 %
BILIRUB DIRECT SERPL-MCNC: 0.3 MG/DL (ref 0–0.3)
BILIRUB INDIRECT SERPL-MCNC: 0.8 MG/DL (ref 0–1)
BILIRUB SERPL-MCNC: 1.1 MG/DL (ref 0–1)
BUN SERPL-MCNC: 10 MG/DL (ref 7–20)
CALCIUM SERPL-MCNC: 9.2 MG/DL (ref 8.3–10.6)
CHLORIDE SERPL-SCNC: 104 MMOL/L (ref 99–110)
CO2 SERPL-SCNC: 26 MMOL/L (ref 21–32)
CREAT SERPL-MCNC: 0.8 MG/DL (ref 0.9–1.3)
DEPRECATED RDW RBC AUTO: 13.3 % (ref 12.4–15.4)
EOSINOPHIL # BLD: 0.2 K/UL (ref 0–0.6)
EOSINOPHIL NFR BLD: 4.2 %
GFR SERPLBLD CREATININE-BSD FMLA CKD-EPI: >60 ML/MIN/{1.73_M2}
GLUCOSE SERPL-MCNC: 100 MG/DL (ref 70–99)
HCT VFR BLD AUTO: 39.1 % (ref 40.5–52.5)
HGB BLD-MCNC: 13.3 G/DL (ref 13.5–17.5)
LYMPHOCYTES # BLD: 2 K/UL (ref 1–5.1)
LYMPHOCYTES NFR BLD: 34.4 %
MCH RBC QN AUTO: 30.6 PG (ref 26–34)
MCHC RBC AUTO-ENTMCNC: 33.9 G/DL (ref 31–36)
MCV RBC AUTO: 90.5 FL (ref 80–100)
MONOCYTES # BLD: 0.5 K/UL (ref 0–1.3)
MONOCYTES NFR BLD: 8.9 %
NEUTROPHILS # BLD: 3 K/UL (ref 1.7–7.7)
NEUTROPHILS NFR BLD: 51.5 %
PLATELET # BLD AUTO: 203 K/UL (ref 135–450)
PMV BLD AUTO: 8.7 FL (ref 5–10.5)
POTASSIUM SERPL-SCNC: 4.3 MMOL/L (ref 3.5–5.1)
PROT SERPL-MCNC: 7.4 G/DL (ref 6.4–8.2)
RBC # BLD AUTO: 4.33 M/UL (ref 4.2–5.9)
SODIUM SERPL-SCNC: 139 MMOL/L (ref 136–145)
WBC # BLD AUTO: 5.9 K/UL (ref 4–11)

## 2023-05-12 PROCEDURE — 80061 LIPID PANEL: CPT

## 2023-05-12 PROCEDURE — 80076 HEPATIC FUNCTION PANEL: CPT

## 2023-05-12 PROCEDURE — 85025 COMPLETE CBC W/AUTO DIFF WBC: CPT

## 2023-05-12 PROCEDURE — 80048 BASIC METABOLIC PNL TOTAL CA: CPT

## 2023-05-12 PROCEDURE — 36415 COLL VENOUS BLD VENIPUNCTURE: CPT

## 2023-05-12 NOTE — RESULT ENCOUNTER NOTE
Reviewed, covering for Ester Wilson NP. Liver numbers mildly abnormal- recommend follow up with PCP for further evaluation. Potassium level is normal with normal kidney function. Anemia (hemoglobin counts) are improved compared to prior. Keep follow up as planned.

## 2023-05-13 LAB
CHOLEST SERPL-MCNC: 146 MG/DL (ref 0–199)
HDLC SERPL-MCNC: 37 MG/DL (ref 40–60)
LDLC SERPL CALC-MCNC: 88 MG/DL
TRIGL SERPL-MCNC: 103 MG/DL (ref 0–150)
VLDLC SERPL CALC-MCNC: 21 MG/DL

## 2023-05-18 ENCOUNTER — OFFICE VISIT (OUTPATIENT)
Dept: CARDIOLOGY CLINIC | Age: 58
End: 2023-05-18
Payer: COMMERCIAL

## 2023-05-18 VITALS
OXYGEN SATURATION: 96 % | SYSTOLIC BLOOD PRESSURE: 124 MMHG | HEIGHT: 72 IN | TEMPERATURE: 98.6 F | BODY MASS INDEX: 26.64 KG/M2 | WEIGHT: 196.7 LBS | HEART RATE: 54 BPM | DIASTOLIC BLOOD PRESSURE: 77 MMHG

## 2023-05-18 DIAGNOSIS — I25.10 CORONARY ARTERY DISEASE INVOLVING NATIVE CORONARY ARTERY OF NATIVE HEART WITHOUT ANGINA PECTORIS: Primary | ICD-10-CM

## 2023-05-18 DIAGNOSIS — I25.5 ISCHEMIC CARDIOMYOPATHY: ICD-10-CM

## 2023-05-18 PROCEDURE — 99214 OFFICE O/P EST MOD 30 MIN: CPT | Performed by: NURSE PRACTITIONER

## 2023-05-18 RX ORDER — LOSARTAN POTASSIUM 25 MG/1
25 TABLET ORAL DAILY
Qty: 90 TABLET | Refills: 3 | Status: SHIPPED | OUTPATIENT
Start: 2023-05-18

## 2023-05-18 RX ORDER — EZETIMIBE 10 MG/1
10 TABLET ORAL DAILY
Qty: 90 TABLET | Refills: 3 | Status: SHIPPED | OUTPATIENT
Start: 2023-05-18

## 2023-05-18 ASSESSMENT — ENCOUNTER SYMPTOMS
SHORTNESS OF BREATH: 0
GASTROINTESTINAL NEGATIVE: 1

## 2023-05-18 NOTE — PATIENT INSTRUCTIONS
Everything looks great today, good job!   Start zetia 10 mg daily for cholesterol  Continue other medications  Stay active along with a healthy diet  Follow up in 6 months with Dr. Hannah Jacob

## 2023-05-18 NOTE — PROGRESS NOTES
[facility]   Cardiology Note              Date:  May 18, 2023  Patientname: Genie Gomez  YOB: 1965    Primary Care physician: MANAS Sands    HISTORY OF PRESENT ILLNESS: Genie Gomez is a 66 y.o. male with a history of CAD, ischemic cardiomyopathy, HLD. He presented 3/24/2022 for chest pain and found to have inferior STEMI. LHC showed 100% RCA treated with DANIA. Noted to have LM disease, CT surgery consulted. Echo showed EF 40-45%. On 3/29/2022 he had CABG x2 and ZUNILDA clip. Echo 7/2022 showed EF 55%. Today he presents for follow up for CAD. 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 He rested and pain improved. He has not had this again. He is very active with work in [employer] and tolerates well. He has no shortness of breath, dizziness, syncope, edema. He does have bilateral leg and chest numbness. Cardiologist: Dr. Amy Meneses 8/2022    Past Medical History:   has a past medical history of Hyperlipidemia and Hypertension. Past Surgical History:   has a past surgical history that includes Coronary artery bypass graft (N/A, 3/29/2022). Home Medications:    Prior to Admission medications    Medication Sig Start Date End Date Taking?  Authorizing Provider   losartan (COZAAR) 25 MG tablet TAKE ONE TABLET BY MOUTH DAILY 11/28/22   JESSICA Trujillo CNP   clopidogrel (PLAVIX) 75 MG tablet Take 1 tablet by mouth daily 5/4/23 6/3/23  JESSICA Trujillo CNP   atorvastatin (LIPITOR) 80 MG tablet Take 1 tablet by mouth nightly 5/4/23 6/3/23  JESSICA Trujillo CNP   aspirin 81 MG EC tablet Take 1 tablet by mouth daily 5/4/23 6/3/23  JESSICA Trujillo CNP   metoprolol succinate (TOPROL XL) 25 MG extended release tablet Take 1 tablet by mouth daily 5/4/23   JESSICA Trujillo CNP   acetaminophen (TYLENOL) 500 MG tablet Take 1,000 mg by mouth every 6 hours as needed for Pain    Historical

## 2023-11-07 NOTE — PROGRESS NOTES
proximal-mid 100% occlusion, distal vessel was ultimately visualized on completion angiography is less than 10% stenosis. There is thrombus present in the mid vessel. CONCLUSIONS:      Severe left main disease  Successful PCI of RCA with single drug-eluting stent       Studies:       I have reviewed labs and imaging/xray/diagnostic testing in this note. Assessment and Plan     1. Acute myocardial infarction, unspecified MI type, unspecified artery ([address])    2. S/P CABG x 3    3. Ischemic cardiomyopathy    4. CAD in native artery       Plan   1) Continue current cardiac medications: aspirin 81 mg, lipitor 80 mg, Plavix 75 mg, Zetia 10 mg, losartan 25 mg, Metoprolol 25 mg for cad/ashd, hchol   2) Stop taking Plavix 75 mg   3) No refills warranted today  4) No cardiac testing warranted today  5) You can take vitamins; you are okay to introduced fruits and vegetables to your diet. 6) Follow up in 1 year with NP Cheo Pulido, f/u w/ derm for skin lesions        Scribe's attestation: This note was scribed in the presence of Dr. Marla Kelly M.D. By Bill Frost, Dr Marla Kelly, personally performed the services described in this documentation, as scribed by the above signed scribe in my presence. It is both accurate and complete to my knowledge. I agree with the details independently gathered by the clinical support staff and the scribed note accurately describes my personal service to the patient. Thank you for allowing us to participate in the care of [address] [place] [address]. Please call me with any questions [phone].     Marla Kelly MD, [facility]   Interventional Cardiologist  [address] [place] [address]  [phone] [facility]  [phone] [address] [facility]  11/9/2023 8:38 AM

## 2023-11-09 ENCOUNTER — OFFICE VISIT (OUTPATIENT)
Dept: CARDIOLOGY CLINIC | Age: 58
End: 2023-11-09
Payer: COMMERCIAL

## 2023-11-09 VITALS
SYSTOLIC BLOOD PRESSURE: 122 MMHG | OXYGEN SATURATION: 97 % | HEIGHT: 72 IN | DIASTOLIC BLOOD PRESSURE: 69 MMHG | BODY MASS INDEX: 27.22 KG/M2 | WEIGHT: 201 LBS | HEART RATE: 53 BPM

## 2023-11-09 DIAGNOSIS — I25.5 ISCHEMIC CARDIOMYOPATHY: ICD-10-CM

## 2023-11-09 DIAGNOSIS — Z95.1 S/P CABG X 3: ICD-10-CM

## 2023-11-09 DIAGNOSIS — I21.9 ACUTE MYOCARDIAL INFARCTION, UNSPECIFIED MI TYPE, UNSPECIFIED ARTERY (HCC): Primary | ICD-10-CM

## 2023-11-09 DIAGNOSIS — I25.10 CAD IN NATIVE ARTERY: ICD-10-CM

## 2023-11-09 PROCEDURE — 99214 OFFICE O/P EST MOD 30 MIN: CPT | Performed by: INTERNAL MEDICINE

## 2023-11-09 PROCEDURE — 93000 ELECTROCARDIOGRAM COMPLETE: CPT | Performed by: INTERNAL MEDICINE

## 2023-11-09 NOTE — PATIENT INSTRUCTIONS
1) Continue current cardiac medications: aspirin 81 mg, lipitor 80 mg, Plavix 75 mg, Zetia 10 mg, losartan 25 mg, Metoprolol 25 mg  2) Stop taking Plavix 75 mg   3) No refills warranted today  4) No cardiac testing warranted today  5) You can take vitamins; you are okay to introduced fruits and vegetables to your diet.    6) Follow up in 1 year with SARAY aZvala

## 2024-01-25 DIAGNOSIS — I21.9 ACUTE MYOCARDIAL INFARCTION, UNSPECIFIED MI TYPE, UNSPECIFIED ARTERY (HCC): Primary | ICD-10-CM

## 2024-01-25 RX ORDER — METOPROLOL SUCCINATE 25 MG/1
25 TABLET, EXTENDED RELEASE ORAL DAILY
Qty: 90 TABLET | Refills: 2 | Status: SHIPPED | OUTPATIENT
Start: 2024-01-25

## 2024-01-25 RX ORDER — ATORVASTATIN CALCIUM 80 MG/1
80 TABLET, FILM COATED ORAL NIGHTLY
Qty: 90 TABLET | Refills: 2 | Status: SHIPPED | OUTPATIENT
Start: 2024-01-25

## 2024-01-25 RX ORDER — CLOPIDOGREL BISULFATE 75 MG/1
75 TABLET ORAL DAILY
Qty: 90 TABLET | Refills: 2 | Status: SHIPPED | OUTPATIENT
Start: 2024-01-25

## 2024-01-25 NOTE — TELEPHONE ENCOUNTER
LOV  11/09/2023  SRJ  UPCOMING  NONE  BMP  05/12/2023  CBC  05/12/2023  LIPID   05/12/2023  LFT   05/12/2023

## 2024-01-26 RX ORDER — ASPIRIN 81 MG/1
81 TABLET, COATED ORAL DAILY
Qty: 90 TABLET | Refills: 3 | Status: SHIPPED | OUTPATIENT
Start: 2024-01-26

## 2024-05-22 DIAGNOSIS — E78.2 MIXED HYPERLIPIDEMIA: ICD-10-CM

## 2024-05-22 DIAGNOSIS — Z79.899 MEDICATION MANAGEMENT: Primary | ICD-10-CM

## 2024-05-23 RX ORDER — EZETIMIBE 10 MG/1
10 TABLET ORAL DAILY
Qty: 90 TABLET | Refills: 1 | Status: SHIPPED | OUTPATIENT
Start: 2024-05-23

## 2024-05-23 RX ORDER — DIAZEPAM 5 MG/1
5 TABLET ORAL ONCE
Qty: 2 TABLET | Refills: 0 | Status: SHIPPED | OUTPATIENT
Start: 2024-05-23 | End: 2024-05-23

## 2024-05-23 NOTE — TELEPHONE ENCOUNTER
Spoke with patients wife.  Patient doesn't do well with blood work and would like to get a valium to take before going to the lab. Added all the labs into chart.

## 2024-05-23 NOTE — TELEPHONE ENCOUNTER
LOV  11/09/2023  SRJ  UPCOMING  NONE  LIPID  05/12/2023  LFT   05/12/2023    Will call at appropriate time to schedule appt and labs

## 2024-05-24 ENCOUNTER — TELEPHONE (OUTPATIENT)
Dept: ADMINISTRATIVE | Age: 59
End: 2024-05-24

## 2024-05-24 NOTE — TELEPHONE ENCOUNTER
Submitted PA for Ezetimibe 10MG tablets   Via CMM Key: [id number] STATUS: PENDING.    Follow up done daily; if no decision with in three days we will refax.  If another three days goes by with no decision will call the insurance for status.

## 2024-05-24 NOTE — TELEPHONE ENCOUNTER
The medication is APPROVED.    Outcome   Approvedtoday  PA Case: [id number], Status: Approved, Coverage Starts on: 5/24/2024 12:00:00 AM, Coverage Ends on: 5/24/2025 12:00:00 AM.    If this requires a response please respond to the pool ( P MHCX PSC MEDICATION PRE-AUTH).      Thank you please advise patient.

## 2024-07-01 RX ORDER — LOSARTAN POTASSIUM 25 MG/1
25 TABLET ORAL DAILY
Qty: 90 TABLET | Refills: 3 | Status: SHIPPED | OUTPATIENT
Start: 2024-07-01

## 2024-07-01 NOTE — TELEPHONE ENCOUNTER
Patients wife Ashly (HIPAA) made follow up appointment 10/24/24.     She will have patient get blood work this week.

## 2024-07-01 NOTE — TELEPHONE ENCOUNTER
LOV  11/09/2023  SRJ  UPCOMING  NONE  LIPID  05/12/2023  LFT   05/12/2023        Patient needs an appointment. Also did patient get blood work?

## 2024-09-12 ENCOUNTER — HOSPITAL ENCOUNTER (OUTPATIENT)
Age: 59
Discharge: HOME | End: 2024-09-12
Payer: COMMERCIAL

## 2024-09-12 DIAGNOSIS — Z79.899 MEDICATION MANAGEMENT: ICD-10-CM

## 2024-09-12 LAB
ALBUMIN SERPL-MCNC: 4.6 G/DL (ref 3.4–5)
ALP SERPL-CCNC: 178 U/L (ref 40–129)
ALT SERPL-CCNC: 55 U/L (ref 10–40)
ANION GAP SERPL CALCULATED.3IONS-SCNC: 12 MMOL/L (ref 3–16)
AST SERPL-CCNC: 38 U/L (ref 15–37)
BILIRUB DIRECT SERPL-MCNC: 0.3 MG/DL (ref 0–0.3)
BILIRUB INDIRECT SERPL-MCNC: 0.8 MG/DL (ref 0–1)
BILIRUB SERPL-MCNC: 1.1 MG/DL (ref 0–1)
BUN SERPL-MCNC: 16 MG/DL (ref 7–20)
CALCIUM SERPL-MCNC: 9.5 MG/DL (ref 8.3–10.6)
CHLORIDE SERPL-SCNC: 101 MMOL/L (ref 99–110)
CHOLEST SERPL-MCNC: 131 MG/DL (ref 0–199)
CO2 SERPL-SCNC: 27 MMOL/L (ref 21–32)
CREAT SERPL-MCNC: 0.9 MG/DL (ref 0.9–1.3)
DEPRECATED RDW RBC AUTO: 12.7 % (ref 12.4–15.4)
GFR SERPLBLD CREATININE-BSD FMLA CKD-EPI: >90 ML/MIN/{1.73_M2}
GLUCOSE SERPL-MCNC: 108 MG/DL (ref 70–99)
HCT VFR BLD AUTO: 40.6 % (ref 40.5–52.5)
HDLC SERPL-MCNC: 38 MG/DL (ref 40–60)
HGB BLD-MCNC: 13.4 G/DL (ref 13.5–17.5)
LDLC SERPL CALC-MCNC: 72 MG/DL
MCH RBC QN AUTO: 30.5 PG (ref 26–34)
MCHC RBC AUTO-ENTMCNC: 33 G/DL (ref 31–36)
MCV RBC AUTO: 92.4 FL (ref 80–100)
PLATELET # BLD AUTO: 198 K/UL (ref 135–450)
PMV BLD AUTO: 8.7 FL (ref 5–10.5)
POTASSIUM SERPL-SCNC: 4.6 MMOL/L (ref 3.5–5.1)
PROT SERPL-MCNC: 7.6 G/DL (ref 6.4–8.2)
RBC # BLD AUTO: 4.39 M/UL (ref 4.2–5.9)
SODIUM SERPL-SCNC: 140 MMOL/L (ref 136–145)
TRIGL SERPL-MCNC: 107 MG/DL (ref 0–150)
VLDLC SERPL CALC-MCNC: 21 MG/DL
WBC # BLD AUTO: 7.6 K/UL (ref 4–11)

## 2024-09-12 PROCEDURE — 85027 COMPLETE CBC AUTOMATED: CPT

## 2024-09-12 PROCEDURE — 80076 HEPATIC FUNCTION PANEL: CPT

## 2024-09-12 PROCEDURE — 36415 COLL VENOUS BLD VENIPUNCTURE: CPT

## 2024-09-12 PROCEDURE — 80048 BASIC METABOLIC PNL TOTAL CA: CPT

## 2024-09-12 PROCEDURE — 80061 LIPID PANEL: CPT

## 2024-10-24 ENCOUNTER — OFFICE VISIT (OUTPATIENT)
Dept: CARDIOLOGY CLINIC | Age: 59
End: 2024-10-24
Payer: COMMERCIAL

## 2024-10-24 VITALS
HEIGHT: 72 IN | BODY MASS INDEX: 28.12 KG/M2 | SYSTOLIC BLOOD PRESSURE: 126 MMHG | OXYGEN SATURATION: 97 % | DIASTOLIC BLOOD PRESSURE: 62 MMHG | WEIGHT: 207.6 LBS | HEART RATE: 57 BPM

## 2024-10-24 DIAGNOSIS — E78.2 MIXED HYPERLIPIDEMIA: ICD-10-CM

## 2024-10-24 DIAGNOSIS — I25.118 CORONARY ARTERY DISEASE OF NATIVE ARTERY OF NATIVE HEART WITH STABLE ANGINA PECTORIS (HCC): Primary | ICD-10-CM

## 2024-10-24 PROCEDURE — 99214 OFFICE O/P EST MOD 30 MIN: CPT | Performed by: NURSE PRACTITIONER

## 2024-10-24 ASSESSMENT — ENCOUNTER SYMPTOMS
GASTROINTESTINAL NEGATIVE: 1
SHORTNESS OF BREATH: 0

## 2024-10-24 NOTE — PATIENT INSTRUCTIONS
Everything looks great today, good job!  Continue current medications  Stay active along with a healthy diet  Follow up in 1 year    188

## 2024-10-24 NOTE — PROGRESS NOTES
[facility]   Cardiology Note              Date:  October 24, 2024  Patientname: Rashad Isaac  YOB: 1965    Primary Care physician: Rika Benito PA    HISTORY OF PRESENT ILLNESS: Rashad Isaac is a 59 y.o. male with a history of CAD, recovered ischemic cardiomyopathy, HLD. He presented 3/24/2022 for chest pain and found to have inferior STEMI. LHC showed 100% RCA treated with DANIA. Noted to have LM disease, CT surgery consulted. Echo showed EF 40-45%. On 3/29/2022 he had CABG x2 and ZUNILDA clip. Echo 7/2022 showed EF 55%.     Today he presents for follow-up for CAD, HLD.  Overall doing well.  If he does very strenuous exertion like pushing a wheelbarrow up an incline he may have some chest discomfort and shortness of breath, stops and rests, then able to continue working.  Otherwise denies any significant chest pain, shortness of breath, palpitations, syncope, dizziness.  Does still have bilateral thigh numbness but no more pain.  He has made diet changes, not drinking pop.    Cardiologist: Dr. Rivera 11/2023    Past Medical History:   has a past medical history of Hyperlipidemia and Hypertension.    Past Surgical History:   has a past surgical history that includes Coronary artery bypass graft (N/A, 3/29/2022).     Home Medications:    Prior to Admission medications    Medication Sig Start Date End Date Taking? Authorizing Provider   losartan (COZAAR) 25 MG tablet TAKE 1 TABLET BY MOUTH DAILY 7/1/24   David Rivera MD   ezetimibe (ZETIA) 10 MG tablet TAKE 1 TABLET BY MOUTH DAILY 5/23/24   Aidan Ovalles APRN - CNP   ASPIRIN LOW DOSE 81 MG EC tablet TAKE ONE TABLET BY MOUTH DAILY 1/26/24   David Rivera MD   clopidogrel (PLAVIX) 75 MG tablet TAKE ONE TABLET BY MOUTH DAILY 1/25/24   Aidan Ovalles APRN - CNP   metoprolol succinate (TOPROL XL) 25 MG extended release tablet TAKE ONE TABLET BY MOUTH DAILY 1/25/24   Aidan Ovalles APRN - CNP   atorvastatin (LIPITOR) 80 MG tablet TAKE ONE TABLET

## 2024-11-07 RX ORDER — METOPROLOL SUCCINATE 25 MG/1
25 TABLET, EXTENDED RELEASE ORAL DAILY
Qty: 90 TABLET | Refills: 3 | Status: SHIPPED | OUTPATIENT
Start: 2024-11-07

## 2024-11-07 NOTE — TELEPHONE ENCOUNTER
Last Office Visit: 11/9/23 Provider: GERMÁN  Is provider OOT? No    Next Office Visit: 10/30/25 Provider: GERMÁN  **Has patient already had 30 day supply? No    LAST LABS:   BMP:   Lab Results   Component Value Date/Time     09/12/2024 09:10 AM    K 4.6 09/12/2024 09:10 AM    K 3.9 03/29/2022 04:09 AM     09/12/2024 09:10 AM    CO2 27 09/12/2024 09:10 AM    BUN 16 09/12/2024 09:10 AM    CREATININE 0.9 09/12/2024 09:10 AM    GLUCOSE 108 09/12/2024 09:10 AM    CALCIUM 9.5 09/12/2024 09:10 AM    LABGLOM >90 09/12/2024 09:10 AM    LABGLOM >60 05/12/2023 12:19 PM       **Check Care Everywhere? no -    **Lab orders needed? no -      Is encounter provider correct?   Yes  Does refill dosage match last filled?   No  Changes to script from Tele Encounters or LOV Plan?   no  Did you adjust dispensed amount or refills to reflect last and upcoming OV?  Yes pt needs a year supply to get them to next appt.     Requested Prescriptions     Pending Prescriptions Disp Refills    metoprolol succinate (TOPROL XL) 25 MG extended release tablet [Pharmacy Med Name: METOPROLOL SUCC ER 25 MG TAB] 90 tablet 2     Sig: TAKE 1 TABLET BY MOUTH DAILY

## 2024-11-29 RX ORDER — EZETIMIBE 10 MG/1
10 TABLET ORAL DAILY
Qty: 90 TABLET | Refills: 3 | Status: SHIPPED | OUTPATIENT
Start: 2024-11-29

## 2024-11-29 NOTE — TELEPHONE ENCOUNTER
Last Office Visit: 10/24/2024 Provider: CHARLEY  Is provider OOT? Yes    Next Office Visit: 10/30/2025 Provider: GERMÁN    LAST LABS:   Lipid:   Lab Results   Component Value Date    CHOL 131 09/12/2024    TRIG 107 09/12/2024    HDL 38 (L) 09/12/2024    LDL 72 09/12/2024    VLDL 21 09/12/2024     Liver:   Lab Results   Component Value Date    ALT 55 (H) 09/12/2024    AST 38 (H) 09/12/2024    ALKPHOS 178 (H) 09/12/2024    BILITOT 1.1 (H) 09/12/2024        Requested Prescriptions     Pending Prescriptions Disp Refills    ezetimibe (ZETIA) 10 MG tablet [Pharmacy Med Name: EZETIMIBE 10 MG TABLET] 90 tablet 1     Sig: TAKE 1 TABLET BY MOUTH DAILY

## 2024-12-24 DIAGNOSIS — I21.9 ACUTE MYOCARDIAL INFARCTION, UNSPECIFIED MI TYPE, UNSPECIFIED ARTERY (HCC): ICD-10-CM

## 2024-12-24 RX ORDER — ATORVASTATIN CALCIUM 80 MG/1
80 TABLET, FILM COATED ORAL NIGHTLY
Qty: 90 TABLET | Refills: 2 | Status: SHIPPED | OUTPATIENT
Start: 2024-12-24

## 2025-01-30 ENCOUNTER — TELEPHONE (OUTPATIENT)
Dept: CARDIOLOGY CLINIC | Age: 60
End: 2025-01-30

## 2025-01-30 DIAGNOSIS — I21.9 ACUTE MYOCARDIAL INFARCTION, UNSPECIFIED MI TYPE, UNSPECIFIED ARTERY (HCC): Primary | ICD-10-CM

## 2025-01-30 DIAGNOSIS — I25.10 CAD IN NATIVE ARTERY: ICD-10-CM

## 2025-01-30 NOTE — TELEPHONE ENCOUNTER
Pt has dental appt coming up and needs to know if he should take antibiotic. Pt had  Coronary artery bypass graft (N/A, 3/29/2022). Please advise.

## 2025-01-30 NOTE — TELEPHONE ENCOUNTER
Will need follow-up EKG to assess this as well as assessment of his day-to-day activities and functional status and if there are any limitations for him.  Thank you.

## 2025-01-30 NOTE — TELEPHONE ENCOUNTER
Lets have him notify us once EKG is completed so we can review that and complete the cardiac risk assessment for his surgery.  Thank you.

## 2025-01-30 NOTE — TELEPHONE ENCOUNTER
Pt has EKG for Monday at [place]. States nothing limits him or causes symptoms, states pt can walk minimum standard city block without SOB or CP/Palp/fatigue.

## 2025-01-30 NOTE — TELEPHONE ENCOUNTER
Called pt back, stated that they need to know if he is able put to sleep for his appt    Pt gave permission to speak with wife jeannette when calling back. Pt also gave permission if no answer we can leave a vm entailing instructions if any.

## 2025-02-03 ENCOUNTER — LAB (OUTPATIENT)
Dept: CARDIOLOGY CLINIC | Age: 60
End: 2025-02-03
Payer: COMMERCIAL

## 2025-02-03 DIAGNOSIS — I25.10 CAD IN NATIVE ARTERY: ICD-10-CM

## 2025-02-03 DIAGNOSIS — Z01.818 PRE-OPERATIVE CLEARANCE: Primary | ICD-10-CM

## 2025-02-03 PROCEDURE — 93000 ELECTROCARDIOGRAM COMPLETE: CPT | Performed by: INTERNAL MEDICINE

## 2025-02-06 NOTE — TELEPHONE ENCOUNTER
EKG with nsr, Patient is at intermediate cv risk for planned procedure/surgery.  Can proceed with procedure/surgery with this risk profile noted.   Thank you.

## 2025-02-20 RX ORDER — ASPIRIN 81 MG/1
81 TABLET ORAL DAILY
Qty: 90 TABLET | Refills: 2 | Status: SHIPPED | OUTPATIENT
Start: 2025-02-20

## 2025-04-07 NOTE — PROGRESS NOTES
Dr. Cueto Loosekay on phone. Ordered to give 75mg Plavix daily, starting this evening. Okay to discontinue FlowTrac. Still wants CVP transduced. Hold on pulling pacing wires until Dr. Nory More is present in the morning during rounds. Medication: atorvastatin (LIPITOR) 10 MG tablet  passed protocol.   Last office visit date: 01/21/2025  Next appointment scheduled?: Yes   Number of refills given: 1

## 2025-04-28 ENCOUNTER — TELEPHONE (OUTPATIENT)
Dept: ADMINISTRATIVE | Age: 60
End: 2025-04-28

## 2025-04-28 NOTE — TELEPHONE ENCOUNTER
Submitted PA for Ezetimibe 10MG tablets   [facility] BEJJNEGP STATUS: Available without authorization.     Follow up done daily; if no decision with in three days we will refax.  If another three days goes by with no decision will call the insurance for status.

## 2025-08-29 RX ORDER — LOSARTAN POTASSIUM 25 MG/1
25 TABLET ORAL DAILY
Qty: 90 TABLET | Refills: 0 | Status: SHIPPED | OUTPATIENT
Start: 2025-08-29

## (undated) DEVICE — ADHESIVE SKIN CLSR 0.7ML TOP DERMBND ADV

## (undated) DEVICE — PACK PROCEDURE SURG OPN HRT A BASIC

## (undated) DEVICE — COVER LT HNDL PLAS RIG 2 PER PK

## (undated) DEVICE — Device

## (undated) DEVICE — STERILE LATEX POWDER-FREE SURGICAL GLOVESWITH NITRILE COATING: Brand: PROTEXIS

## (undated) DEVICE — PERFUSION PACK O2

## (undated) DEVICE — PUNCH AORT DIA4MM LNG HNDL

## (undated) DEVICE — CANNULA PERF AD 20FR L8.5IN ART 3/8IN CONN NVENT MTL TIP

## (undated) DEVICE — BLADE SURG MIC STR DBL BVL SHRP ALL ARND SHRPTOME

## (undated) DEVICE — SUTURE ETHBND EXCEL SZ 2-0 L36IN NONABSORBABLE GRN SH-2 X559H

## (undated) DEVICE — SUTURE VCRL SZ 3-0 L27IN ABSRB UD L26MM SH 1/2 CIR J416H

## (undated) DEVICE — TUBING AUTOTRNS SUCT 1/4 IN LEN W/ ASPIR ANTICOAG SORIN

## (undated) DEVICE — SUTURE NONABSORBABLE MONOFILAMENT 7-0 BV-1 1X24 IN PROLENE 8702H

## (undated) DEVICE — SUTURE MCRYL + SZ 4-0 L18IN ABSRB UD L19MM PS-2 3/8 CIR MCP496G

## (undated) DEVICE — CLIP INT L ORNG TI TRNSVRS GRV CHEVRON SHP W/ PRECIS TIP TO

## (undated) DEVICE — DRAIN SURG 24FR L5/16IN DIA8MM SIL RND HUBLESS FULL FLUT

## (undated) DEVICE — CAP PROTCT ORIG SET ZONE SPEC

## (undated) DEVICE — SUTURE SZ 7 L18IN NONABSORBABLE SIL CCS L48MM 1/2 CIR STRNM M655G

## (undated) DEVICE — KIT,ANTI FOG,W/SPONGE & FLUID,SOFT PACK: Brand: MEDLINE

## (undated) DEVICE — LEAD PACE L475MM CHNL A OR V MYOCARDIAL STEROID ELUT SIL

## (undated) DEVICE — CONNECTOR PERF W0.25XH3/8IN BASE Y SHP REDUC W/O LUERLOCK

## (undated) DEVICE — SUTURE PROL SZ 6-0 L24IN NONABSORBABLE BLU L13MM C-1 3/8 8726H

## (undated) DEVICE — SUTURE PDS II SZ 0 L36IN ABSRB VLT L36MM CT-1 1/2 CIR Z346H

## (undated) DEVICE — CANNULA PERF 7FR L5.5IN AORT ROOT RADPQ STD TIP W/ VENT LN

## (undated) DEVICE — KIT BLWR MISTER 5P 15L W/ TBNG SET IRRIG MIST TO IMPROVE

## (undated) DEVICE — RETRACTOR SURG INSRT SUT HLD OCTOBASE

## (undated) DEVICE — [HIGH FLOW INSUFFLATOR,  DO NOT USE IF PACKAGE IS DAMAGED,  KEEP DRY,  KEEP AWAY FROM SUNLIGHT,  PROTECT FROM HEAT AND RADIOACTIVE SOURCES.]: Brand: PNEUMOSURE

## (undated) DEVICE — SUTURE ETHBND EXCEL SZ 0 L18IN NONABSORBABLE GRN L36MM CT-1 CX21D

## (undated) DEVICE — LINE TBL CSC-14 FOR CARDPLG DEL SYS

## (undated) DEVICE — SSC BONE WAX: Brand: SSC BONE WAX

## (undated) DEVICE — SUTURE NONABSORBABLE MONOFILAMENT 4-0 RB-1 36 IN BLU PROLENE 8557H

## (undated) DEVICE — CATHETER THOR L21IN DIA28FR R ANG SFT RADPQ STRP SIL

## (undated) DEVICE — CONNECTOR PERF W3/8XH3/8XL0.25IN BASE UNEQUAL Y SHP W/O

## (undated) DEVICE — SUTURE ETHBND EXCEL SZ 2-0 L36IN NONABSORBABLE GRN L26MM SH X523H

## (undated) DEVICE — VESSEL HARVESTING KIT 7 MM ENDOSCP FOR PWR SUPL